# Patient Record
Sex: FEMALE | Race: WHITE | Employment: FULL TIME | URBAN - METROPOLITAN AREA
[De-identification: names, ages, dates, MRNs, and addresses within clinical notes are randomized per-mention and may not be internally consistent; named-entity substitution may affect disease eponyms.]

---

## 2017-11-15 ENCOUNTER — HOSPITAL ENCOUNTER (OUTPATIENT)
Dept: MAMMOGRAPHY | Age: 47
Discharge: HOME | End: 2017-11-15
Attending: OBSTETRICS & GYNECOLOGY
Payer: COMMERCIAL

## 2017-11-15 DIAGNOSIS — Z12.39 BREAST CANCER SCREENING: ICD-10-CM

## 2017-11-15 PROCEDURE — 77067 SCR MAMMO BI INCL CAD: CPT

## 2018-08-24 ENCOUNTER — APPOINTMENT (OUTPATIENT)
Dept: CT IMAGING | Age: 48
DRG: 059 | End: 2018-08-24
Attending: EMERGENCY MEDICINE
Payer: COMMERCIAL

## 2018-08-24 ENCOUNTER — HOSPITAL ENCOUNTER (INPATIENT)
Age: 48
LOS: 3 days | Discharge: ANOTHER HOSPITAL | DRG: 059 | End: 2018-08-27
Attending: EMERGENCY MEDICINE | Admitting: FAMILY MEDICINE
Payer: COMMERCIAL

## 2018-08-24 DIAGNOSIS — G35 MULTIPLE SCLEROSIS EXACERBATION (HCC): Primary | ICD-10-CM

## 2018-08-24 DIAGNOSIS — R56.9 SEIZURE (HCC): ICD-10-CM

## 2018-08-24 LAB
ALBUMIN SERPL-MCNC: 3.8 G/DL (ref 3.5–5)
ALBUMIN/GLOB SERPL: 1 {RATIO} (ref 1.2–3.5)
ALP SERPL-CCNC: 57 U/L (ref 50–136)
ALT SERPL-CCNC: 23 U/L (ref 12–65)
ANION GAP SERPL CALC-SCNC: 10 MMOL/L (ref 7–16)
AST SERPL-CCNC: 29 U/L (ref 15–37)
BASOPHILS # BLD: 0 K/UL (ref 0–0.2)
BASOPHILS NFR BLD: 1 % (ref 0–2)
BILIRUB SERPL-MCNC: 1.4 MG/DL (ref 0.2–1.1)
BUN SERPL-MCNC: 12 MG/DL (ref 6–23)
CALCIUM SERPL-MCNC: 8.6 MG/DL (ref 8.3–10.4)
CHLORIDE SERPL-SCNC: 107 MMOL/L (ref 98–107)
CO2 SERPL-SCNC: 26 MMOL/L (ref 21–32)
CREAT SERPL-MCNC: 0.59 MG/DL (ref 0.6–1)
DIFFERENTIAL METHOD BLD: NORMAL
EOSINOPHIL # BLD: 0.1 K/UL (ref 0–0.8)
EOSINOPHIL NFR BLD: 1 % (ref 0.5–7.8)
ERYTHROCYTE [DISTWIDTH] IN BLOOD BY AUTOMATED COUNT: 12.5 %
GLOBULIN SER CALC-MCNC: 4 G/DL (ref 2.3–3.5)
GLUCOSE SERPL-MCNC: 97 MG/DL (ref 65–100)
HCT VFR BLD AUTO: 41.9 % (ref 35.8–46.3)
HGB BLD-MCNC: 13.8 G/DL (ref 11.7–15.4)
IMM GRANULOCYTES # BLD: 0 K/UL (ref 0–0.5)
IMM GRANULOCYTES NFR BLD AUTO: 0 % (ref 0–5)
LYMPHOCYTES # BLD: 2.1 K/UL (ref 0.5–4.6)
LYMPHOCYTES NFR BLD: 28 % (ref 13–44)
MAGNESIUM SERPL-MCNC: 2 MG/DL (ref 1.8–2.4)
MCH RBC QN AUTO: 29.9 PG (ref 26.1–32.9)
MCHC RBC AUTO-ENTMCNC: 32.9 G/DL (ref 31.4–35)
MCV RBC AUTO: 90.9 FL (ref 79.6–97.8)
MONOCYTES # BLD: 0.5 K/UL (ref 0.1–1.3)
MONOCYTES NFR BLD: 7 % (ref 4–12)
NEUTS SEG # BLD: 4.7 K/UL (ref 1.7–8.2)
NEUTS SEG NFR BLD: 63 % (ref 43–78)
NRBC # BLD: 0 K/UL (ref 0–0.2)
PHOSPHATE SERPL-MCNC: 3.3 MG/DL (ref 2.5–4.5)
PLATELET # BLD AUTO: 343 K/UL (ref 150–450)
PMV BLD AUTO: 9.7 FL (ref 9.4–12.3)
POTASSIUM SERPL-SCNC: 5 MMOL/L (ref 3.5–5.1)
PROT SERPL-MCNC: 7.8 G/DL (ref 6.3–8.2)
RBC # BLD AUTO: 4.61 M/UL (ref 4.05–5.2)
SODIUM SERPL-SCNC: 143 MMOL/L (ref 136–145)
WBC # BLD AUTO: 7.4 K/UL (ref 4.3–11.1)

## 2018-08-24 PROCEDURE — 74011000258 HC RX REV CODE- 258: Performed by: EMERGENCY MEDICINE

## 2018-08-24 PROCEDURE — 74011250637 HC RX REV CODE- 250/637: Performed by: FAMILY MEDICINE

## 2018-08-24 PROCEDURE — 83735 ASSAY OF MAGNESIUM: CPT

## 2018-08-24 PROCEDURE — 80053 COMPREHEN METABOLIC PANEL: CPT

## 2018-08-24 PROCEDURE — 65270000029 HC RM PRIVATE

## 2018-08-24 PROCEDURE — 84100 ASSAY OF PHOSPHORUS: CPT

## 2018-08-24 PROCEDURE — 96365 THER/PROPH/DIAG IV INF INIT: CPT | Performed by: EMERGENCY MEDICINE

## 2018-08-24 PROCEDURE — 74011250636 HC RX REV CODE- 250/636: Performed by: EMERGENCY MEDICINE

## 2018-08-24 PROCEDURE — 70450 CT HEAD/BRAIN W/O DYE: CPT

## 2018-08-24 PROCEDURE — 99284 EMERGENCY DEPT VISIT MOD MDM: CPT | Performed by: EMERGENCY MEDICINE

## 2018-08-24 PROCEDURE — 85025 COMPLETE CBC W/AUTO DIFF WBC: CPT

## 2018-08-24 RX ORDER — ONDANSETRON 2 MG/ML
4 INJECTION INTRAMUSCULAR; INTRAVENOUS
Status: DISCONTINUED | OUTPATIENT
Start: 2018-08-24 | End: 2018-08-27 | Stop reason: HOSPADM

## 2018-08-24 RX ORDER — ACETAMINOPHEN 325 MG/1
650 TABLET ORAL
Status: DISCONTINUED | OUTPATIENT
Start: 2018-08-24 | End: 2018-08-27 | Stop reason: HOSPADM

## 2018-08-24 RX ORDER — HYDROCODONE BITARTRATE AND ACETAMINOPHEN 5; 325 MG/1; MG/1
1 TABLET ORAL
Status: DISCONTINUED | OUTPATIENT
Start: 2018-08-24 | End: 2018-08-27 | Stop reason: HOSPADM

## 2018-08-24 RX ORDER — SODIUM CHLORIDE 0.9 % (FLUSH) 0.9 %
5-10 SYRINGE (ML) INJECTION EVERY 8 HOURS
Status: DISCONTINUED | OUTPATIENT
Start: 2018-08-24 | End: 2018-08-27 | Stop reason: HOSPADM

## 2018-08-24 RX ORDER — SODIUM CHLORIDE 0.9 % (FLUSH) 0.9 %
5-10 SYRINGE (ML) INJECTION AS NEEDED
Status: DISCONTINUED | OUTPATIENT
Start: 2018-08-24 | End: 2018-08-27 | Stop reason: HOSPADM

## 2018-08-24 RX ADMIN — HYDROCODONE BITARTRATE AND ACETAMINOPHEN 1 TABLET: 5; 325 TABLET ORAL at 23:13

## 2018-08-24 RX ADMIN — Medication 5 ML: at 18:17

## 2018-08-24 RX ADMIN — SODIUM CHLORIDE 1000 MG: 9 INJECTION, SOLUTION INTRAVENOUS at 15:49

## 2018-08-24 RX ADMIN — Medication 10 ML: at 21:28

## 2018-08-24 RX ADMIN — ACETAMINOPHEN 650 MG: 325 TABLET, FILM COATED ORAL at 21:28

## 2018-08-24 NOTE — PROGRESS NOTES
TRANSFER - IN REPORT:    Verbal report received from [facility] on [employer]  being received from ED for routine progression of care      Report consisted of patients Situation, Background, Assessment and   Recommendations(SBAR). Information from the following report(s) SBAR, Kardex, ED Summary, [facility] and Recent Results was reviewed with the receiving nurse. Opportunity for questions and clarification was provided. Assessment to be completed upon patients arrival to unit and care assumed at that time.

## 2018-08-24 NOTE — PROGRESS NOTES
Visit with patient in ER prior to transfer to floor. Patient is calm. Assured patient of our support during their time with us.     Vincent Liz,  Staff   C: [phone]  /  [email or web address]

## 2018-08-24 NOTE — H&P
HOSPITALIST H&P/CONSULT  NAME:  Naina Hankins   Age:  41 y.o.  :   1970   MRN:   [id number]  PCP: PROVIDER Simran Park  Consulting MD:  Treatment Team: Attending Provider: Azra Rutherford MD; Primary Nurse: Christine Brooks RN  HPI:     Pt is a 40 yo female with known MS, last flare , no current neurologist due to [employer] retiring. She reports that her right eye vision became blurry about 1.5 weeks ago, she was seen by her eye doc with a dilated eye exam and her opthomalogist felt this was likely due to her optic nerve. A few days later she began having \"pins and needles\" sensation to her right side, starting in her face and now all the way down to her toes. Over the past 48 hours she has developed right sided weakness, now hardly able to lift extremities. She reports some urinary incontinence x 1 month. No bowel incontinence. Pt was given 1 gm Solumedrol in ER. Noted pt is not on any MS meds but has been doing \"diet control\". Vital signs stable. Pt will be admitted for IV steroids, inpatient status. Complete ROS done and is as stated in HPI or otherwise negative  Past Medical History:   Diagnosis Date    Autoimmune disease ([facility] [address] [address]     multiple sclerosis    MS (multiple sclerosis) ([facility] [address] 2012    Other ill-defined conditions(799.89)     ms      Past Surgical History:   Procedure Laterality Date    HX GYN      ruptured ovarian cyst    HX ORTHOPAEDIC      right knee    HX OTHER SURGICAL      laperoscopy    ND COLONOSCOPY FLX DX W/COLLJ SPEC WHEN PFRMD  2010           Prior to Admission Medications   Prescriptions Last Dose Informant Patient Reported? Taking? COPAXONE SC   Yes No   Sig: by SubCUTAneous route. Diphenhydramine-Acetaminophen (PERCOGESIC) 12.5-325 mg tab   Yes No   Sig: Take  by mouth. INTRAUTERINE DEVICE, IUD, IU   Yes No   Sig: by IntraUTERine route.       Facility-Administered Medications: None     Allergies   Allergen Reactions    Meperidine Rash and Nausea and Vomiting    Vioxx [Rofecoxib] Hives      Social History   Substance Use Topics    Smoking status: Never Smoker    Smokeless tobacco: Not on file    Alcohol use No      Family History   Problem Relation Age of Onset    Hypertension Mother     Heart Disease Father     Cancer Paternal Aunt     Cancer Maternal Grandfather     Cancer Paternal Grandmother     Cancer Paternal Grandfather       Objective:     Visit Vitals    /68 (BP 1 Location: Left arm, BP Patient Position: At rest)    Pulse 98    Temp 98.4 °F (36.9 °C)    Resp 20    Ht 5' 4\" (1.626 m)    Wt 62.6 kg (138 lb)    SpO2 96%    BMI 23.69 kg/m2      Temp (24hrs), Av.3 °F (36.8 °C), Min:98.2 °F (36.8 °C), Max:98.4 °F (36.9 °C)    Oxygen Therapy  O2 Sat (%): 96 % (18 1720)  Pulse via Oximetry: 105 beats per minute (18 1720)  O2 Device: Room air (18 1720)    Physical Exam:  General:    Alert, cooperative, no distress, appears stated age. Head:   Normocephalic, without obvious abnormality, atraumatic. Nose:  Nares normal. No drainage or sinus tenderness. Lungs:   Clear to auscultation bilaterally. No Wheezing or Rhonchi. No rales. Heart:   Regular rate and rhythm,  no murmur, rub or gallop. Abdomen:   Soft, non-tender. Not distended. Bowel sounds normal.   Extremities: No cyanosis. No edema. No clubbing  Skin:     Texture, turgor normal. No rashes or lesions.   Not Jaundiced  Neurologic: Alert and oriented x 3, right side dulled sensation and weakness to right extremities, right eye vision blurry     Data Review:   Recent Results (from the past 24 hour(s))   CBC WITH AUTOMATED DIFF    Collection Time: 18  1:44 PM   Result Value Ref Range    WBC 7.4 4.3 - 11.1 K/uL    RBC 4.61 4.05 - 5.2 M/uL    HGB 13.8 11.7 - 15.4 g/dL    HCT 41.9 35.8 - 46.3 %    MCV 90.9 79.6 - 97.8 FL    MCH 29.9 26.1 - 32.9 PG    MCHC 32.9 31.4 - 35.0 g/dL    RDW 12.5 %    PLATELET 576 786 - 848 K/uL    MPV 9.7 9.4 - 12.3 FL    ABSOLUTE NRBC 0.00 0.0 - 0.2 K/uL    DF AUTOMATED      NEUTROPHILS 63 43 - 78 %    LYMPHOCYTES 28 13 - 44 %    MONOCYTES 7 4.0 - 12.0 %    EOSINOPHILS 1 0.5 - 7.8 %    BASOPHILS 1 0.0 - 2.0 %    IMMATURE GRANULOCYTES 0 0.0 - 5.0 %    ABS. NEUTROPHILS 4.7 1.7 - 8.2 K/UL    ABS. LYMPHOCYTES 2.1 0.5 - 4.6 K/UL    ABS. MONOCYTES 0.5 0.1 - 1.3 K/UL    ABS. EOSINOPHILS 0.1 0.0 - 0.8 K/UL    ABS. BASOPHILS 0.0 0.0 - 0.2 K/UL    ABS. IMM. GRANS. 0.0 0.0 - 0.5 K/UL   METABOLIC PANEL, COMPREHENSIVE    Collection Time: 08/24/18  1:46 PM   Result Value Ref Range    Sodium 143 136 - 145 mmol/L    Potassium 5.0 3.5 - 5.1 mmol/L    Chloride 107 98 - 107 mmol/L    CO2 26 21 - 32 mmol/L    Anion gap 10 7 - 16 mmol/L    Glucose 97 65 - 100 mg/dL    BUN 12 6 - 23 MG/DL    Creatinine 0.59 (L) 0.6 - 1.0 MG/DL    GFR est AA >60 >60 ml/min/1.73m2    GFR est non-AA >60 >60 ml/min/1.73m2    Calcium 8.6 8.3 - 10.4 MG/DL    Bilirubin, total 1.4 (H) 0.2 - 1.1 MG/DL    ALT (SGPT) 23 12 - 65 U/L    AST (SGOT) 29 15 - 37 U/L    Alk. phosphatase 57 50 - 136 U/L    Protein, total 7.8 6.3 - 8.2 g/dL    Albumin 3.8 3.5 - 5.0 g/dL    Globulin 4.0 (H) 2.3 - 3.5 g/dL    A-G Ratio 1.0 (L) 1.2 - 3.5     PHOSPHORUS    Collection Time: 08/24/18  1:46 PM   Result Value Ref Range    Phosphorus 3.3 2.5 - 4.5 MG/DL   MAGNESIUM    Collection Time: 08/24/18  1:46 PM   Result Value Ref Range    Magnesium 2.0 1.8 - 2.4 mg/dL     Imaging /Procedures /Studies     CT Head Impression: No acute intracranial abnormality. Assessment and Plan: Active Hospital Problems    Diagnosis Date Noted    Multiple sclerosis exacerbation ([facility] [address] 07/17/2014    MS (multiple sclerosis) ([facility] 12/05/2012       A/P:    MS exacerbation- 1 gram Solumedrol given in ER, cont every 24 hours. Neuro consult. Neuro checks q shift by nursing. Pt needs to be established with outpatient neurologist, her previous has retired. Also not on maintenance meds.   Defer to neuro if MRI necessary to assess for new lesions. DC planning- [facility] in 2-3 days.   No obvious SW needs    DVT Prophylaxis:  SCDs  Code Status: Full  Anticipated discharge: 48-72 hours     Signed By: Duglas Cunningham NP     August 24, 2018

## 2018-08-24 NOTE — ED NOTES
Patient reports itching, L > R side of body. Skin exposed with no hives noted. Patient with clear = LS bilat with no discernable wheeze. MD Dewey advised.

## 2018-08-24 NOTE — ED TRIAGE NOTES
Catie Hearn MD in triage to evaluate. Pt states onset of slurred speech, right leg numbness, difficulty walking, facial numbness. Symptoms started x1-2 weeks ago. Sent by . Difficulty gripping with right hand, blurred vision. Speech problems started this morning. Hx of MS, has not been on medication in 11 years, last flare in 2014 and hospitalized at [address] [place] [address].

## 2018-08-24 NOTE — ED NOTES
Full report from [facility]. Care assumed by this RN at this time. NIH completed by prior nurse. Dysphagia screening performed with no issues. At current, patient speaking clearly and in complete sentences. Vital signs stable. Awaiting hospitalist for admission.

## 2018-08-24 NOTE — ED NOTES
TRANSFER - OUT REPORT:    Verbal report given to Peter (name) on Noah Sink  being transferred to  [phone] for routine progression of care       Report consisted of patients Situation, Background, Assessment and   Recommendations(SBAR). Information from the following report(s) SBAR, Kardex, ED Summary, Procedure Summary, Intake/Output, MAR and Recent Results was reviewed with the receiving nurse. Lines:   Peripheral IV 08/24/18 Left Antecubital (Active)   Site Assessment Clean, dry, & intact 8/24/2018  1:55 PM   Phlebitis Assessment 0 8/24/2018  1:55 PM   Infiltration Assessment 0 8/24/2018  1:55 PM   Dressing Status Clean, dry, & intact 8/24/2018  1:55 PM        Opportunity for questions and clarification was provided.       Patient transported with:   [place]

## 2018-08-24 NOTE — ED PROVIDER NOTES
HPI Comments: 32-year-old lady presented to an [facility] with a right arm and leg weakness and some speech problems. She has a history of multiple sclerosis but has not had any specific treatment for it since 2007. She said her last flareup although it was about 4 years ago. Her neurologist has since retired and so she does not have a neurologist following her anymore. Patient says her symptoms have progressed over the last couple of days. She denies any specific headache but does say that she does feel weak. No nausea or vomiting. Patient says that when she was on the Copaxone it gave her some fatigue and side effects that she could not tolerate. Elements of this note were created using speech recognition software. As such, errors of speech recognition may be present. The history is provided by the patient. Past Medical History:   Diagnosis Date    Autoimmune disease ([facility] [address]     multiple sclerosis    [facility] sclerosis) [facility] 12/5/2012    Other ill-defined conditions     ms       Past Surgical History:   Procedure Laterality Date    COLONOSCOPY,DIAGNOSTIC  6/9/2010         HX GYN      ruptured ovarian cyst    HX ORTHOPAEDIC      right knee    HX OTHER SURGICAL      laperoscopy         Family History:   Problem Relation Age of Onset    Hypertension Mother     Heart Disease Father     Cancer Paternal Aunt     Cancer Maternal Grandfather     Cancer Paternal Grandmother     Cancer Paternal Grandfather        Social History     Social History    Marital status:      Spouse name: N/A    Number of children: N/A    Years of education: N/A     Occupational History    Not on file.      Social History Main Topics    Smoking status: Never Smoker    Smokeless tobacco: Not on file    Alcohol use No    Drug use: No    Sexual activity: Not on file     Other Topics Concern    Not on file     Social History Narrative    No narrative on file         ALLERGIES: Meperidine and Vioxx [rofecoxib]    Review of Systems   Constitutional: Negative for chills, diaphoresis and fever. HENT: Negative for congestion, rhinorrhea and sore throat. Eyes: Negative for redness and visual disturbance. Respiratory: Negative for cough, chest tightness, shortness of breath and wheezing. Cardiovascular: Negative for chest pain and palpitations. Gastrointestinal: Negative for abdominal pain, blood in stool, diarrhea, nausea and vomiting. Endocrine: Negative for polydipsia and polyuria. Genitourinary: Negative for dysuria and hematuria. Musculoskeletal: Negative for arthralgias, myalgias and neck stiffness. Skin: Negative for rash. Allergic/Immunologic: Negative for environmental allergies and food allergies. Neurological: Positive for facial asymmetry, speech difficulty, weakness and numbness. Negative for dizziness and headaches. Hematological: Negative for adenopathy. Does not bruise/bleed easily. Psychiatric/Behavioral: Negative for confusion and sleep disturbance. The patient is not nervous/anxious. There were no vitals filed for this visit. Physical Exam   Constitutional: She is oriented to person, place, and time. She appears well-developed and well-nourished. HENT:   Head: Normocephalic and atraumatic. Eyes: Conjunctivae and EOM are normal. Pupils are equal, round, and reactive to light. Neck: Normal range of motion. Cardiovascular: Normal rate and regular rhythm. Pulmonary/Chest: Effort normal and breath sounds normal. No respiratory distress. She has no wheezes. She has no rales. She exhibits no tenderness. Abdominal: Soft. Bowel sounds are normal. There is no rebound and no guarding. Musculoskeletal: Normal range of motion. She exhibits no edema or tenderness. Lymphadenopathy:     She has no cervical adenopathy. Neurological: She is alert and oriented to person, place, and time. Patient has right arm and right leg weakness.   She also has some right facial droop as well as some speech problems. Skin: Skin is warm and dry. Psychiatric: She has a normal mood and affect. Nursing note and vitals reviewed. MDM  Number of Diagnoses or Management Options  Diagnosis management comments: I discussed the case with Dr. Deysi Cano who advised admission for steroids pending the results of her CT scan.    3:01 PM  I discussed the case with the hospitalist who kindly agreed to see the patient.         ED Course       Procedures

## 2018-08-25 ENCOUNTER — APPOINTMENT (OUTPATIENT)
Dept: MRI IMAGING | Age: 48
DRG: 059 | End: 2018-08-25
Attending: NURSE PRACTITIONER
Payer: COMMERCIAL

## 2018-08-25 LAB
ANION GAP SERPL CALC-SCNC: 10 MMOL/L (ref 7–16)
APPEARANCE UR: CLEAR
BACTERIA URNS QL MICRO: ABNORMAL /HPF
BILIRUB UR QL: NEGATIVE
BUN SERPL-MCNC: 11 MG/DL (ref 6–23)
CALCIUM SERPL-MCNC: 8.7 MG/DL (ref 8.3–10.4)
CASTS URNS QL MICRO: ABNORMAL /LPF
CHLORIDE SERPL-SCNC: 107 MMOL/L (ref 98–107)
CO2 SERPL-SCNC: 23 MMOL/L (ref 21–32)
COLOR UR: YELLOW
CREAT SERPL-MCNC: 0.56 MG/DL (ref 0.6–1)
EPI CELLS #/AREA URNS HPF: ABNORMAL /HPF
ERYTHROCYTE [DISTWIDTH] IN BLOOD BY AUTOMATED COUNT: 12.2 %
ERYTHROCYTE [SEDIMENTATION RATE] IN BLOOD: 8 MM/HR (ref 0–20)
GLUCOSE BLD STRIP.AUTO-MCNC: 148 MG/DL (ref 65–100)
GLUCOSE BLD STRIP.AUTO-MCNC: 272 MG/DL (ref 65–100)
GLUCOSE BLD STRIP.AUTO-MCNC: 95 MG/DL (ref 65–100)
GLUCOSE SERPL-MCNC: 214 MG/DL (ref 65–100)
GLUCOSE UR STRIP.AUTO-MCNC: NEGATIVE MG/DL
HCT VFR BLD AUTO: 39.1 % (ref 35.8–46.3)
HGB BLD-MCNC: 12.9 G/DL (ref 11.7–15.4)
HGB UR QL STRIP: ABNORMAL
KETONES UR QL STRIP.AUTO: NEGATIVE MG/DL
LEUKOCYTE ESTERASE UR QL STRIP.AUTO: ABNORMAL
MCH RBC QN AUTO: 29.8 PG (ref 26.1–32.9)
MCHC RBC AUTO-ENTMCNC: 33 G/DL (ref 31.4–35)
MCV RBC AUTO: 90.3 FL (ref 79.6–97.8)
NITRITE UR QL STRIP.AUTO: NEGATIVE
NRBC # BLD: 0 K/UL (ref 0–0.2)
PH UR STRIP: 6 [PH] (ref 5–9)
PLATELET # BLD AUTO: 326 K/UL (ref 150–450)
PMV BLD AUTO: 9.9 FL (ref 9.4–12.3)
POTASSIUM SERPL-SCNC: 3.8 MMOL/L (ref 3.5–5.1)
PROT UR STRIP-MCNC: ABNORMAL MG/DL
RBC # BLD AUTO: 4.33 M/UL (ref 4.05–5.2)
RBC #/AREA URNS HPF: ABNORMAL /HPF
SODIUM SERPL-SCNC: 140 MMOL/L (ref 136–145)
SP GR UR REFRACTOMETRY: 1.03 (ref 1–1.02)
TSH SERPL DL<=0.005 MIU/L-ACNC: 0.34 UIU/ML (ref 0.36–3.74)
UROBILINOGEN UR QL STRIP.AUTO: 0.2 EU/DL (ref 0.2–1)
WBC # BLD AUTO: 9.7 K/UL (ref 4.3–11.1)
WBC URNS QL MICRO: ABNORMAL /HPF

## 2018-08-25 PROCEDURE — 72156 MRI NECK SPINE W/O & W/DYE: CPT

## 2018-08-25 PROCEDURE — 87086 URINE CULTURE/COLONY COUNT: CPT

## 2018-08-25 PROCEDURE — 86038 ANTINUCLEAR ANTIBODIES: CPT

## 2018-08-25 PROCEDURE — 74011250636 HC RX REV CODE- 250/636: Performed by: FAMILY MEDICINE

## 2018-08-25 PROCEDURE — 72157 MRI CHEST SPINE W/O & W/DYE: CPT

## 2018-08-25 PROCEDURE — 65270000029 HC RM PRIVATE

## 2018-08-25 PROCEDURE — 85027 COMPLETE CBC AUTOMATED: CPT

## 2018-08-25 PROCEDURE — 74011250637 HC RX REV CODE- 250/637: Performed by: FAMILY MEDICINE

## 2018-08-25 PROCEDURE — 74011636637 HC RX REV CODE- 636/637: Performed by: NURSE PRACTITIONER

## 2018-08-25 PROCEDURE — 82962 GLUCOSE BLOOD TEST: CPT

## 2018-08-25 PROCEDURE — 74011250636 HC RX REV CODE- 250/636: Performed by: NURSE PRACTITIONER

## 2018-08-25 PROCEDURE — 81001 URINALYSIS AUTO W/SCOPE: CPT

## 2018-08-25 PROCEDURE — 84443 ASSAY THYROID STIM HORMONE: CPT

## 2018-08-25 PROCEDURE — 97161 PT EVAL LOW COMPLEX 20 MIN: CPT

## 2018-08-25 PROCEDURE — 36415 COLL VENOUS BLD VENIPUNCTURE: CPT

## 2018-08-25 PROCEDURE — 93005 ELECTROCARDIOGRAM TRACING: CPT | Performed by: FAMILY MEDICINE

## 2018-08-25 PROCEDURE — 74011000258 HC RX REV CODE- 258: Performed by: FAMILY MEDICINE

## 2018-08-25 PROCEDURE — A9575 INJ GADOTERATE MEGLUMI 0.1ML: HCPCS | Performed by: FAMILY MEDICINE

## 2018-08-25 PROCEDURE — 97530 THERAPEUTIC ACTIVITIES: CPT

## 2018-08-25 PROCEDURE — 80048 BASIC METABOLIC PNL TOTAL CA: CPT

## 2018-08-25 PROCEDURE — 70553 MRI BRAIN STEM W/O & W/DYE: CPT

## 2018-08-25 PROCEDURE — 77030020263 HC SOL INJ SOD CL0.9% LFCR 1000ML

## 2018-08-25 PROCEDURE — 83520 IMMUNOASSAY QUANT NOS NONAB: CPT

## 2018-08-25 PROCEDURE — 85652 RBC SED RATE AUTOMATED: CPT

## 2018-08-25 RX ORDER — INSULIN LISPRO 100 [IU]/ML
0-10 INJECTION, SOLUTION INTRAVENOUS; SUBCUTANEOUS
Status: DISCONTINUED | OUTPATIENT
Start: 2018-08-25 | End: 2018-08-27 | Stop reason: HOSPADM

## 2018-08-25 RX ORDER — SODIUM CHLORIDE 0.9 % (FLUSH) 0.9 %
10 SYRINGE (ML) INJECTION
Status: COMPLETED | OUTPATIENT
Start: 2018-08-25 | End: 2018-08-25

## 2018-08-25 RX ORDER — LORAZEPAM 2 MG/ML
1 INJECTION INTRAMUSCULAR
Status: DISCONTINUED | OUTPATIENT
Start: 2018-08-25 | End: 2018-08-26

## 2018-08-25 RX ORDER — SODIUM CHLORIDE 9 MG/ML
2000 INJECTION, SOLUTION INTRAVENOUS CONTINUOUS
Status: DISCONTINUED | OUTPATIENT
Start: 2018-08-25 | End: 2018-08-27

## 2018-08-25 RX ORDER — SUMATRIPTAN 50 MG/1
50 TABLET, FILM COATED ORAL
Status: DISCONTINUED | OUTPATIENT
Start: 2018-08-25 | End: 2018-08-27 | Stop reason: HOSPADM

## 2018-08-25 RX ORDER — GADOTERATE MEGLUMINE 376.9 MG/ML
12 INJECTION INTRAVENOUS
Status: COMPLETED | OUTPATIENT
Start: 2018-08-25 | End: 2018-08-25

## 2018-08-25 RX ADMIN — Medication 10 ML: at 15:15

## 2018-08-25 RX ADMIN — Medication 10 ML: at 16:53

## 2018-08-25 RX ADMIN — INSULIN LISPRO 6 UNITS: 100 INJECTION, SOLUTION INTRAVENOUS; SUBCUTANEOUS at 21:45

## 2018-08-25 RX ADMIN — GADOTERATE MEGLUMINE 12 ML: 376.9 INJECTION INTRAVENOUS at 16:53

## 2018-08-25 RX ADMIN — HYDROCODONE BITARTRATE AND ACETAMINOPHEN 1 TABLET: 5; 325 TABLET ORAL at 09:40

## 2018-08-25 RX ADMIN — Medication 10 ML: at 06:26

## 2018-08-25 RX ADMIN — LORAZEPAM 1 MG: 2 INJECTION INTRAMUSCULAR; INTRAVENOUS at 18:04

## 2018-08-25 RX ADMIN — LORAZEPAM 1 MG: 2 INJECTION INTRAMUSCULAR; INTRAVENOUS at 15:14

## 2018-08-25 RX ADMIN — SODIUM CHLORIDE 2000 ML: 900 INJECTION, SOLUTION INTRAVENOUS at 23:15

## 2018-08-25 RX ADMIN — SODIUM CHLORIDE 1000 MG: 9 INJECTION, SOLUTION INTRAVENOUS at 17:48

## 2018-08-25 RX ADMIN — LORAZEPAM 1 MG: 2 INJECTION INTRAMUSCULAR; INTRAVENOUS at 10:57

## 2018-08-25 RX ADMIN — Medication 5 ML: at 21:46

## 2018-08-25 RX ADMIN — SUMATRIPTAN SUCCINATE 50 MG: 50 TABLET ORAL at 06:25

## 2018-08-25 NOTE — REHAB NOTE
Attempted to see patient - family at bedside. Patient had just fallen asleep. Family asked me to return later.     Fidelina Estes, SLP

## 2018-08-25 NOTE — PROGRESS NOTES
Problem: Mobility Impaired (Adult and Pediatric)  Goal: *Acute Goals and Plan of Care (Insert Text)  LTG:  (1.)Ms. Simon Hernandez will move from supine to sit and sit to supine  in bed with MODIFIED INDEPENDENCE within 7 treatment day(s). (2.)Ms. Simon Hernandez will transfer from bed to chair and chair to bed with STAND BY ASSIST using the least restrictive device within 7 treatment day(s). (3.)Ms. Simon Hernandez will ambulate with CONTACT GUARD ASSIST for 50 feet with the least restrictive device within 7 treatment day(s). ________________________________________________________________________________________________       PHYSICAL THERAPY: Initial Assessment, Treatment Day: Day of Assessment, AM 8/25/2018  INPATIENT: Hospital Day: 2  Payor: Emelyn Austin / Plan: [facility] / Product Type: PPO /      NAME/AGE/GENDER: Tanna Garcia is a 41 y.o. female   PRIMARY DIAGNOSIS: MS (multiple sclerosis) ([address] Multiple sclerosis exacerbation ([facility] Multiple sclerosis exacerbation ([facility]        ICD-10: Treatment Diagnosis:    · Generalized Muscle Weakness (M62.81)  · Difficulty in walking, Not elsewhere classified (R26.2)  · Other abnormalities of gait and mobility (R26.89)   Precaution/Allergies:  Meperidine and Vioxx [rofecoxib]      ASSESSMENT:     Ms. Simon Hernandez is a pleasant 41 y.o female admitted to inpatient stay with an acute exacerbation of MS. Pt presented to ER with R sided UE and LE numbness/tingling, blurry vision in the R eye, and R sided UE and LE weakness over the past 48 hours. Pt is supine in bed with  present and agreeable to initial PT evaluation this AM. Pt reports she lives one the first floor of a two story home with her  and children, she is independent with ADLs at baseline, still drives, and works as a . Pt reports 6/10 headache pain this AM. Pt performed supine to sitting at EOB transfer with Lexii for advancement of trunk and L LE.  Gross B UE and LE MMT assessed: L UE and LE measuring 5/5 throughout while the R UE and LE measures grossly 3/5. Pt reports decreased sensation to B R UE and LE to light touch when compared to the L side. Pt performed sit-stand transfer with RW and Lexii. Pt stood for 1.5 minutes and then required a seated rest break secondary to light headedness. Pt reports her symptoms resolved and was able to ambulate x 5 ft with RW and modA before requiring another rest break. Pt demonstrates a step-to gait pattern with increased trunk lean to the L side. Pt is able to weight bear through the R LE, but reports decreased sensation which makes it difficult to walk. Pt rested x 2 minutes and then ambulated x 5 ft back to the EOB with modA. Pt sat at EOB x 8 minutes while speaking with telehealth MD prior to returning to supine position with Lexii for advancement of L LE. Should pt's R sided weakness and decreased sensation not resolve prior to discharge, pt will benefit from HHPT or OP PT in order to progress her independent mobility. Pt will benefit from skilled PT through inpatient stay (medically necessary) to address decreased strength, decreased balance, and decreased functional tolerance affecting her participation in basic ADLs and functional tasks. Will continue to follow pt. This section established at most recent assessment   PROBLEM LIST (Impairments causing functional limitations):  1. Decreased Strength  2. Decreased ADL/Functional Activities  3. Decreased Transfer Abilities  4. Decreased Ambulation Ability/Technique  5. Decreased Balance  6. Decreased Activity Tolerance   INTERVENTIONS PLANNED: (Benefits and precautions of physical therapy have been discussed with the patient.)  1. Balance Exercise  2. Bed Mobility  3. Family Education  4. Gait Training  5. Neuromuscular Re-education/Strengthening  6. Range of Motion (ROM)  7. Therapeutic Activites  8. Therapeutic Exercise/Strengthening  9. Transfer Training  10.  Group Therapy     TREATMENT PLAN: Frequency/Duration: 3 times a week for duration of hospital stay  Rehabilitation Potential For Stated Goals: Good     RECOMMENDED REHABILITATION/EQUIPMENT: (at time of discharge pending progress): Due to the probability of continued deficits (see above) this patient will likely need continued skilled physical therapy after discharge. Equipment:    None at this time              HISTORY:   History of Present Injury/Illness (Reason for Referral):  Per H&P: \"Pt is a 42 yo female with known MS, last flare 2014, no current neurologist due to [employer] retiring. She reports that her right eye vision became blurry about 1.5 weeks ago, she was seen by her eye doc with a dilated eye exam and her opthomalogist felt this was likely due to her optic nerve. A few days later she began having \"pins and needles\" sensation to her right side, starting in her face and now all the way down to her toes. Over the past 48 hours she has developed right sided weakness, now hardly able to lift extremities. She reports some urinary incontinence x 1 month. No bowel incontinence. Pt was given 1 gm Solumedrol in ER. Noted pt is not on any MS meds but has been doing \"diet control\". Vital signs stable. Pt will be admitted for IV steroids, inpatient status. \"  Past Medical History/Comorbidities:   Ms. Dilma Byers  has a past medical history of Autoimmune disease ([address] MS (multiple sclerosis) ([address] (12/5/2012); and Other ill-defined conditions(799.89). She also has no past medical history of Arrhythmia; Arthritis; Asthma; CAD (coronary artery disease); Cancer ([address] Chronic kidney disease; Chronic pain; Coagulation defects; COPD; Diabetes ([address] GERD (gastroesophageal reflux disease); Heart failure ([address] Hypertension; Liver disease; Psychiatric disorder; PUD (peptic ulcer disease); Seizures ([address] Stroke [facility]); Thromboembolus ([address] or Thyroid disease.   Ms. Dilma Byers  has a past surgical history that includes hx orthopaedic; hx other surgical; hx gyn; and pr colonoscopy flx dx w/collj spec when pfrmd (6/9/2010). Social History/Living Environment:   Home Environment: Private residence  # Steps to Enter: 0  One/Two Story Residence: Two story, live on 1st floor  Living Alone: No  Support Systems: Spouse/Significant Other/Partner, Child(roberto)  Patient Expects to be Discharged to[de-identified] Private residence  Current DME Used/Available at Home: None  Prior Level of Function/Work/Activity:  Independent  Dominant Side:         RIGHT   Number of Personal Factors/Comorbidities that affect the Plan of Care: 0: LOW COMPLEXITY   EXAMINATION:   Most Recent Physical Functioning:   Gross Assessment:  AROM: Generally decreased, functional (R UE and LE)  Strength: Grossly decreased, non-functional (R UE and LE)  Coordination: Generally decreased, functional  Tone: Normal  Sensation: Impaired (R UE and LE)               Posture:  Posture (WDL): Within defined limits  Balance:  Sitting: Intact  Standing: Impaired  Standing - Static: Good;Constant support  Standing - Dynamic : Fair Bed Mobility:  Rolling: Minimum assistance  Supine to Sit: Minimum assistance  Sit to Supine: Minimum assistance  Wheelchair Mobility:     Transfers:  Sit to Stand: Minimum assistance  Stand to Sit: Minimum assistance  Gait:     Base of Support: Narrowed; Shift to left  Speed/Kathy: Slow  Step Length: Left shortened;Right shortened  Stance: Left increased;Right decreased  Gait Abnormalities: Trunk sway increased; Step to gait;Scissoring;Decreased step clearance  Distance (ft): 10 Feet (ft)  Assistive Device: Walker, rolling;Gait belt  Ambulation - Level of Assistance: Moderate assistance      Body Structures Involved:  1. Nerves  2. Eyes and Ears  3. Voice/Speech  4. Muscles Body Functions Affected:  1. Sensory/Pain  2. Neuromusculoskeletal  3. Movement Related Activities and Participation Affected:  1. General Tasks and Demands  2. Mobility  3. Domestic Life  4.  Community, Social and [place]   Number of elements that affect the Plan of Care: 4+: HIGH COMPLEXITY   CLINICAL PRESENTATION:   Presentation: Evolving clinical presentation with changing clinical characteristics: MODERATE COMPLEXITY   CLINICAL DECISION MAKIN [facility] Mobility Inpatient Short Form  How much difficulty does the patient currently have. .. Unable A Lot A Little None   1. Turning over in bed (including adjusting bedclothes, sheets and blankets)? [] 1   [] 2   [] 3   [x] 4   2. Sitting down on and standing up from a chair with arms ( e.g., wheelchair, bedside commode, etc.)   [] 1   [] 2   [x] 3   [] 4   3. Moving from lying on back to sitting on the side of the bed? [] 1   [] 2   [x] 3   [] 4   How much help from another person does the patient currently need. .. Total A Lot A Little None   4. Moving to and from a bed to a chair (including a wheelchair)? [] 1   [] 2   [x] 3   [] 4   5. Need to walk in hospital room? [] 1   [x] 2   [] 3   [] 4   6. Climbing 3-5 steps with a railing? [] 1   [x] 2   [] 3   [] 4   © , Trustees of [facility] [employer], under license to [employer]. All rights reserved      Score:  Initial: 17 Most Recent: X (Date: -- )    Interpretation of Tool:  Represents activities that are increasingly more difficult (i.e. Bed mobility, Transfers, Gait). Score 24 23 22-20 19-15 14-10 9-7 6     Modifier CH CI CJ CK CL CM CN      ?  Mobility - Walking and Moving Around:     - CURRENT STATUS: CK - 40%-59% impaired, limited or restricted    - GOAL STATUS: CJ - 20%-39% impaired, limited or restricted    - D/C STATUS:  ---------------To be determined---------------  Payor: BLUE CROSS / Plan: [place] [employer] [address] / Product Type: PPO /      Medical Necessity:     · Patient is expected to demonstrate progress in strength, range of motion, balance, coordination and functional technique to increase independence with functional mobility and participation in ADLs.  Reason for Services/Other Comments:  · Patient continues to require modification of therapeutic interventions to increase complexity of exercises. Use of outcome tool(s) and clinical judgement create a POC that gives a: Questionable prediction of patient's progress: MODERATE COMPLEXITY            TREATMENT:   (In addition to Assessment/Re-Assessment sessions the following treatments were rendered)   Pre-treatment Symptoms/Complaints: \"My whole right side is weak, numb, and tingling. \"  Pain: Initial:   Pain Intensity 1: 6  Pain Location 1: Head  Post Session: 6/10 headache     Therapeutic Activity: (    10 minutes): Therapeutic activities including Bed transfers, Chair transfers and Ambulation on level ground to improve mobility, strength, balance and coordination. Required moderate   to promote static and dynamic balance in standing. Braces/Orthotics/Lines/Etc:   · O2 Device: Room air  Treatment/Session Assessment:    · Response to Treatment: Patient tolerated treatment well, with reports of frustration towards her symptoms and situation. Required one rest break during ambulation, where pt ambulated x 10ft to door and back to bed. · Interdisciplinary Collaboration:   o Physical Therapist  o Registered Nurse  o Physician  · After treatment position/precautions:   o Supine in bed  o Bed/Chair-wheels locked  o Bed in low position  o Call light within reach  o RN notified  o MD at bedside   · Compliance with Program/Exercises: Will assess as treatment progresses. · Recommendations/Intent for next treatment session: \"Next visit will focus on advancements to more challenging activities and reduction in assistance provided\".   Total Treatment Duration:  PT Patient Time In/Time Out  Time In: 0831  Time Out: 8924    Arcadio Hardy DPT

## 2018-08-25 NOTE — PROGRESS NOTES
Problem: Falls - Risk of  Goal: *Absence of Falls  Document Flakito Fall Risk and appropriate interventions in the flowsheet.    Outcome: Progressing Towards Goal  Fall Risk Interventions:  Mobility Interventions: OT consult for ADLs, Patient to call before getting OOB, PT Consult for mobility concerns, PT Consult for assist device competence         Medication Interventions: Patient to call before getting OOB, Teach patient to arise slowly    Elimination Interventions: Call light in reach

## 2018-08-25 NOTE — PROGRESS NOTES
Problem: Falls - Risk of  Goal: *Absence of Falls  Document Flakito Fall Risk and appropriate interventions in the flowsheet.    Outcome: Progressing Towards Goal  Fall Risk Interventions:  Mobility Interventions: Communicate number of staff needed for ambulation/transfer, OT consult for ADLs, Patient to call before getting OOB, PT Consult for mobility concerns, PT Consult for assist device competence, Utilize walker, cane, or other assistive device         Medication Interventions: Evaluate medications/consider consulting pharmacy, Patient to call before getting OOB, Teach patient to arise slowly    Elimination Interventions: Call light in reach, Patient to call for help with toileting needs, Toileting schedule/hourly rounds

## 2018-08-25 NOTE — PROGRESS NOTES
Occupational Therapy Note: orders received, chart reviewed, but pt not found in her room. RN located and pt is off the floor for 3 separate MRIs. Will attempt at a later time.  Thank you for this referral.  Latrice Olmos MS, OTR/L- [id number]

## 2018-08-25 NOTE — PROGRESS NOTES
Hospitalist Progress Note     Admit Date:  2018  1:37 PM   Name:  Joi Raya   Age:  41 y.o.  :  1970   MRN:  [id number]   PCP:  PROVIDER UNKNOWN  Treatment Team: Attending Provider: Lui Pineda MD; Consulting Provider: Lui Pineda MD; Utilization Review: Alejo Sotelo    Subjective:   CC: Blurred vision with pins and needles right side    Pt is a 40 yo female with PMH of MS with last flare . Pt presented with history of her right eye vision becoming blurry over the past week or so. She saw the ophthalmologist who dilated her eyes and felt that it was likely her optic nerve. A few days later she developed pins and needles on her right side from her face to her toes. 48 hrs prior to admit she had right sided weakness develop to the point where she was not able to even lift her arm or leg. Pt has had urinary incontinence over the past month but no loss of bowel control. In the ED pt received 1 gm of solumedrol. Pt has not been on any MS medications since she has been trying to manage the MS with diet. Pt had a tele-neuro consult today with Dr. Jermaine Rosario who has a number of recommendations for testing. Pt notes that she has claustrophobia and is very anxious about MRI and also has not been able to sleep 2/2 to steroid therapy. Pt's blood sugar was elevated at 214 this am, also 2/2 steroids.       Objective:   Patient Vitals for the past 24 hrs:   Temp Pulse Resp BP SpO2   18 0711 98.3 °F (36.8 °C) 83 18 116/78 96 %   18 0423 98 °F (36.7 °C) 93 18 112/72 96 %   18 2313 98.8 °F (37.1 °C) (!) 103 20 126/74 96 %   18 98.7 °F (37.1 °C) (!) 104 20 135/84 94 %   18 1812 97.9 °F (36.6 °C) 93 18 (!) 128/91 93 %   18 1720 98.4 °F (36.9 °C) 98 20 124/68 96 %   18 1549 - 95 - 128/73 100 %   18 1524 - 87 - - 100 %   18 1336 98.2 °F (36.8 °C) (!) 102 18 142/88 98 %     Oxygen Therapy  O2 Sat (%): 96 % (18 0711)  Pulse via Oximetry: 105 beats per minute (08/24/18 1720)  O2 Device: Room air (08/24/18 1720)  No intake or output data in the 24 hours ending 08/25/18 1005      General:    Pale. Awake and alert, appears stated age  Head:  Normocephalic, atraumatic  Eyes:  Extraocular movements intact, normal sclera, right eye vision blurry  CV:   RRR. No  Murmurs, clicks, or gallops  Lungs:   Unlabored, no cyanosis  Abdomen:   Soft, nondistended, nontender. Extremities: Warm and dry. No cyanosis or edema. Right sided weakness, pins and needles sensation   Skin:     No rashes or jaundice. Neuro:  No gross focal deficits  Psych:  Mood and affect appropriate, A&O x 3    Data Review:  I have reviewed all labs, meds, telemetry events, and studies from the last 24 hours. Recent Results (from the past 24 hour(s))   CBC WITH AUTOMATED DIFF    Collection Time: 08/24/18  1:44 PM   Result Value Ref Range    WBC 7.4 4.3 - 11.1 K/uL    RBC 4.61 4.05 - 5.2 M/uL    HGB 13.8 11.7 - 15.4 g/dL    HCT 41.9 35.8 - 46.3 %    MCV 90.9 79.6 - 97.8 FL    MCH 29.9 26.1 - 32.9 PG    MCHC 32.9 31.4 - 35.0 g/dL    RDW 12.5 %    PLATELET 011 761 - 095 K/uL    MPV 9.7 9.4 - 12.3 FL    ABSOLUTE NRBC 0.00 0.0 - 0.2 K/uL    DF AUTOMATED      NEUTROPHILS 63 43 - 78 %    LYMPHOCYTES 28 13 - 44 %    MONOCYTES 7 4.0 - 12.0 %    EOSINOPHILS 1 0.5 - 7.8 %    BASOPHILS 1 0.0 - 2.0 %    IMMATURE GRANULOCYTES 0 0.0 - 5.0 %    ABS. NEUTROPHILS 4.7 1.7 - 8.2 K/UL    ABS. LYMPHOCYTES 2.1 0.5 - 4.6 K/UL    ABS. MONOCYTES 0.5 0.1 - 1.3 K/UL    ABS. EOSINOPHILS 0.1 0.0 - 0.8 K/UL    ABS. BASOPHILS 0.0 0.0 - 0.2 K/UL    ABS. IMM.  GRANS. 0.0 0.0 - 0.5 K/UL   METABOLIC PANEL, COMPREHENSIVE    Collection Time: 08/24/18  1:46 PM   Result Value Ref Range    Sodium 143 136 - 145 mmol/L    Potassium 5.0 3.5 - 5.1 mmol/L    Chloride 107 98 - 107 mmol/L    CO2 26 21 - 32 mmol/L    Anion gap 10 7 - 16 mmol/L    Glucose 97 65 - 100 mg/dL    BUN 12 6 - 23 MG/DL    Creatinine 0.59 (L) 0.6 - 1.0 MG/DL    GFR est AA >60 >60 ml/min/1.73m2    GFR est non-AA >60 >60 ml/min/1.73m2    Calcium 8.6 8.3 - 10.4 MG/DL    Bilirubin, total 1.4 (H) 0.2 - 1.1 MG/DL    ALT (SGPT) 23 12 - 65 U/L    AST (SGOT) 29 15 - 37 U/L    Alk.  phosphatase 57 50 - 136 U/L    Protein, total 7.8 6.3 - 8.2 g/dL    Albumin 3.8 3.5 - 5.0 g/dL    Globulin 4.0 (H) 2.3 - 3.5 g/dL    A-G Ratio 1.0 (L) 1.2 - 3.5     PHOSPHORUS    Collection Time: 08/24/18  1:46 PM   Result Value Ref Range    Phosphorus 3.3 2.5 - 4.5 MG/DL   MAGNESIUM    Collection Time: 08/24/18  1:46 PM   Result Value Ref Range    Magnesium 2.0 1.8 - 2.4 mg/dL   METABOLIC PANEL, BASIC    Collection Time: 08/25/18  5:04 AM   Result Value Ref Range    Sodium 140 136 - 145 mmol/L    Potassium 3.8 3.5 - 5.1 mmol/L    Chloride 107 98 - 107 mmol/L    CO2 23 21 - 32 mmol/L    Anion gap 10 7 - 16 mmol/L    Glucose 214 (H) 65 - 100 mg/dL    BUN 11 6 - 23 MG/DL    Creatinine 0.56 (L) 0.6 - 1.0 MG/DL    GFR est AA >60 >60 ml/min/1.73m2    GFR est non-AA >60 >60 ml/min/1.73m2    Calcium 8.7 8.3 - 10.4 MG/DL   CBC W/O DIFF    Collection Time: 08/25/18  5:04 AM   Result Value Ref Range    WBC 9.7 4.3 - 11.1 K/uL    RBC 4.33 4.05 - 5.2 M/uL    HGB 12.9 11.7 - 15.4 g/dL    HCT 39.1 35.8 - 46.3 %    MCV 90.3 79.6 - 97.8 FL    MCH 29.8 26.1 - 32.9 PG    MCHC 33.0 31.4 - 35.0 g/dL    RDW 12.2 %    PLATELET 894 181 - 548 K/uL    MPV 9.9 9.4 - 12.3 FL    ABSOLUTE NRBC 0.00 0.0 - 0.2 K/uL        All Micro Results     None          Current Meds:  Current Facility-Administered Medications   Medication Dose Route Frequency    SUMAtriptan (IMITREX) tablet 50 mg  50 mg Oral BID PRN    insulin lispro (HUMALOG) injection   SubCUTAneous AC&HS    LORazepam (ATIVAN) injection 1 mg  1 mg IntraVENous Q4H PRN    sodium chloride (NS) flush 5-10 mL  5-10 mL IntraVENous Q8H    sodium chloride (NS) flush 5-10 mL  5-10 mL IntraVENous PRN    acetaminophen (TYLENOL) tablet 650 mg  650 mg Oral Q4H PRN    HYDROcodone-acetaminophen (NORCO) 5-325 mg per tablet 1 Tab  1 Tab Oral Q4H PRN    ondansetron (ZOFRAN) injection 4 mg  4 mg IntraVENous Q4H PRN    methylPREDNISolone (PF) (SOLU-MEDROL) 1,000 mg in 0.9% sodium chloride 100 mL IVPB  1,000 mg IntraVENous Q24H       Diet:  DIET CARDIAC    Other Studies (last 24 hours):  Ct Head Wo Cont    Result Date: 8/24/2018  Noncontrast head CT Clinical Indication: Slurred speech, right leg numbness, facial numbness, difficulty walking for one-2 weeks. Right M new weakness. History of multiple sclerosis. Technique: Noncontrast axial images were obtained through the brain. All CT scans at this location are performed using dose modulation techniques as appropriate including the following: Automated exposure control, adjustment of the MA and/or kV according to patient's size, or use of iterative reconstruction technique. Comparison: Brain MRI 7/17/2014 Findings: There is no acute intracranial hemorrhage, hydrocephalus, intra-axial mass, or mass-effect. There is no CT evidence of acute large artery territorial infarction or abnormal extra-axial fluid collection. The mastoid air cells and paranasal sinuses are clear where imaged. No displaced skull fractures are present. Impression: No acute intracranial abnormality. Assessment and Plan:     Hospital Problems as of 8/25/2018  Never Reviewed          Codes Class Noted - Resolved POA    * (Principal)Multiple sclerosis exacerbation ([facility] ICD-10-CM: G35  ICD-9-CM: 340  7/17/2014 - Present Yes        MS (multiple sclerosis) ([facility] ICD-10-CM: G35  ICD-9-CM: 708  12/5/2012 - Present Unknown              A/P:    1.MS exacerbation  -Neuro consult  -neuro checks q shift  -IV solumedrol - ordered for 3 days, if evidence of progression on MRI extend to 5 days followed by prednisone taper x 2 wks  -MRIs head, c and t spine w/wo contrast  -TSH, sed rate, NMO antibodies, MASHA  -PT/OT  -SLP for speech and swallow    2. Urinary incontinence  -Check UA    3. Hyperglycemia  -Monitor glucose - POC checks  -SSI    DC planning/Dispo:  Home, may need outpt infusion, therapy  DVT ppx:  SCDs    Code status:  Full  Medical decision maker: Spouse Lamberto    Case reviewed with supervising physician - COCO San MD    Signed:  JAKUB SanzC

## 2018-08-26 ENCOUNTER — APPOINTMENT (OUTPATIENT)
Dept: MRI IMAGING | Age: 48
DRG: 059 | End: 2018-08-26
Attending: FAMILY MEDICINE
Payer: COMMERCIAL

## 2018-08-26 LAB
ANION GAP SERPL CALC-SCNC: 12 MMOL/L (ref 7–16)
ARTERIAL PATENCY WRIST A: POSITIVE
ATRIAL RATE: 135 BPM
BASE EXCESS BLDA CALC-SCNC: 0.2 MMOL/L (ref 0–3)
BDY SITE: ABNORMAL
BUN SERPL-MCNC: 10 MG/DL (ref 6–23)
CA-I BLD-SCNC: 1.13 MMOL/L (ref 1–1.3)
CALCIUM SERPL-MCNC: 7.9 MG/DL (ref 8.3–10.4)
CALCULATED P AXIS, ECG09: 59 DEGREES
CALCULATED R AXIS, ECG10: 54 DEGREES
CALCULATED T AXIS, ECG11: 66 DEGREES
CHLORIDE BLDA-SCNC: 107 MMOL/L (ref 98–106)
CHLORIDE SERPL-SCNC: 109 MMOL/L (ref 98–107)
CO2 SERPL-SCNC: 24 MMOL/L (ref 21–32)
COHGB MFR BLD: 0 % (ref 0.5–1.5)
CREAT SERPL-MCNC: 0.64 MG/DL (ref 0.6–1)
DIAGNOSIS, 93000: NORMAL
DO-HGB BLD-MCNC: 2 % (ref 0–5)
GAS FLOW.O2 O2 DELIVERY SYS: 2 L/MIN
GLUCOSE BLD STRIP.AUTO-MCNC: 106 MG/DL (ref 65–100)
GLUCOSE BLD STRIP.AUTO-MCNC: 126 MG/DL (ref 65–100)
GLUCOSE BLD STRIP.AUTO-MCNC: 129 MG/DL (ref 65–100)
GLUCOSE BLD STRIP.AUTO-MCNC: 136 MG/DL (ref 65–100)
GLUCOSE BLD STRIP.AUTO-MCNC: 168 MG/DL (ref 65–100)
GLUCOSE SERPL-MCNC: 99 MG/DL (ref 65–100)
HCO3 BLDA-SCNC: 24 MMOL/L (ref 22–26)
HGB BLDMV-MCNC: 12.7 GM/DL (ref 11.7–15)
MAGNESIUM SERPL-MCNC: 1.8 MG/DL (ref 1.8–2.4)
METHGB MFR BLD: 0.7 % (ref 0–1.5)
OXYHGB MFR BLDA: 97.4 % (ref 94–97)
P-R INTERVAL, ECG05: 122 MS
PCO2 BLDA: 36 MMHG (ref 35–45)
PH BLDA: 7.44 [PH] (ref 7.35–7.45)
PHOSPHATE SERPL-MCNC: 2.6 MG/DL (ref 2.5–4.5)
PO2 BLDA: 108 MMHG (ref 80–105)
POTASSIUM BLDA-SCNC: 3.29 MMOL/L (ref 3.5–5.3)
POTASSIUM SERPL-SCNC: 3.2 MMOL/L (ref 3.5–5.1)
Q-T INTERVAL, ECG07: 298 MS
QRS DURATION, ECG06: 70 MS
QTC CALCULATION (BEZET), ECG08: 447 MS
SAO2 % BLD: 98 % (ref 92–98.5)
SODIUM BLDA-SCNC: 140.8 MMOL/L (ref 135–148)
SODIUM SERPL-SCNC: 145 MMOL/L (ref 136–145)
TROPONIN I SERPL-MCNC: <0.02 NG/ML (ref 0.02–0.05)
TROPONIN I SERPL-MCNC: <0.02 NG/ML (ref 0.02–0.05)
VENTILATION MODE VENT: ABNORMAL
VENTRICULAR RATE, ECG03: 135 BPM

## 2018-08-26 PROCEDURE — 36600 WITHDRAWAL OF ARTERIAL BLOOD: CPT

## 2018-08-26 PROCEDURE — 82803 BLOOD GASES ANY COMBINATION: CPT

## 2018-08-26 PROCEDURE — 83735 ASSAY OF MAGNESIUM: CPT

## 2018-08-26 PROCEDURE — 77010033678 HC OXYGEN DAILY

## 2018-08-26 PROCEDURE — 72148 MRI LUMBAR SPINE W/O DYE: CPT

## 2018-08-26 PROCEDURE — 74011250636 HC RX REV CODE- 250/636: Performed by: HOSPITALIST

## 2018-08-26 PROCEDURE — 65610000001 HC ROOM ICU GENERAL

## 2018-08-26 PROCEDURE — 72146 MRI CHEST SPINE W/O DYE: CPT

## 2018-08-26 PROCEDURE — 92610 EVALUATE SWALLOWING FUNCTION: CPT

## 2018-08-26 PROCEDURE — 74011000258 HC RX REV CODE- 258: Performed by: FAMILY MEDICINE

## 2018-08-26 PROCEDURE — 74011000258 HC RX REV CODE- 258: Performed by: INTERNAL MEDICINE

## 2018-08-26 PROCEDURE — 80048 BASIC METABOLIC PNL TOTAL CA: CPT

## 2018-08-26 PROCEDURE — 74011250636 HC RX REV CODE- 250/636: Performed by: FAMILY MEDICINE

## 2018-08-26 PROCEDURE — 70551 MRI BRAIN STEM W/O DYE: CPT

## 2018-08-26 PROCEDURE — 77030020263 HC SOL INJ SOD CL0.9% LFCR 1000ML

## 2018-08-26 PROCEDURE — 74011250636 HC RX REV CODE- 250/636: Performed by: NURSE PRACTITIONER

## 2018-08-26 PROCEDURE — 74011250637 HC RX REV CODE- 250/637: Performed by: NURSE PRACTITIONER

## 2018-08-26 PROCEDURE — 82962 GLUCOSE BLOOD TEST: CPT

## 2018-08-26 PROCEDURE — 74011000258 HC RX REV CODE- 258: Performed by: NURSE PRACTITIONER

## 2018-08-26 PROCEDURE — 72141 MRI NECK SPINE W/O DYE: CPT

## 2018-08-26 PROCEDURE — 74011250637 HC RX REV CODE- 250/637: Performed by: FAMILY MEDICINE

## 2018-08-26 PROCEDURE — 74011636637 HC RX REV CODE- 636/637: Performed by: NURSE PRACTITIONER

## 2018-08-26 PROCEDURE — 74011250636 HC RX REV CODE- 250/636: Performed by: INTERNAL MEDICINE

## 2018-08-26 PROCEDURE — 84100 ASSAY OF PHOSPHORUS: CPT

## 2018-08-26 PROCEDURE — 97165 OT EVAL LOW COMPLEX 30 MIN: CPT

## 2018-08-26 PROCEDURE — 36415 COLL VENOUS BLD VENIPUNCTURE: CPT

## 2018-08-26 PROCEDURE — 84484 ASSAY OF TROPONIN QUANT: CPT

## 2018-08-26 PROCEDURE — 93005 ELECTROCARDIOGRAM TRACING: CPT | Performed by: NURSE PRACTITIONER

## 2018-08-26 RX ORDER — LORAZEPAM 2 MG/ML
INJECTION INTRAMUSCULAR
Status: ACTIVE
Start: 2018-08-26 | End: 2018-08-27

## 2018-08-26 RX ORDER — LORAZEPAM 2 MG/ML
1 INJECTION INTRAMUSCULAR AS NEEDED
Status: DISCONTINUED | OUTPATIENT
Start: 2018-08-26 | End: 2018-08-27 | Stop reason: HOSPADM

## 2018-08-26 RX ORDER — CEPHALEXIN 500 MG/1
500 CAPSULE ORAL EVERY 6 HOURS
Status: DISCONTINUED | OUTPATIENT
Start: 2018-08-26 | End: 2018-08-26

## 2018-08-26 RX ORDER — LORAZEPAM 2 MG/ML
1 INJECTION INTRAMUSCULAR
Status: DISCONTINUED | OUTPATIENT
Start: 2018-08-26 | End: 2018-08-27 | Stop reason: HOSPADM

## 2018-08-26 RX ORDER — POTASSIUM CHLORIDE 14.9 MG/ML
20 INJECTION INTRAVENOUS
Status: COMPLETED | OUTPATIENT
Start: 2018-08-27 | End: 2018-08-27

## 2018-08-26 RX ADMIN — SUMATRIPTAN SUCCINATE 50 MG: 50 TABLET ORAL at 10:04

## 2018-08-26 RX ADMIN — LORAZEPAM 1 MG: 2 INJECTION INTRAMUSCULAR; INTRAVENOUS at 16:00

## 2018-08-26 RX ADMIN — Medication 5 ML: at 14:41

## 2018-08-26 RX ADMIN — LORAZEPAM 1 MG: 2 INJECTION INTRAMUSCULAR; INTRAVENOUS at 10:05

## 2018-08-26 RX ADMIN — SODIUM CHLORIDE 1000 MG: 9 INJECTION, SOLUTION INTRAVENOUS at 17:28

## 2018-08-26 RX ADMIN — SODIUM CHLORIDE 1000 ML: 900 INJECTION, SOLUTION INTRAVENOUS at 16:59

## 2018-08-26 RX ADMIN — LORAZEPAM 1 MG: 2 INJECTION INTRAMUSCULAR; INTRAVENOUS at 19:58

## 2018-08-26 RX ADMIN — LEVETIRACETAM 1000 MG: 100 INJECTION, SOLUTION INTRAVENOUS at 17:01

## 2018-08-26 RX ADMIN — Medication 10 ML: at 21:45

## 2018-08-26 RX ADMIN — INSULIN LISPRO 2 UNITS: 100 INJECTION, SOLUTION INTRAVENOUS; SUBCUTANEOUS at 11:49

## 2018-08-26 RX ADMIN — POTASSIUM CHLORIDE 20 MEQ: 200 INJECTION, SOLUTION INTRAVENOUS at 23:53

## 2018-08-26 RX ADMIN — CEFTRIAXONE SODIUM 1 G: 1 INJECTION, POWDER, FOR SOLUTION INTRAMUSCULAR; INTRAVENOUS at 19:30

## 2018-08-26 RX ADMIN — LORAZEPAM 1 MG: 2 INJECTION INTRAMUSCULAR; INTRAVENOUS at 16:23

## 2018-08-26 RX ADMIN — LORAZEPAM 1 MG: 2 INJECTION INTRAMUSCULAR; INTRAVENOUS at 00:11

## 2018-08-26 RX ADMIN — Medication 5 ML: at 05:57

## 2018-08-26 RX ADMIN — LORAZEPAM 1 MG: 2 INJECTION INTRAMUSCULAR; INTRAVENOUS at 14:37

## 2018-08-26 RX ADMIN — CEPHALEXIN 500 MG: 500 CAPSULE ORAL at 14:06

## 2018-08-26 NOTE — PROGRESS NOTES
Problem: Falls - Risk of  Goal: *Absence of Falls  Document Flakito Fall Risk and appropriate interventions in the flowsheet.    Outcome: Progressing Towards Goal  Fall Risk Interventions:  Mobility Interventions: Bed/chair exit alarm, Patient to call before getting OOB, PT Consult for mobility concerns, OT consult for ADLs, Communicate number of staff needed for ambulation/transfer         Medication Interventions: Evaluate medications/consider consulting pharmacy, Bed/chair exit alarm, Patient to call before getting OOB    Elimination Interventions: Call light in reach, Bed/chair exit alarm, Patient to call for help with toileting needs

## 2018-08-26 NOTE — PROGRESS NOTES
Progress Note    Called by nursing for recurrent seizure like activity. Pt was oriented after the events. Asked nursing to have family leave to decrease stimulation. No further episodes of seizures noted after the family left. Vitals stable. Will change all meds to iv at this time to decrease stimulation and although some concern for pseudoseizures at this time want to decrease the risk of aspiration. Would consider psychiatric evaluation if further neurological evaluation negative. We will continue to follow.

## 2018-08-26 NOTE — PROGRESS NOTES
Dr. Wilfredo Curtis notified of pt's condition. Order obtained to have family leave room to decrease stimulus. Family asked to wait in waiting room. Pt verbalizing and crying asking for family to stay. Pt and family explained that physician wants stimulus decreased. Family and patient agreeable. RT to bedside to obtain ABG.

## 2018-08-26 NOTE — PROGRESS NOTES
Notified that patient had another seizure, charge nurse, hospitalist, and other nurses in room upon my arrival. All seizure activity had stopped and patient post-ictal.1 mg of IV  Ativan was order and given at 1600.

## 2018-08-26 NOTE — PROGRESS NOTES
[facility] 81 CRITICAL CARE OUTREACH NURSE PROGRESS REPORT      SUBJECTIVE: Called to assess patient secondary to nurse concern. MEWS Score: 1 (08/26/18 0892)  Vitals:    08/25/18 1101 08/25/18 1437 08/25/18 2025 08/26/18 0212   BP: 115/77 109/63 131/77 109/69   Pulse: 93 (!) 104 66 100   Resp: 16 18 20 18   Temp: 98.5 °F (36.9 °C) 98.3 °F (36.8 °C) 98.3 °F (36.8 °C) 98.4 °F (36.9 °C)   SpO2: 97% 96% 97% 94%   Weight:       Height:            OBJECTIVE: On arrival to room, I found patient to be resting in bed, more calm. ASSESSMENT:  After tele-neuro consult, MRI staff called in for stat MRI, patient given sedating meds for claustrophobia and MRI completed @ 0150 AM. MRI results not yet present in chart, primary RN recently spoke to MRI staff who stated the radiologist was reading MRI. Patient is now more calm, resting in bed. VSS. NAD noted. PLAN:  Will continue to follow per outreach protocol.

## 2018-08-26 NOTE — REHAB NOTE
STG: Pt will tolerate regular consistency diet with honey thick liquids  STG: Pt will participate with modified barium swallow study x1 if indicated  STG: Pt will participate speech/langugae evaluation  LTG: Pt will tolerate the least restrictive diet at discharge without respiratory compromise    Speech language pathology: bedside swallow note: Initial Assessment    NAME/AGE/GENDER: Michael Camacho is a 41 y.o. female  DATE: 8/26/2018  PRIMARY DIAGNOSIS: MS (multiple sclerosis) ([facility] [address]       ICD-10: Treatment Diagnosis: Dysphagia, oropharyngeal phase R13.12  INTERDISCIPLINARY COLLABORATION: Registered Nurse and Physician  PRECAUTIONS/ALLERGIES: Meperidine and Vioxx [rofecoxib] ASSESSMENT:Based on the objective data described below, Ms. Amrita Jalloh presents with signs of penetration with thin liquids at bedside. Patient seemed fatigued and reported a headache and visual problems since her \"episode/seizure\" last night. Patient with mild right labial and lingual droop noted. Patient with slight slur but the patient was very astute and aware of what was going on. When we discussing her thickened liquids she was concerned with how many carbs were in the thickener because she needed to watch her carbs due to her steroids were affecting her blood sugars. .  Patient will benefit from skilled intervention to address the below impairments. ?????? ? ? This section established at most recent assessment??????????  PROBLEM LIST (Impairments causing functional limitations):  1. Slurred speech, mild  2. Mild labial and lingual droop  3. Delayed swallow response  4. Weak laryngeal elevation  REHABILITATION POTENTIAL FOR STATED GOALS: Good  PLAN OF CARE:   Patient will benefit from skilled intervention to address the following impairments.   RECOMMENDATIONS AND PLANNED INTERVENTIONS (Benefits and precautions of therapy have been discussed with the patient.):  · PO:  Regular  · Liquids:  honey  MEDICATIONS:  · With Thickened Liquid  COMPENSATORY STRATEGIES/MODIFICATIONS INCLUDING:  · Small sips and bites  OTHER RECOMMENDATIONS (including follow up treatment recommendations):   · Oral motor exercises  · Laryngeal exercises  RECOMMENDED DIET MODIFICATIONS DISCUSSED WITH:  · Medical Sub-Specialist  · Nursing  · Patient  FREQUENCY/DURATION: Continue to follow patient 2 times a week for duration of hospital stay to address above goals. RECOMMENDED REHABILITATION/EQUIPMENT: (at time of discharge pending progress): Due to the probability of continued deficits (see above) this patient will likely need continued skilled speech therapy after discharge. SUBJECTIVE:   \"I have been coughing since this morning\"  History of Present Injury/Illness: Ms. Jeana Dunn  has a past medical history of Autoimmune disease ([facility] [address] MS (multiple sclerosis) ([facility] [address] (12/5/2012); and Other ill-defined conditions(799.89). She also has no past medical history of Arrhythmia; Arthritis; Asthma; CAD (coronary artery disease); Cancer ([facility] [address] Chronic kidney disease; Chronic pain; Coagulation defects; COPD; Diabetes ([address] GERD (gastroesophageal reflux disease); Heart failure ([address] Hypertension; Liver disease; Psychiatric disorder; PUD (peptic ulcer disease); Seizures ([facility] [address] Stroke [facility]); Thromboembolus ([facility] [address] or Thyroid disease. .  She also  has a past surgical history that includes hx orthopaedic; hx other surgical; hx gyn; and pr colonoscopy flx dx w/collj spec when pfrmd (6/9/2010). Present Symptoms: Signs of penetration with thin liquids, mild slur in speech, mild lingual deviation and labial droop  Pain Intensity 1: 7  Pain Location 1: Head  Pain Orientation 1: Anterior  Pain Intervention(s) 1: Nurse notified  Current Medications:   No current facility-administered medications on file prior to encounter. Current Outpatient Prescriptions on File Prior to Encounter   Medication Sig Dispense Refill    INTRAUTERINE DEVICE, IUD, IU by IntraUTERine route.       Diphenhydramine-Acetaminophen (PERCOGESIC) 12.5-325 mg tab Take  by mouth.  COPAXONE SC by SubCUTAneous route. Current Dietary Status:     Regular with honey thick liquids    History of reflux:      Reflux medication:  Social History/Home Situation:    Home Environment: Private residence  # Steps to Enter: 0  One/Two Story Residence: Two story, live on 1st floor  Living Alone: No  Support Systems: Spouse/Significant Other/Partner, Child(roberto)  Patient Expects to be Discharged to[de-identified] Private residence  Current DME Used/Available at Home: None  OBJECTIVE:   Respiratory Status:        CXR Results:  MRI/CT Results:  Oral Motor Structure/Speech:  Oral-Motor Structure/Motor Speech  Labial: Right droop  Dentition: Intact  Lingual: Right deviation  Velum: No impairment  Mandible: No impairment    Cognitive and Communication Status:  Neurologic State: Alert; Other (Comment)  Orientation Level: Appropriate for age  Cognition: Appropriate decision making  Perception: Appears intact  Perseveration: No perseveration noted  Safety/Judgement: Awareness of environment; Insight into deficits    BEDSIDE SWALLOW EVALUATION  Oral Assessment:  Oral Assessment  Labial: Right droop  Dentition: Intact  Lingual: Right deviation  Velum: No impairment  Mandible: No impairment  P.O. Trials:  Patient Position: Patient sitting upright at 90 degrees    The patient was given 1-2 tsp amounts of the following:   Consistency Presented: Thin liquid;Puree  How Presented: Self-fed/presented;SLP-fed/presented;Cup/sip;Straw;Spoon; Successive swallows    ORAL PHASE:  Bolus Acceptance: No impairment  Bolus Formation/Control: No impairment  Propulsion: Delayed (# of seconds)     Oral Residue: None    PHARYNGEAL PHASE:  Initiation of Swallow: Delayed (# of seconds)  Laryngeal Elevation: Weak  Aspiration Signs/Symptoms: Delayed cough/throat clear  Vocal Quality: No impairment  Cues for Modifications: None  Effective Modifications: None  Comments: Patient with delayed cough and throat clear after every water presentation. Stated this has begun since her episode last night. Patient with delayed swallow once bolus was in mouth. Patient reported headache and fogginess since episode last night       OTHER OBSERVATIONS:  Rate/bite size: WNL   Endurance: WNL   Comments: Tool Used: Dysphagia Outcome and Severity Scale (NATE)    Score Comments   Normal Diet  [] 7 With no strategies or extra time needed   Functional Swallow  [] 6 May have mild oral or pharyngeal delay       Mild Dysphagia    [] 5 Which may require one diet consistency restricted (those who demonstrate penetration which is entirely cleared on MBS would be included)   Mild-Moderate Dysphagia  [] 4 With 1-2 diet consistencies restricted       Moderate Dysphagia  [] 3 With 2 or more diet consistencies restricted       Moderately Severe Dysphagia  [] 2 With partial PO strategies (trials with ST only)       Severe Dysphagia  [] 1 With inability to tolerate any PO safely          Score:  Initial: 5 Most Recent: X (Date: -- )   Interpretation of Tool: The Dysphagia Outcome and Severity Scale (NATE) is a simple, easy-to-use, 7-point scale developed to systematically rate the functional severity of dysphagia based on objective assessment and make recommendations for diet level, independence level, and type of nutrition. Score 7 6 5 4 3 2 1   Modifier CH CI CJ CK CL CM CN   ?  Swallowing:     - CURRENT STATUS: CJ - 20%-39% impaired, limited or restricted    - GOAL STATUS:  CH - 0% impaired, limited or restricted    - D/C STATUS:  ---------------To be determined---------------  Payor: BLUE CROSS / Plan: [facility] / Product Type: PPO /     TREATMENT:    (In addition to Assessment/Re-Assessment sessions the following treatments were rendered)  Assessment/Reassessment only, no treatment provided today  MODALITIES: ORAL MOTOR  EXERCISES:                                                                                                                                                                      LARYNGEAL / PHARYNGEAL EXERCISES:                                                                                                                                     __________________________________________________________________________________________________  Safety:   After treatment position/precautions:  · Visitors at bedside  · Upright in Bed  Treatment Assessment:  none.     Recommendations/Intent for next treatment session: \"Treatment next visit will focus on Assess patient at bedside to see if can advance diet or need to complete MBS - Oral motor assessment and exercise   Total Treatment Duration:         Aruna Huerta, SLP

## 2018-08-26 NOTE — PROGRESS NOTES
TRANSFER - OUT REPORT:    Verbal report given to TAMAR Neri(name) on [facility]  being transferred to ICU RM 3103(unit) for routine progression of care       Report consisted of patients Situation, Background, Assessment and   Recommendations(SBAR). Information from the following report(s) Kardex was reviewed with the receiving nurse. Lines:   Peripheral IV 08/26/18 Right; Inner Arm (Active)   Site Assessment Clean, dry, & intact 8/26/2018 12:05 PM   Phlebitis Assessment 0 8/26/2018 12:05 PM   Infiltration Assessment 0 8/26/2018 12:05 PM   Dressing Status Clean, dry, & intact 8/26/2018 12:05 PM   Dressing Type Tape;Transparent 8/26/2018 12:05 PM   Hub Color/Line Status Blue; Infusing 8/26/2018 12:05 PM        Opportunity for questions and clarification was provided.       Patient transported with:   ICU Nurse, pt belongings, family members

## 2018-08-26 NOTE — PROGRESS NOTES
LEAPFROG PROTOCOL NOTE    Narinder Roe  8/26/2018    The patient is currently in the critical care setting managed by Dr. Farrukh Alexis with seizures. The patient's chart is reviewed and the patient is discussed with the staff. Patient is currently hemodynamically stable. Patient has no needs identified for Intensivist management in the critical care setting at this time. Sz activity not clearly tonic clonic. No EEG available until tomorrow. Consider ABG if prolonged event to better determine if these may be pseudoseizures. Please notify us if can be of assistance. No charge billed to the patient. Thank you.     Samia Jaramillo MD

## 2018-08-26 NOTE — PROGRESS NOTES
Pt awake at this time. Pt using finger to trace letters on hand to communicate. Pt asking her location, her daughter's location. Pt reoriented to surroundings and situation.  at bedside. Verbal reassurance given.

## 2018-08-26 NOTE — PROGRESS NOTES
Pt transferred to room 3103 from room 720. Pt alert on transfer but not talking. Dual skin assessment performed by myself and Vy Atkinson. Yellowish bruising noted to right lateral upper leg.

## 2018-08-26 NOTE — PROGRESS NOTES
Phone call received from 7th floor staff secondary to changes in neuro exam.  Pt extremely anxious. Reports lack of sensation now progressed to L side. Upon exam, pt with withdraw to deep painful stimulus to LLE. LUE with weak grasp. Pt reports ability for feel dull sensations in all extremities. Attempted to continue exam, but pt becomes upset with further questioning. Phone call placed to Dr. Marlon Ray to update with findings of neuro exam.  EKG with sinus tach. Order received for STAT tele-neurology evaluation.

## 2018-08-26 NOTE — PROGRESS NOTES
Pt reported a sharp chest pain which was communicated to [facility] [place] [place] staff, then patient experieced what sounds like a full body seizure last approx 10 sec. Pt had gotten a dose of ativan at 1430 prior to this seizure. Pt was given a second dose of ativan after the seizure. Pt then experienced a second longer full body seizure approximately 10 minutes after the prior one. Orders for stat EKG, trop x 3. Pt on oxygen, on left side, physician at bedside.     JAKUB FergusonC

## 2018-08-26 NOTE — PROGRESS NOTES
Hospitalist Progress Note     Admit Date:  2018  1:37 PM   Name:  Lary Way   Age:  41 y.o.  :  1970   MRN:  [id number]   PCP:  PROVIDER UNKNOWN  Treatment Team: Attending Provider: Cristal Paz MD; Consulting Provider: Cristal Paz MD; Utilization Review: Murtaza Rascon; Charge Nurse: Nikkie Shelley RN; Consulting Provider: John Paul Garner; Charge Nurse: Nasreen Erickson    Subjective:   CC: Blurred vision with pins and needles right side    Pt is a 40 yo female with PMH of MS with last flare . Pt presented with history of her right eye vision becoming blurry over the past week or so. She saw the ophthalmologist who dilated her eyes and felt that it was likely her optic nerve. A few days later she developed pins and needles on her right side from her face to her toes. 48 hrs prior to admit she had right sided weakness develop to the point where she was not able to even lift her arm or leg. Pt has had urinary incontinence over the past month but no loss of bowel control. In the ED pt received 1 gm of solumedrol. Pt has not been on any MS medications since she has been trying to manage the MS with diet. Pt had a tele-neuro consult  with Dr. Tatiana Ramos. Pt has had difficulty sleeping 2/2 to steroid therapy. Pt's blood sugar was elevated at 214 . MRIs did not show any new lesions however pt does have bilateral pleural effusions with bibasilar atelectasis. Pt had an episode last night when her right arm and hand started shaking, then her body was shaking. Repeat MRIs neg. This am pt very emotional and tearful regarding her \"seizures\" - repeatedly saying \"I don't talk like this\". Offered reassurance.   Will get in house neuro consult in am.      Objective:     Patient Vitals for the past 24 hrs:   Temp Pulse Resp BP SpO2   18 1145 97.9 °F (36.6 °C) (!) 110 18 129/87 99 %   18 0713 98.2 °F (36.8 °C) (!) 104 18 100/72 96 %   18 0212 98.4 °F (36.9 °C) 100 18 109/69 94 %   08/25/18 2025 98.3 °F (36.8 °C) 66 20 131/77 97 %   08/25/18 1437 98.3 °F (36.8 °C) (!) 104 18 109/63 96 %     Oxygen Therapy  O2 Sat (%): 99 % (08/26/18 1145)  Pulse via Oximetry: 105 beats per minute (08/24/18 1720)  O2 Device: Room air (08/25/18 1437)    Intake/Output Summary (Last 24 hours) at 08/26/18 1223  Last data filed at 08/26/18 0556   Gross per 24 hour   Intake              719 ml   Output              400 ml   Net              319 ml         General:    Pale. Awake and alert, appears stated age  Head:  Normocephalic, atraumatic  Eyes:  Extraocular movements intact, normal sclera, right eye vision blurry  CV:   RRR. No  Murmurs, clicks, or gallops  Lungs:   Unlabored, no cyanosis  Abdomen:   Soft, nondistended, nontender. Extremities: Warm and dry. No cyanosis or edema. Right sided weakness, pins and needles sensation   Skin:     No rashes or jaundice. Neuro:  No gross focal deficits  Psych:  Mood very tearful and anxious affect, A&O x 3    Data Review:  I have reviewed all labs, meds, telemetry events, and studies from the last 24 hours.     Recent Results (from the past 24 hour(s))   GLUCOSE, POC    Collection Time: 08/25/18  5:42 PM   Result Value Ref Range    Glucose (POC) 95 65 - 100 mg/dL   URINALYSIS W/ RFLX MICROSCOPIC    Collection Time: 08/25/18  7:39 PM   Result Value Ref Range    Color YELLOW      Appearance CLEAR      Specific gravity 1.026 (H) 1.001 - 1.023      pH (UA) 6.0 5.0 - 9.0      Protein TRACE (A) NEG mg/dL    Glucose NEGATIVE  mg/dL    Ketone NEGATIVE  NEG mg/dL    Bilirubin NEGATIVE  NEG      Blood TRACE (A) NEG      Urobilinogen 0.2 0.2 - 1.0 EU/dL    Nitrites NEGATIVE  NEG      Leukocyte Esterase SMALL (A) NEG      WBC 10-20 0 /hpf    RBC 5-10 0 /hpf    Epithelial cells 0-3 0 /hpf    Bacteria 1+ (H) 0 /hpf    Casts 0-3 0 /lpf   EKG, 12 LEAD, INITIAL    Collection Time: 08/25/18  8:45 PM   Result Value Ref Range    Ventricular Rate 135 BPM Atrial Rate 135 BPM    P-R Interval 122 ms    QRS Duration 70 ms    Q-T Interval 298 ms    QTC Calculation (Bezet) 447 ms    Calculated P Axis 59 degrees    Calculated R Axis 54 degrees    Calculated T Axis 66 degrees    Diagnosis       Sinus tachycardia  Possible Left atrial enlargement  Borderline ECG     GLUCOSE, POC    Collection Time: 08/25/18  8:47 PM   Result Value Ref Range    Glucose (POC) 272 (H) 65 - 100 mg/dL   GLUCOSE, POC    Collection Time: 08/26/18  7:11 AM   Result Value Ref Range    Glucose (POC) 129 (H) 65 - 100 mg/dL   GLUCOSE, POC    Collection Time: 08/26/18 11:32 AM   Result Value Ref Range    Glucose (POC) 168 (H) 65 - 100 mg/dL        All Micro Results     None          Current Meds:  Current Facility-Administered Medications   Medication Dose Route Frequency    SUMAtriptan (IMITREX) tablet 50 mg  50 mg Oral BID PRN    insulin lispro (HUMALOG) injection 0-10 Units  0-10 Units SubCUTAneous AC&HS    LORazepam (ATIVAN) injection 1 mg  1 mg IntraVENous Q4H PRN    0.9% sodium chloride infusion 2,000 mL  2,000 mL IntraVENous CONTINUOUS    sodium chloride (NS) flush 5-10 mL  5-10 mL IntraVENous Q8H    sodium chloride (NS) flush 5-10 mL  5-10 mL IntraVENous PRN    acetaminophen (TYLENOL) tablet 650 mg  650 mg Oral Q4H PRN    HYDROcodone-acetaminophen (NORCO) 5-325 mg per tablet 1 Tab  1 Tab Oral Q4H PRN    ondansetron (ZOFRAN) injection 4 mg  4 mg IntraVENous Q4H PRN    methylPREDNISolone (PF) (SOLU-MEDROL) 1,000 mg in 0.9% sodium chloride 100 mL IVPB  1,000 mg IntraVENous Q24H       Diet:  DIET CARDIAC    Other Studies (last 24 hours):  Mri Brain Wo Cont    Result Date: 8/26/2018  History: Bilateral weakness and functional change. History of multiple sclerosis EXAM: MRI brain without contrast TECHNIQUE: Multiplanar multisequence imaging is performed.  COMPARISON: 8/25/2018 FINDINGS: Foci of T2 signal prolongation again noted within the corona radiata and centrum semiovale, unchanged when compared with the prior exam. No focal area of restricted diffusion demonstrated to suggest an acute or early subacute infarct. Grade and echo sequence imaging has not changed when compared with the prior exam. Normal flow voids present within the major intracranial vessels. IMPRESSION: Stable exam.    Mri Brain W Wo Cont    Result Date: 8/25/2018  History: MS exacerbation with acute onset right-sided weakness EXAM: MRI brain with and without contrast TECHNIQUE: Multiplanar multisequence imaging is performed both before and after the uneventful administration of 12 cc Dotarem COMPARISON: 7/17/2014 FINDINGS: There are foci of T2 signal prolongation seen in the corona radiata and centrum semiovale. These are slightly more prominent than seen previously, particularly on the left within the corona radiata. No abnormal parenchymal or meningeal enhancement demonstrated. The ventricles are normal in size, shape, and configuration. There is no extra-axial fluid collection, mass, or mass effect. There is no midline shift. The basilar cisterns are patent. Diffusion-weighted imaging demonstrates no restricted diffusion. Gradient echo sequence imaging demonstrates no blooming artifact to suggest retained intracranial blood products. IMPRESSION: Nonspecific areas of T2 signal prolongation again seen within the corona radiata and centrum semiovale bilaterally. These are slightly more prominent on the left when compared with the prior exam. Plaques from multiple sclerosis are certainly a diagnostic possibility, particularly given the patient's clinical history, amongst other possible etiologies. No additional interval change. Mri Cerv Spine Wo Cont    Result Date: 8/26/2018  History: MS; acute neuro change w  leg weakness Exam: MRI Cervical Spine without contrast Technique: Sagittal T1, T2, STIR, axial T2 and gradient echo sequences are available for review.  Comparison: 8/25/2018 Findings: No new lesion within the cervical cord. There is multilevel cervical spondylosis, unchanged when compared with the prior study. Impression: Stable exam. Multilevel cervical spondylosis. Please see yesterday's exam for level by level description. Mri Cerv Spine W Wo Cont    Result Date: 8/25/2018  History: New onset right-sided weakness. Known multiple sclerosis. EXAM: MRI cervical spine with and without contrast TECHNIQUE: Multiplanar multisequence imaging is performed both before and after the uneventful ministration of 12 cc Dotarem COMPARISON: 7/18/2014 FINDINGS: Degenerative disc signal noted C2-3 through C5-6 with disc osteophyte complex formation at multiple levels. There is normal signal within the cervical spinal cord throughout. Limited evaluation of the posterior fossa, clivus, and craniocervical junction is normal. C2-3: Normal C3-4: There is a shallow disc osteophyte complex at this level without spinal stenosis or neural foraminal narrowing. C4-5: There is a broad-based disc osteophyte complex at this level with mild spinal stenosis and moderate right neural foraminal narrowing. C5-6: There is a shallow disc osteophyte complex with facet arthropathy. There is mild spinal stenosis and mild bilateral neural foraminal narrowing. C6-7: No spinal canal or neural foraminal narrowing. C7-T1: No spinal canal or neural foraminal narrowing. IMPRESSION: 1. No evidence of multiple sclerosis involving the cervical spinal cord. 2. Multilevel cervical spondylosis. There is new moderate right neural foraminal narrowing at C4-5. There is also mild spinal stenosis at C4-5 and C5-6. At C5-6, there is mild bilateral neural foraminal narrowing.     Mri Thorac Spine Wo Cont    Result Date: 8/26/2018  History: Multiple sclerosis with acute bilateral lower extremity weakness EXAM: MRI thoracic spine without contrast TECHNIQUE: Multiplanar multisequence imaging is performed COMPARISON: 8/25/2018 FINDINGS: There is no new lesion within the thoracic spinal cord. Degenerative disc signal present at nearly all levels. No new area of spinal stenosis or neural foraminal narrowing. There are bilateral pleural effusions with bibasilar atelectasis. IMPRESSION: Stable appearance of the thoracic spine and spinal cord when compared with the prior study. There are bilateral pleural effusions with bibasilar atelectasis. This finding is new when compared with the prior study. Mri Thorac Spine W Wo Cont    Result Date: 8/25/2018  History: Multiple sclerosis exacerbation. New onset right-sided weakness. EXAM: MRI thoracic spine with and without contrast TECHNIQUE: Multiplanar multisequence imaging is performed both before and after the uneventful administration of 12 cc Dotarem No comparison FINDINGS: There is degenerative disc signal present at nearly all levels of the thoracic spine with varying degrees of disc height loss. Mild subarticular reactive endplate change present within the mid thoracic spine. However, there is normal signal within the thoracic spinal cord throughout. Axial imaging demonstrates no additional abnormality. There is no spinal stenosis or neural foraminal narrowing. Postcontrast imaging demonstrates no abnormal enhancement within the thoracic spine. IMPRESSION: 1. No evidence of multiple sclerosis within the thoracic spine. 2. Degenerative disc disease of the thoracic spine without spinal stenosis or neural foraminal narrowing. Mri Lumb Spine Wo Cont    Result Date: 8/26/2018  History: MS; acute neuro changes Exam: MRI lumbar spine without contrast Technique: Axial and sagittal T1 and T2-weighted sequences are available for review. A sagittal STIR sequence is also available for review. No comparison. Findings: There is evidence of marrow reconversion. Degenerative disc signal noted L3-4 through L5-S1 with varying degrees of disc height loss. The conus is normal in configuration and signal intensity throughout.  Axial imaging demonstrates no abnormal retroperitoneal lesions. L2-3: There is mild facet arthropathy without central or neural foraminal narrowing. L3-L4: There is a small disc bulge without central or neural foraminal stenosis. L4-L5: There is a small disc bulge with fluid in the disc. Subarticular reactive endplate change noted at this level. No central or neural foraminal narrowing. L5-S1: There is a disc bulge with mild facet arthropathy. There is mild left neural foraminal narrowing. No right neural foraminal stenosis or central stenosis. Impression: 1. Evidence of marrow reconversion. 2. At L4-5, there is a small disc bulge with fluid in the disc. There is also subarticular reactive endplate change at this level. This most likely reflects the sequela of degenerative disc disease and discogenic edema. However, given the fact that there is fluid within the disc, early discitis could have an identical MR appearance. 3. Multilevel lumbar spondylosis with mild left neural foraminal narrowing at L5-S1. Assessment and Plan:     Hospital Problems as of 8/26/2018  Never Reviewed          Codes Class Noted - Resolved POA    * (Principal)Multiple sclerosis exacerbation ([facility] [address] ICD-10-CM: G35  ICD-9-CM: 340  7/17/2014 - Present Yes        MS (multiple sclerosis) ([facility] [address] ICD-10-CM: Franklin Jon  ICD-9-CM: 480  12/5/2012 - Present Yes              A/P:    1.MS exacerbation  -Neuro consult  -neuro checks q shift  -IV solumedrol x 3 days  -MRIs head, c and t spine w/wo contrast  -TSH, sed rate, NMO antibodies, MASHA  -PT/OT  -SLP for speech and swallow    2. Urinary incontinence  -UA with leuks and 1+bacteria  -Start cephalexin QID x 5 d    3. Hyperglycemia  -Monitor glucose - POC checks  -SSI    DC planning/Dispo:  Home  DVT ppx:  SCDs    Code status:  Full  Medical decision maker: Spouse Lamberto    Case reviewed with supervising physician - COCO Jamison MD    Signed:  Tawana Castleman, NP-C

## 2018-08-26 NOTE — PROGRESS NOTES
Pt with approx 10 sec episode of shaking. Pt noted to be hyperventilating after event. Dr. Abdirahman Ramos in unit and to bedside. Order obtained for ABG if seizures continue. No incontinence noted. 1719: Pt with another 5-10 second episode of stiffness and shaking. No incontinence noted. Spouse at bedside. Marly Coombs, hospitalist NP notified. NO new orders obtained. Will continue to monitor closely.

## 2018-08-26 NOTE — PROGRESS NOTES
Family in waiting room, pt asked if she was experiencing any stress that she wanted to discuss. Pt denies.

## 2018-08-26 NOTE — PROGRESS NOTES
Pt having multiple episodes of shaking and becoming rigid lasting approx 10 seconds each episode. Pt hyperventilating after each episode. No incontinence noted. Hospitalist paged.

## 2018-08-26 NOTE — PROGRESS NOTES
Problem: Self Care Deficits Care Plan (Adult)  Goal: *Acute Goals and Plan of Care (Insert Text)  1. Patient will feed self entire meal with setup and adaptive utensils as needed. 2. Patient will complete upper body dressing and bathing with setup and adaptive equipment as needed. 3. Patient will participate in BUE therapeutic exercises to increase strength in BUE to at least 4/5 for participation in ADLs and functional transfers. 4. Patient will participate in [address] [place] [address] therapeutic activities to increase coordination in BUE to [facility] for bimanual fine motor ADLs. 5. Patient will attempt standing in preparation for functional transfers. Timeframe: 7 visits       OCCUPATIONAL THERAPY: Initial Assessment 8/26/2018  INPATIENT: Hospital Day: 3  Payor: BLUE CROSS / Plan: [place] [facility] / Product Type: PPO /      NAME/AGE/GENDER: Bismark Tay is a 41 y.o. female   PRIMARY DIAGNOSIS:  MS (multiple sclerosis) ([facility] [address] Multiple sclerosis exacerbation ([facility] [address] Multiple sclerosis exacerbation ([facility] [address]        ICD-10: Treatment Diagnosis:    · Generalized Muscle Weakness (M62.81)  · Other lack of cordination (R27.8)   Precautions/Allergies:     Meperidine and Vioxx [rofecoxib]      ASSESSMENT:     Ms. Kia Carmona is a 41year old female admitted with slurred speech, RLE numbness, and difficulty walking. Found to be in MS exacerbation. Patient lives with  and children. 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 She is R hand dominant. Patient supine in bed upon arrival with spouse and child at bedside. Initially on her computer helping her student over the Internet. Alert and oriented. Agreeable to OT evaluation. Reports headache 7/10. Tearful at times. States her speech is not right. BUE assessment reveals BUE weakness: RUE 1/5 to 2-/5, LUE 3-/5 to 3+/5. Patient reports decreased sensation in BUE, worse in RUE compared to LUE. Required moderate assistance for rolling in supine.  Patient declined out of bed activity due to fatigue from earlier ADL with CNA. Patient is currently functioning far below her baseline and would benefit from continued occupational therapy to increase independence and safety. Will follow. This section established at most recent assessment   PROBLEM LIST (Impairments causing functional limitations):  1. Decreased Strength  2. Decreased ADL/Functional Activities  3. Decreased Transfer Abilities  4. Decreased Ambulation Ability/Technique  5. Decreased Balance  6. Increased Pain  7. Decreased Activity Tolerance   INTERVENTIONS PLANNED: (Benefits and precautions of occupational therapy have been discussed with the patient.)  1. Activities of daily living training  2. Adaptive equipment training  3. Neuromuscular re-eduation  4. Therapeutic activity  5. Therapeutic exercise     TREATMENT PLAN: Frequency/Duration: Follow patient 3x/ week to address above goals. Rehabilitation Potential For Stated Goals: Good     RECOMMENDED REHABILITATION/EQUIPMENT: (at time of discharge pending progress): Due to the probability of continued deficits (see above) this patient will likely need continued skilled occupational therapy after discharge. Equipment:    TBD              OCCUPATIONAL PROFILE AND HISTORY:   History of Present Injury/Illness (Reason for Referral):  See H&P  Past Medical History/Comorbidities:   Ms. Barbie Smith  has a past medical history of Autoimmune disease ([address] MS (multiple sclerosis) ([address] (12/5/2012); and Other ill-defined conditions(799.89). She also has no past medical history of Arrhythmia; Arthritis; Asthma; CAD (coronary artery disease); Cancer ([address] Chronic kidney disease; Chronic pain; Coagulation defects; COPD; Diabetes ([address] GERD (gastroesophageal reflux disease); Heart failure ([address] Hypertension; Liver disease; Psychiatric disorder; PUD (peptic ulcer disease); Seizures ([address] Stroke [facility]); Thromboembolus ([address] or Thyroid disease.   Ms. Barbie Smith  has a past surgical history that includes hx orthopaedic; hx other surgical; hx gyn; and pr colonoscopy flx dx w/collj spec when pfrmd (6/9/2010). Social History/Living Environment:   Home Environment: Private residence  # Steps to Enter: 0  One/Two Story Residence: Two story, live on 1st floor  Living Alone: No  Support Systems: Spouse/Significant Other/Partner, Child(roberto)  Patient Expects to be Discharged to[de-identified] Private residence  Current DME Used/Available at Home: None  Prior Level of Function/Work/Activity:  Lives with spouse and family. 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 R handed. Independent with all ADLs, driving. Dominant Side:         RIGHT  Personal Factors:          Sex:  female        Age:  41 y.o. Past/Current Experience:  MS   Number of Personal Factors/Comorbidities that affect the Plan of Care: Brief history (0):  LOW COMPLEXITY   ASSESSMENT OF OCCUPATIONAL PERFORMANCE[de-identified]   Activities of Daily Living:   Basic ADLs (From Assessment) Complex ADLs (From Assessment)   Feeding: Maximum assistance  Oral Facial Hygiene/Grooming: Maximum assistance  Bathing: Maximum assistance  Upper Body Dressing: Maximum assistance  Lower Body Dressing: Maximum assistance  Toileting: Maximum assistance     Grooming/Bathing/Dressing Activities of Daily Living     Cognitive Retraining  Safety/Judgement: Awareness of environment; Fall prevention                       Bed/Mat Mobility  Rolling:  Moderate assistance  Scooting: Supervision       Most Recent Physical Functioning:   Gross Assessment:  AROM: Grossly decreased, non-functional  Strength: Grossly decreased, non-functional  Coordination: Grossly decreased, non-functional  Sensation: Impaired            RUE Strength  R Shoulder Flexion: 1  R Elbow Flexion: 2-  R Elbow Extension: 2-  R Forearm Pronation: 3  R Forearm Supination: 3  R Wrist Flexion: 2-  R Wrist Extension: 2-  R : 2-  LUE Strength  L Shoulder Flexion: 3+  L Elbow Flexion: 3+  L Elbow Extension: 3+  L  Forearm Pronation: 3+  L  Forearm Supination: 3+  L Wrist Flexion: 3  L Wrist Extension: 3  L : 3+  Posture:  Posture (WDL): Within defined limits  Balance:    Bed Mobility:  Rolling: Moderate assistance  Scooting: Supervision  Wheelchair Mobility:     Transfers:               Patient Vitals for the past 6 hrs:   BP BP Patient Position SpO2 Pulse   18 1145 129/87 At rest 99 % (!) 110       Mental Status  Neurologic State: Alert  Orientation Level: Oriented X4  Cognition: Follows commands  Perception: Appears intact  Perseveration: No perseveration noted  Safety/Judgement: Awareness of environment, Fall prevention    LUE Strength  L Shoulder Flexion: 3+  L Elbow Flexion: 3+  L Elbow Extension: 3+  L  Forearm Pronation: 3+  L  Forearm Supination: 3+  L Wrist Flexion: 3  L Wrist Extension: 3  L : 3+  RUE Strength  R Shoulder Flexion: 1  R Elbow Flexion: 2-  R Elbow Extension: 2-  R Forearm Pronation: 3  R Forearm Supination: 3  R Wrist Flexion: 2-  R Wrist Extension: 2-  R : 2-                  Physical Skills Involved:  1. Range of Motion  2. Balance  3. Strength  4. Activity Tolerance  5. Sensation  6. Fine Motor Control  7. Pain (acute) Cognitive Skills Affected (resulting in the inability to perform in a timely and safe manner):  1. None Psychosocial Skills Affected:  1. Habits/Routines  2. Environmental Adaptation  3. Emotional Regulation  4. Self-Awareness  5. Social Roles   Number of elements that affect the Plan of Care: 5+:  HIGH COMPLEXITY   CLINICAL DECISION MAKIN [facility] [address] [id number] AM-PAC 6 Clicks   Daily Activity Inpatient Short Form  How much help from another person does the patient currently need. .. Total A Lot A Little None   1. Putting on and taking off regular lower body clothing? [] 1   [x] 2   [] 3   [] 4   2. Bathing (including washing, rinsing, drying)? [] 1   [x] 2   [] 3   [] 4   3.   Toileting, which includes using toilet, bedpan or urinal?   [] 1 [x] 2   [] 3   [] 4   4. Putting on and taking off regular upper body clothing? [] 1   [x] 2   [] 3   [] 4   5. Taking care of personal grooming such as brushing teeth? [] 1   [x] 2   [] 3   [] 4   6. Eating meals? [] 1   [x] 2   [] 3   [] 4   © 2007, Trustees of [address] [place] [address] [address] [id number], under license to [employer]. All rights reserved      Score:  Initial: 12 Most Recent: X (Date: -- )    Interpretation of Tool:  Represents activities that are increasingly more difficult (i.e. Bed mobility, Transfers, Gait). Score 24 23 22-20 19-15 14-10 9-7 6     Modifier CH CI CJ CK CL CM CN      ? Self Care:     - CURRENT STATUS: CL - 60%-79% impaired, limited or restricted    - GOAL STATUS: CK - 40%-59% impaired, limited or restricted    - D/C STATUS:  ---------------To be determined---------------  Payor[place] BLUE CR[place] [facility] / Product Type: PPO /      Medical Necessity:     · Patient demonstrates good rehab potential due to higher previous functional level. Reason for Services/Other Comments:  · Patient continues to require present interventions due to patient's inability to care for self at baseline level of function.    Use of outcome tool(s) and clinical judgement create a POC that gives a: HIGH COMPLEXITY         TREATMENT:   (In addition to Assessment/Re-Assessment sessions the following treatments were rendered)     Pre-treatment Symptoms/Complaints:    Pain: Initial:   Pain Intensity 1: 7  Pain Location 1: Head  Pain Intervention(s) 1: Nurse notified  Post Session:  Same      Assessment/Reassessment only, no treatment provided today    Braces/Orthotics/Lines/Etc:   · IV  · O2 Device: Room air  Treatment/Session Assessment:    · Response to Treatment:  Tolerated well   · Interdisciplinary Collaboration:   o Occupational Therapist  o Registered Nurse  · After treatment position/precautions:   o Supine in bed  o Bed/Chair-wheels locked  o Bed in low position  o Call light within reach  o RN notified  o Family at bedside   · Compliance with Program/Exercises: Will assess as treatment progresses. · Recommendations/Intent for next treatment session: \"Next visit will focus on advancements to more challenging activities and reduction in assistance provided\".   Total Treatment Duration:  OT Patient Time In/Time Out  Time In: 1132  Time Out: [address] [facility] WESTLEY PatelR/MARY ANNE

## 2018-08-26 NOTE — PROGRESS NOTES
Critical Care Outreach Nurse Progress Report:    Subjective: In to assess pt secondary to nurse concern. MEWS Score: 1 (08/26/18 0010)    Vitals:    08/25/18 2025 08/26/18 0212 08/26/18 0713 08/26/18 1145   BP: 131/77 109/69 100/72 129/87   Pulse: 66 100 (!) 104 (!) 110   Resp: 20 18 18 18   Temp: 98.3 °F (36.8 °C) 98.4 °F (36.9 °C) 98.2 °F (36.8 °C) 97.9 °F (36.6 °C)   SpO2: 97% 94% 96% 99%   Weight:       Height:            Objective: Adult female pt lying in bed. Pain Intensity 1: 7 (08/26/18 1132)  Pain Location 1: Head  Pain Intervention(s) 1: Nurse notified  Patient Stated Pain Goal: 0    Assessment: Pt is lying in bed. Pt is awake and alert. Speech is mildly slurred, but easy to understand. Per pt, this has worsened since \"convulsion\" last night. Pt reports weakness and numbness to right face, arm, and leg. This has been persistent since admission. Pt is tearful and mildly anxious. Plan:   Discussed patient with NP. No new orders obtained. Will continue to follow per outreach protocol.

## 2018-08-26 NOTE — PROGRESS NOTES
Phone call received from 7th floor staff secondary to changes in neuro exam.  Pt extremely anxious. Reports lack of sensation now progressed to L side. Upon exam, pt with withdraw to deep painful stimulus to LLE. LUE with weak grasp. Pt reports ability for feel dull sensations in all extremities. Attempted to continue exam, but pt becomes upset with further questioning. Phone call placed to Dr. Yen Dunn to update with findings of neuro exam.  EKG with sinus tach. Order received for STAT tele-neurology evaluation. Outreach RN at bedside as well. Will continue to follow.

## 2018-08-26 NOTE — PROGRESS NOTES
Pt started complaining of new onset tremor in both hands, new onset numbness and decreased sensation to LUE and LLE. Also complaining of chest pain 7/10. Pt's  states the patient has had \"severl convulsion episodes where her whole body shakes. \" Hospitalist notified. Orders for STAT tele-neuro and EKG. Will continue to monitor closely.

## 2018-08-27 VITALS
TEMPERATURE: 98.5 F | HEART RATE: 94 BPM | RESPIRATION RATE: 16 BRPM | BODY MASS INDEX: 27.36 KG/M2 | SYSTOLIC BLOOD PRESSURE: 124 MMHG | HEIGHT: 64 IN | WEIGHT: 160.27 LBS | OXYGEN SATURATION: 96 % | DIASTOLIC BLOOD PRESSURE: 78 MMHG

## 2018-08-27 LAB
ANA SER QL: NEGATIVE
ANION GAP SERPL CALC-SCNC: 11 MMOL/L (ref 7–16)
AQP4 H2O CHANNEL AB SERPL IA-ACNC: <1.5 U/ML (ref 0–3)
ATRIAL RATE: 115 BPM
BASOPHILS # BLD: 0 K/UL (ref 0–0.2)
BASOPHILS NFR BLD: 0 % (ref 0–2)
BUN SERPL-MCNC: 9 MG/DL (ref 6–23)
CALCIUM SERPL-MCNC: 7.5 MG/DL (ref 8.3–10.4)
CALCULATED P AXIS, ECG09: 46 DEGREES
CALCULATED R AXIS, ECG10: 43 DEGREES
CALCULATED T AXIS, ECG11: 61 DEGREES
CHLORIDE SERPL-SCNC: 106 MMOL/L (ref 98–107)
CO2 SERPL-SCNC: 26 MMOL/L (ref 21–32)
CREAT SERPL-MCNC: 0.64 MG/DL (ref 0.6–1)
DIAGNOSIS, 93000: NORMAL
DIFFERENTIAL METHOD BLD: ABNORMAL
EOSINOPHIL # BLD: 0 K/UL (ref 0–0.8)
EOSINOPHIL NFR BLD: 0 % (ref 0.5–7.8)
ERYTHROCYTE [DISTWIDTH] IN BLOOD BY AUTOMATED COUNT: 12.6 %
GLUCOSE BLD STRIP.AUTO-MCNC: 109 MG/DL (ref 65–100)
GLUCOSE BLD STRIP.AUTO-MCNC: 128 MG/DL (ref 65–100)
GLUCOSE BLD STRIP.AUTO-MCNC: 93 MG/DL (ref 65–100)
GLUCOSE SERPL-MCNC: 140 MG/DL (ref 65–100)
HCT VFR BLD AUTO: 36.8 % (ref 35.8–46.3)
HGB BLD-MCNC: 12 G/DL (ref 11.7–15.4)
IMM GRANULOCYTES # BLD: 0.1 K/UL (ref 0–0.5)
IMM GRANULOCYTES NFR BLD AUTO: 1 % (ref 0–5)
LYMPHOCYTES # BLD: 0.7 K/UL (ref 0.5–4.6)
LYMPHOCYTES NFR BLD: 7 % (ref 13–44)
MCH RBC QN AUTO: 29.9 PG (ref 26.1–32.9)
MCHC RBC AUTO-ENTMCNC: 32.6 G/DL (ref 31.4–35)
MCV RBC AUTO: 91.5 FL (ref 79.6–97.8)
MONOCYTES # BLD: 0.1 K/UL (ref 0.1–1.3)
MONOCYTES NFR BLD: 1 % (ref 4–12)
NEUTS SEG # BLD: 9.1 K/UL (ref 1.7–8.2)
NEUTS SEG NFR BLD: 92 % (ref 43–78)
NRBC # BLD: 0 K/UL (ref 0–0.2)
P-R INTERVAL, ECG05: 132 MS
PLATELET # BLD AUTO: 286 K/UL (ref 150–450)
PMV BLD AUTO: 10.1 FL (ref 9.4–12.3)
POTASSIUM SERPL-SCNC: 3.8 MMOL/L (ref 3.5–5.1)
Q-T INTERVAL, ECG07: 318 MS
QRS DURATION, ECG06: 82 MS
QTC CALCULATION (BEZET), ECG08: 439 MS
RBC # BLD AUTO: 4.02 M/UL (ref 4.05–5.2)
SEE BELOW:, 164879: NORMAL
SODIUM SERPL-SCNC: 143 MMOL/L (ref 136–145)
TROPONIN I SERPL-MCNC: <0.02 NG/ML (ref 0.02–0.05)
VENTRICULAR RATE, ECG03: 115 BPM
WBC # BLD AUTO: 9.9 K/UL (ref 4.3–11.1)

## 2018-08-27 PROCEDURE — 36415 COLL VENOUS BLD VENIPUNCTURE: CPT

## 2018-08-27 PROCEDURE — 77030032490 HC SLV COMPR SCD KNE COVD -B

## 2018-08-27 PROCEDURE — 80048 BASIC METABOLIC PNL TOTAL CA: CPT

## 2018-08-27 PROCEDURE — 82962 GLUCOSE BLOOD TEST: CPT

## 2018-08-27 PROCEDURE — 95816 EEG AWAKE AND DROWSY: CPT | Performed by: PSYCHIATRY & NEUROLOGY

## 2018-08-27 PROCEDURE — 74011000258 HC RX REV CODE- 258: Performed by: INTERNAL MEDICINE

## 2018-08-27 PROCEDURE — 99223 1ST HOSP IP/OBS HIGH 75: CPT | Performed by: PSYCHIATRY & NEUROLOGY

## 2018-08-27 PROCEDURE — 77010033678 HC OXYGEN DAILY

## 2018-08-27 PROCEDURE — 84484 ASSAY OF TROPONIN QUANT: CPT

## 2018-08-27 PROCEDURE — 74011250637 HC RX REV CODE- 250/637: Performed by: FAMILY MEDICINE

## 2018-08-27 PROCEDURE — 74011250636 HC RX REV CODE- 250/636: Performed by: HOSPITALIST

## 2018-08-27 PROCEDURE — 93306 TTE W/DOPPLER COMPLETE: CPT

## 2018-08-27 PROCEDURE — 77030020263 HC SOL INJ SOD CL0.9% LFCR 1000ML

## 2018-08-27 PROCEDURE — 85025 COMPLETE CBC W/AUTO DIFF WBC: CPT

## 2018-08-27 PROCEDURE — 74011250636 HC RX REV CODE- 250/636: Performed by: FAMILY MEDICINE

## 2018-08-27 PROCEDURE — 74011250636 HC RX REV CODE- 250/636: Performed by: INTERNAL MEDICINE

## 2018-08-27 RX ORDER — MELATONIN
2000 DAILY
Status: DISCONTINUED | OUTPATIENT
Start: 2018-08-28 | End: 2018-08-27 | Stop reason: HOSPADM

## 2018-08-27 RX ORDER — SODIUM CHLORIDE 9 MG/ML
100 INJECTION, SOLUTION INTRAVENOUS CONTINUOUS
Status: DISCONTINUED | OUTPATIENT
Start: 2018-08-27 | End: 2018-08-27 | Stop reason: HOSPADM

## 2018-08-27 RX ORDER — HEPARIN SODIUM 5000 [USP'U]/ML
5000 INJECTION, SOLUTION INTRAVENOUS; SUBCUTANEOUS EVERY 8 HOURS
Status: DISCONTINUED | OUTPATIENT
Start: 2018-08-27 | End: 2018-08-27 | Stop reason: HOSPADM

## 2018-08-27 RX ORDER — MORPHINE SULFATE 2 MG/ML
1 INJECTION, SOLUTION INTRAMUSCULAR; INTRAVENOUS
Status: DISCONTINUED | OUTPATIENT
Start: 2018-08-27 | End: 2018-08-27 | Stop reason: HOSPADM

## 2018-08-27 RX ADMIN — MORPHINE SULFATE 1 MG: 2 INJECTION, SOLUTION INTRAMUSCULAR; INTRAVENOUS at 13:47

## 2018-08-27 RX ADMIN — LORAZEPAM 1 MG: 2 INJECTION INTRAMUSCULAR; INTRAVENOUS at 02:35

## 2018-08-27 RX ADMIN — HYDROCODONE BITARTRATE AND ACETAMINOPHEN 1 TABLET: 5; 325 TABLET ORAL at 07:44

## 2018-08-27 RX ADMIN — SUMATRIPTAN SUCCINATE 50 MG: 50 TABLET ORAL at 01:48

## 2018-08-27 RX ADMIN — SUMATRIPTAN SUCCINATE 50 MG: 50 TABLET ORAL at 10:10

## 2018-08-27 RX ADMIN — SODIUM CHLORIDE 100 ML/HR: 900 INJECTION, SOLUTION INTRAVENOUS at 11:26

## 2018-08-27 RX ADMIN — SODIUM CHLORIDE 100 ML/HR: 900 INJECTION, SOLUTION INTRAVENOUS at 01:19

## 2018-08-27 RX ADMIN — POTASSIUM CHLORIDE 20 MEQ: 200 INJECTION, SOLUTION INTRAVENOUS at 03:55

## 2018-08-27 RX ADMIN — Medication 10 ML: at 13:48

## 2018-08-27 RX ADMIN — Medication 10 ML: at 05:14

## 2018-08-27 RX ADMIN — LEVETIRACETAM 500 MG: 100 INJECTION, SOLUTION INTRAVENOUS at 05:14

## 2018-08-27 RX ADMIN — CEFTRIAXONE SODIUM 1 G: 1 INJECTION, POWDER, FOR SOLUTION INTRAMUSCULAR; INTRAVENOUS at 15:32

## 2018-08-27 NOTE — PROGRESS NOTES
Pt seizing. HR 150s. Pt gasping as she is coming out of seizure activity. Pt face reddish/purple. O2 sats 90s. PRN ativan given. Pt talking shortly after event. Pt confused on events. Unaware this time that she had a seizure. Seizure lasted about 10-15 seconds.

## 2018-08-27 NOTE — PROGRESS NOTES
MD asking for bed placement at [address] Number given. Packet for transport started and primary RN, Annie Mares knows to do [facility]. Pt on will call with Vipin Gutierrez [phone]. Films from radiology already pushed over by Rebeka Bowser in radiology. Call to EEG to see if can place EEG on disc for transfer.

## 2018-08-27 NOTE — PROGRESS NOTES
Occupational Therapy Note:    Therapist is discharging patient from OT at this time due to decline in medical status and transfer to ICU. Please reconsult OT when MD deems patient appropriate for continued services.      Thank you,    Matthew Meyer, OTR/L

## 2018-08-27 NOTE — PROGRESS NOTES
Patient being transferred to [facility] for 24 hour EEG monitoring. Accepting Physician is Dr. Candis Lacy, Neurologist. Patient going to room 21 . TRANSFER - OUT REPORT:    Verbal report given to [employer], RN (name) on Guy Corrales  being transferred to [facility], Room 3711 (unit) for ordered procedure -24 Hour EEG. Report consisted of patients Situation, Background, Assessment and   Recommendations(SBAR). Information from the following report(s) SBAR, Kardex, ED Summary, Procedure Summary, Intake/Output, MAR, Recent Results, Med Rec Status and Cardiac Rhythm Sinus Tach was reviewed with the receiving nurse. Lines:   Peripheral IV 08/26/18 Left Arm (Active)   Site Assessment Clean, dry, & intact 8/27/2018  3:30 PM   Phlebitis Assessment 0 8/27/2018  3:30 PM   Infiltration Assessment 0 8/27/2018  3:30 PM   Dressing Status Clean, dry, & intact 8/27/2018  3:30 PM   Dressing Type Tape;Transparent 8/27/2018  3:30 PM   Hub Color/Line Status Infusing 8/27/2018  3:30 PM   Alcohol Cap Used No 8/27/2018  3:30 PM       Peripheral IV 08/27/18 Left Wrist (Active)   Site Assessment Clean, dry, & intact 8/27/2018  3:30 PM   Phlebitis Assessment 0 8/27/2018  3:30 PM   Infiltration Assessment 0 8/27/2018  3:30 PM   Dressing Status Clean, dry, & intact 8/27/2018  3:30 PM   Dressing Type Tape;Transparent 8/27/2018  3:30 PM   Hub Color/Line Status Capped;Flushed 8/27/2018  3:30 PM   Alcohol Cap Used No 8/27/2018  3:30 PM        Opportunity for questions and clarification was provided.       Patient transported with:  SILAS Sullivan

## 2018-08-27 NOTE — PROGRESS NOTES
Patient with 15 second seizure like activity, HR up to 130s, BP stable. Full body rigidity, back and neck arching back, per  episode was \"less intense this time. \" No post ictal phase or bowel/bladder incontinence.  believes the Morphine 1 mg IVP given at [phone] helped with the intensity.

## 2018-08-27 NOTE — PROGRESS NOTES
Pt c/o of HA. Pt suddenly began seizing. HR 150s. O2 sat 98% on 2LNC. Seizure activity lasted about 15 seconds. No ativan given at this time. Pt post-ictal. Speech difficult, pt dazed. Pt able to state name, in a hospital, and year is 2017. After post-ictal period, pt given medication for HA. Pt having difficulty coordinating eating/giving medications. Pt now resting quietly in bed.  at bedside.

## 2018-08-27 NOTE — PROGRESS NOTES
Patient with approximately 20 second seizure-like activity, aura present, per  the jerking starts in hands, eyes roll back into her head, then full body twitching/jerking starts. Patient back arch and rigid, mouth open, gasping for air. No drop in SpO2, HR increased 140-180's, BP stable. Patient currently post ictal. No medications given, seizure ceased spontaneously. Hospitalist at bedside right after seizure completion. EEG for today as well as neuro to see patient.

## 2018-08-27 NOTE — PROGRESS NOTES
Patient with 15 second seizure like activity, HR up to 156, BP stable. Full body rigidity, back and neck arching back, turns very red. No post ictal phase or bowel/bladder incontinence. Patient with c/o chest pain, no changes on EKG, Dr. Pau Melo paged. No changes on EKG, all troponin's trended have been negative. Per Dr. Jack Teran this AM do not give ativan with these events. Awaiting EEG to be read. Dr Pau Melo returned call, notified of above, cardiac work-up has been negative and pain is believed to be musculoskeletal, new order received for Morphine 1 mg IVP every 4 hours PRN severe pain/chest pain.

## 2018-08-27 NOTE — PROGRESS NOTES
SPEECH PATHOLOGY NOTE:  Therapist is discharging patient from speech therapy at this time due to decline in medical status and transfer to ICU. Please reconsult SLP when MD deems patient appropriate for continued services and prior to upgrading PO diet.   Leonel Owens MA, CCC-SLP

## 2018-08-27 NOTE — PROGRESS NOTES
Bedside shift change report given to Violeta Banerjee (oncoming nurse) by Andrea Matos RN (offgoing nurse). Report included the following information SBAR, Kardex, ED Summary, Intake/Output, MAR, Recent Results, Med Rec Status, Cardiac Rhythm Sinus Tach and Alarm Parameters . Dual neuro assessment completed at shift change. Pt is drowsy, oriented x 4, periodic confusion per Andrea Matos RN, speech is slurred at times and delayed. Right facial droop noted. PERRLA, 3 mm. Weakness to both sides, right greater than left, drift to right arm, right leg with no effort against gravity, numbness and tingling all over, worse \"pins and needles\" to right side. No change per Andrea Matos RN.

## 2018-08-27 NOTE — DISCHARGE SUMMARY
Hospitalist Discharge Summary     Admit Date:  2018  1:37 PM   Name:  Adelaida Jennings   Age:  41 y.o.  :  1970   MRN:  [id number]   PCP:  PROVIDER UNKNOWN  Treatment Team: Attending Provider: Tonie Mcardle, MD; Consulting Provider: Tonie Mcardle, MD; Consulting Provider: Yee De Los Santos; Consulting Provider: Eusebia Yanez DO; Speech Language Pathologist: Danny Villarreal SLP    Problem List for this Hospitalization:  Hospital Problems as of 2018  Never Reviewed          Codes Class Noted - Resolved POA    * (Principal)Multiple sclerosis exacerbation ([facility] ICD-10-CM: G35  ICD-9-CM: 340  2014 - Present Yes        MS (multiple sclerosis) ([facility] ICD-10-CM: G35  ICD-9-CM: 104  2012 - Present Yes    Asthma, seizure like activity            Admission HPI from 2018:    Pt is a 40 yo female with known MS, last flare , no current neurologist due to [employer] retiring. She reports that her right eye vision became blurry about 1.5 weeks ago, she was seen by her eye doc with a dilated eye exam and her opthomalogist felt this was likely due to her optic nerve. A few days later she began having \"pins and needles\" sensation to her right side, starting in her face and now all the way down to her toes. Over the past 48 hours she has developed right sided weakness, now hardly able to lift extremities. She reports some urinary incontinence x 1 month. No bowel incontinence. Pt was given 1 gm Solumedrol in ER. Noted pt is not on any MS meds but has been doing \"diet control\". Vital signs stable. Pt will be admitted for IV steroids, inpatient status.      Hospital Course:  Pt was continued on IV steroids--Pt had a tele-neuro consult  with Dr. Jeana Payton.  Pt has had difficulty sleeping 2/2 to steroid therapy.  Pt's blood sugar was elevated at 214 .  MRIs did not show any new lesions however pt does have bilateral pleural effusions with bibasilar atelectasis.  Pt had an episode last night when her right arm and hand started shaking, then her body was shaking.      During stay developed chest pain- prob musculoskeletal- ekg normal.Troponin normal. Echo ordered. prob uti- rocephin. Hypokalemia resolved. Pt started having seizure like activity since 8/26/18- had got prn ativan. Pt was moved to ICU evening of 8/26/17- according to the documentation/staf  Pt had few more episodes of seizure activity.  in house neurology was consulted today- EEG was ordered- eeg didn't show any seizure like activity. Pt was still having these episodes of seizure like activity- neurologist recommended transfer to [facility]. I spoke to Rajinder Biggs- Neurologist at [facility]- who graciously accepted pt for transfer. The same was conveyed to pt- pt didn't have any questions unanswered. Pt says she was diagnosed with Multiple sclerosis in 2002- had been on medications for MS upto 2006. Since her Neurologist retired in 2007 , had not been on any medications for MS. Follow up instructions below. Plan was discussed with patient. All questions answered. Patient was stable at time of discharge and was instructed to call or return if there are any concerns or recurrence of symptoms. Diagnostic Imaging/Tests:   Mri Brain W Wo Cont    Result Date: 8/25/2018  History: MS exacerbation with acute onset right-sided weakness EXAM: MRI brain with and without contrast TECHNIQUE: Multiplanar multisequence imaging is performed both before and after the uneventful administration of 12 cc Dotarem COMPARISON: 7/17/2014 FINDINGS: There are foci of T2 signal prolongation seen in the corona radiata and centrum semiovale. These are slightly more prominent than seen previously, particularly on the left within the corona radiata. No abnormal parenchymal or meningeal enhancement demonstrated. The ventricles are normal in size, shape, and configuration. There is no extra-axial fluid collection, mass, or mass effect.  There is no midline shift. The basilar cisterns are patent. Diffusion-weighted imaging demonstrates no restricted diffusion. Gradient echo sequence imaging demonstrates no blooming artifact to suggest retained intracranial blood products. IMPRESSION: Nonspecific areas of T2 signal prolongation again seen within the corona radiata and centrum semiovale bilaterally. These are slightly more prominent on the left when compared with the prior exam. Plaques from multiple sclerosis are certainly a diagnostic possibility, particularly given the patient's clinical history, amongst other possible etiologies. No additional interval change. Mri Cerv Spine W Wo Cont    Result Date: 8/25/2018  History: New onset right-sided weakness. Known multiple sclerosis. EXAM: MRI cervical spine with and without contrast TECHNIQUE: Multiplanar multisequence imaging is performed both before and after the uneventful ministration of 12 cc Dotarem COMPARISON: 7/18/2014 FINDINGS: Degenerative disc signal noted C2-3 through C5-6 with disc osteophyte complex formation at multiple levels. There is normal signal within the cervical spinal cord throughout. Limited evaluation of the posterior fossa, clivus, and craniocervical junction is normal. C2-3: Normal C3-4: There is a shallow disc osteophyte complex at this level without spinal stenosis or neural foraminal narrowing. C4-5: There is a broad-based disc osteophyte complex at this level with mild spinal stenosis and moderate right neural foraminal narrowing. C5-6: There is a shallow disc osteophyte complex with facet arthropathy. There is mild spinal stenosis and mild bilateral neural foraminal narrowing. C6-7: No spinal canal or neural foraminal narrowing. C7-T1: No spinal canal or neural foraminal narrowing. IMPRESSION: 1. No evidence of multiple sclerosis involving the cervical spinal cord. 2. Multilevel cervical spondylosis. There is new moderate right neural foraminal narrowing at C4-5.  There is also mild spinal stenosis at C4-5 and C5-6. At C5-6, there is mild bilateral neural foraminal narrowing. Mri Thorac Spine W Wo Cont    Result Date: 8/25/2018  History: Multiple sclerosis exacerbation. New onset right-sided weakness. EXAM: MRI thoracic spine with and without contrast TECHNIQUE: Multiplanar multisequence imaging is performed both before and after the uneventful administration of 12 cc Dotarem No comparison FINDINGS: There is degenerative disc signal present at nearly all levels of the thoracic spine with varying degrees of disc height loss. Mild subarticular reactive endplate change present within the mid thoracic spine. However, there is normal signal within the thoracic spinal cord throughout. Axial imaging demonstrates no additional abnormality. There is no spinal stenosis or neural foraminal narrowing. Postcontrast imaging demonstrates no abnormal enhancement within the thoracic spine. IMPRESSION: 1. No evidence of multiple sclerosis within the thoracic spine. 2. Degenerative disc disease of the thoracic spine without spinal stenosis or neural foraminal narrowing. Ct Head Wo Cont    Result Date: 8/24/2018  Noncontrast head CT Clinical Indication: Slurred speech, right leg numbness, facial numbness, difficulty walking for one-2 weeks. Right M new weakness. History of multiple sclerosis. Technique: Noncontrast axial images were obtained through the brain. All CT scans at this location are performed using dose modulation techniques as appropriate including the following: Automated exposure control, adjustment of the MA and/or kV according to patient's size, or use of iterative reconstruction technique. Comparison: Brain MRI 7/17/2014 Findings: There is no acute intracranial hemorrhage, hydrocephalus, intra-axial mass, or mass-effect. There is no CT evidence of acute large artery territorial infarction or abnormal extra-axial fluid collection.  The mastoid air cells and paranasal sinuses are clear where imaged. No displaced skull fractures are present. Impression: No acute intracranial abnormality. Echocardiogram results:  No results found for this visit on 08/24/18. All Micro Results     Procedure Component Value Units Date/Time    CULTURE, URINE [[id number]] Collected:  08/25/18 1939    Order Status:  Completed Specimen:  Urine from Clean catch Updated:  08/27/18 0729     Special Requests: NO SPECIAL REQUESTS        Culture result:         NO GROWTH AFTER SHORT PERIOD OF INCUBATION. FURTHER RESULTS TO FOLLOW AFTER OVERNIGHT INCUBATION. Labs: Results:       BMP, Mg, Phos Recent Labs      08/27/18   0328  08/26/18   1744  08/25/18   0504   NA  143  145  140   K  3.8  3.2*  3.8   CL  106  109*  107   CO2  26  24  23   AGAP  11  12  10   BUN  9  10  11   CREA  0.64  0.64  0.56*   CA  7.5*  7.9*  8.7   GLU  140*  99  214*   MG   --   1.8   --    PHOS   --   2.6   --       CBC Recent Labs      08/27/18 0328 08/25/18   0504   WBC  9.9  9.7   RBC  4.02*  4.33   HGB  12.0  12.9   HCT  36.8  39.1   PLT  286  326   GRANS  92*   --    LYMPH  7*   --    EOS  0*   --    MONOS  1*   --    BASOS  0   --    IG  1   --    ANEU  9.1*   --    ABL  0.7   --    JOHANA  0.0   --    ABM  0.1   --    ABB  0.0   --    AIG  0.1   --       LFT No results for input(s): SGOT, ALT, TBIL, AP, TP, ALB, GLOB, AGRAT, GPT in the last 72 hours.    Cardiac Testing Lab Results   Component Value Date/Time    Troponin-I, Qt. <0.02 (L) 08/27/2018 03:28 AM    Troponin-I, Qt. <0.02 (L) 08/26/2018 10:53 PM    Troponin-I, Qt. <0.02 (L) 08/26/2018 05:22 PM      Coagulation Tests No results found for: PTP, INR, APTT   A1c No results found for: HBA1C, HGBE8, LKP4IRQH   Lipid Panel No results found for: CHOL, CHOLPOCT, CHOLX, CHLST, CHOLV, 689922, HDL, LDL, LDLC, DLDLP, 189014, VLDLC, VLDL, TGLX, TRIGL, TRIGP, TGLPOCT, CHHD, CHHDX   Thyroid Panel Lab Results   Component Value Date/Time    TSH 0.339 (L) 08/25/2018 10:29 AM Most Recent UA Lab Results   Component Value Date/Time    Color YELLOW 08/25/2018 07:39 PM    Appearance CLEAR 08/25/2018 07:39 PM    Specific gravity 1.026 (H) 08/25/2018 07:39 PM    pH (UA) 6.0 08/25/2018 07:39 PM    Protein TRACE (A) 08/25/2018 07:39 PM    Glucose NEGATIVE  08/25/2018 07:39 PM    Ketone NEGATIVE  08/25/2018 07:39 PM    Bilirubin NEGATIVE  08/25/2018 07:39 PM    Blood TRACE (A) 08/25/2018 07:39 PM    Urobilinogen 0.2 08/25/2018 07:39 PM    Nitrites NEGATIVE  08/25/2018 07:39 PM    Leukocyte Esterase SMALL (A) 08/25/2018 07:39 PM        Allergies   Allergen Reactions    Meperidine Rash and Nausea and Vomiting    Vioxx [Rofecoxib] Hives     Immunization History   Administered Date(s) Administered    Influenza Vaccine Whole 02/14/2006    Pneumococcal Polysaccharide (PPSV-23) 07/20/2014       All Labs from Last 24 Hrs:  Recent Results (from the past 24 hour(s))   EKG, 12 LEAD, SUBSEQUENT    Collection Time: 08/26/18  4:33 PM   Result Value Ref Range    Ventricular Rate 115 BPM    Atrial Rate 115 BPM    P-R Interval 132 ms    QRS Duration 82 ms    Q-T Interval 318 ms    QTC Calculation (Bezet) 439 ms    Calculated P Axis 46 degrees    Calculated R Axis 43 degrees    Calculated T Axis 61 degrees    Diagnosis       Sinus tachycardia  Otherwise normal ECG  When compared with ECG of 25-AUG-2018 20:45,  No significant change was found  Confirmed by LEVY SHELDON (), Demetri Spence ([id number]) on 8/27/2018 7:30:27 AM     GLUCOSE, POC    Collection Time: 08/26/18  5:20 PM   Result Value Ref Range    Glucose (POC) 106 (H) 65 - 100 mg/dL   TROPONIN I    Collection Time: 08/26/18  5:22 PM   Result Value Ref Range    Troponin-I, Qt. <0.02 (L) 0.02 - 9.41 NG/ML   METABOLIC PANEL, BASIC    Collection Time: 08/26/18  5:44 PM   Result Value Ref Range    Sodium 145 136 - 145 mmol/L    Potassium 3.2 (L) 3.5 - 5.1 mmol/L    Chloride 109 (H) 98 - 107 mmol/L    CO2 24 21 - 32 mmol/L    Anion gap 12 7 - 16 mmol/L    Glucose 99 65 - 100 mg/dL    BUN 10 6 - 23 MG/DL    Creatinine 0.64 0.6 - 1.0 MG/DL    GFR est AA >60 >60 ml/min/1.73m2    GFR est non-AA >60 >60 ml/min/1.73m2    Calcium 7.9 (L) 8.3 - 10.4 MG/DL   MAGNESIUM    Collection Time: 08/26/18  5:44 PM   Result Value Ref Range    Magnesium 1.8 1.8 - 2.4 mg/dL   PHOSPHORUS    Collection Time: 08/26/18  5:44 PM   Result Value Ref Range    Phosphorus 2.6 2.5 - 4.5 MG/DL   BLOOD GAS & LYTES, ARTERIAL    Collection Time: 08/26/18  5:59 PM   Result Value Ref Range    pH 7.44 7.35 - 7.45      PCO2 36 35 - 45 mmHg    PO2 108 (H) 80 - 105 mmHg    BICARBONATE 24 22 - 26 mmol/L    BASE EXCESS 0.2 0 - 3 mmol/L    TOTAL HEMOGLOBIN 12.7 11.7 - 15.0 GM/DL    O2 SAT 98 92 - 98.5 %    Arterial O2 Hgb 97.4 (H) 94 - 97 %    CARBOXYHEMOGLOBIN 0.0 (L) 0.5 - 1.5 %    METHEMOGLOBIN 0.7 0.0 - 1.5 %    DEOXYHEMOGLOBIN 2 0.0 - 5.0 %    Sodium 140.8 135 - 148 MMOL/L    POTASSIUM 3.29 (L) 3.5 - 5.3 MMOL/L    CHLORIDE 107 (H) 98 - 106      Calcium, ionized 1.13 1.0 - 1.3 mmol/L    SITE RR     ALLENS TEST POSITIVE      MODE NC     O2 FLOW 2.00 L/min   GLUCOSE, POC    Collection Time: 08/26/18  9:22 PM   Result Value Ref Range    Glucose (POC) 126 (H) 65 - 100 mg/dL   TROPONIN I    Collection Time: 08/26/18 10:53 PM   Result Value Ref Range    Troponin-I, Qt. <0.02 (L) 0.02 - 0.05 NG/ML   CBC WITH AUTOMATED DIFF    Collection Time: 08/27/18  3:28 AM   Result Value Ref Range    WBC 9.9 4.3 - 11.1 K/uL    RBC 4.02 (L) 4.05 - 5.2 M/uL    HGB 12.0 11.7 - 15.4 g/dL    HCT 36.8 35.8 - 46.3 %    MCV 91.5 79.6 - 97.8 FL    MCH 29.9 26.1 - 32.9 PG    MCHC 32.6 31.4 - 35.0 g/dL    RDW 12.6 %    PLATELET 254 725 - 421 K/uL    MPV 10.1 9.4 - 12.3 FL    ABSOLUTE NRBC 0.00 0.0 - 0.2 K/uL    DF AUTOMATED      NEUTROPHILS 92 (H) 43 - 78 %    LYMPHOCYTES 7 (L) 13 - 44 %    MONOCYTES 1 (L) 4.0 - 12.0 %    EOSINOPHILS 0 (L) 0.5 - 7.8 %    BASOPHILS 0 0.0 - 2.0 %    IMMATURE GRANULOCYTES 1 0.0 - 5.0 %    ABS.  NEUTROPHILS 9.1 (H) 1.7 - 8.2 K/UL    ABS. LYMPHOCYTES 0.7 0.5 - 4.6 K/UL    ABS. MONOCYTES 0.1 0.1 - 1.3 K/UL    ABS. EOSINOPHILS 0.0 0.0 - 0.8 K/UL    ABS. BASOPHILS 0.0 0.0 - 0.2 K/UL    ABS. IMM.  GRANS. 0.1 0.0 - 0.5 K/UL   METABOLIC PANEL, BASIC    Collection Time: 08/27/18  3:28 AM   Result Value Ref Range    Sodium 143 136 - 145 mmol/L    Potassium 3.8 3.5 - 5.1 mmol/L    Chloride 106 98 - 107 mmol/L    CO2 26 21 - 32 mmol/L    Anion gap 11 7 - 16 mmol/L    Glucose 140 (H) 65 - 100 mg/dL    BUN 9 6 - 23 MG/DL    Creatinine 0.64 0.6 - 1.0 MG/DL    GFR est AA >60 >60 ml/min/1.73m2    GFR est non-AA >60 >60 ml/min/1.73m2    Calcium 7.5 (L) 8.3 - 10.4 MG/DL   TROPONIN I    Collection Time: 08/27/18  3:28 AM   Result Value Ref Range    Troponin-I, Qt. <0.02 (L) 0.02 - 0.05 NG/ML   GLUCOSE, POC    Collection Time: 08/27/18  7:35 AM   Result Value Ref Range    Glucose (POC) 109 (H) 65 - 100 mg/dL   GLUCOSE, POC    Collection Time: 08/27/18 11:08 AM   Result Value Ref Range    Glucose (POC) 128 (H) 65 - 100 mg/dL       Discharge Exam:  Patient Vitals for the past 24 hrs:   Temp Pulse Resp BP SpO2   08/27/18 1559 - (!) 102 18 123/79 96 %   08/27/18 1530 98.5 °F (36.9 °C) (!) 108 27 114/71 95 %   08/27/18 1500 - (!) 112 19 122/73 94 %   08/27/18 1429 - (!) 116 15 115/68 94 %   08/27/18 1359 - (!) 103 21 105/60 95 %   08/27/18 1330 - (!) 103 18 123/65 95 %   08/27/18 1300 - (!) 101 17 110/63 95 %   08/27/18 1229 - (!) 102 17 120/70 95 %   08/27/18 1214 - (!) 112 (!) 44 - 96 %   08/27/18 1213 - (!) 149 (!) 55 - 95 %   08/27/18 1212 - (!) 108 20 - 95 %   08/27/18 1159 - (!) 112 19 122/70 95 %   08/27/18 1130 98.7 °F (37.1 °C) (!) 102 17 118/73 95 %   08/27/18 1100 - 98 18 125/83 96 %   08/27/18 1030 - (!) 106 12 135/82 96 %   08/27/18 0959 - (!) 112 14 119/76 97 %   08/27/18 0930 - (!) 118 10 129/80 94 %   08/27/18 0859 - (!) 119 14 118/71 94 %   08/27/18 0830 - (!) 119 23 148/79 96 %   08/27/18 0829 - (!) 125 (!) 33 - 94 %   08/27/18 0828 - (!) 165 (!) 76 - 94 %   08/27/18 0827 - (!) 122 19 - 95 %   08/27/18 0826 - (!) 124 22 - 95 %   08/27/18 0806 - (!) 105 23 135/75 95 %   08/27/18 0804 - (!) 140 22 - 93 %   08/27/18 0803 - (!) 180 (!) 54 - 95 %   08/27/18 0802 - (!) 119 20 - 95 %   08/27/18 0800 - (!) 112 16 124/62 95 %   08/27/18 0730 99.1 °F (37.3 °C) 94 16 118/72 97 %   08/27/18 0659 - (!) 117 17 117/77 98 %   08/27/18 0629 - (!) 107 18 110/75 98 %   08/27/18 0600 - 91 16 116/74 99 %   08/27/18 0530 - 95 16 121/70 98 %   08/27/18 0514 - 97 17 117/68 97 %   08/27/18 0459 - 95 16 128/78 98 %   08/27/18 0444 - 97 15 124/80 98 %   08/27/18 0429 - 100 17 113/72 97 %   08/27/18 0415 - 99 18 113/74 97 %   08/27/18 0400 - 96 18 113/76 98 %   08/27/18 0345 - 90 17 124/81 98 %   08/27/18 0330 - (!) 110 22 122/79 95 %   08/27/18 0314 98.1 °F (36.7 °C) (!) 106 18 (!) 137/95 99 %   08/27/18 0259 - 92 16 133/86 99 %   08/27/18 0244 - 95 18 125/81 98 %   08/27/18 0230 - (!) 113 (!) 31 (!) 146/99 98 %   08/27/18 0215 - 92 16 124/83 98 %   08/27/18 0200 - (!) 101 15 126/82 97 %   08/27/18 0145 - (!) 110 23 122/81 95 %   08/27/18 0129 - (!) 114 (!) 31 131/82 98 %   08/27/18 0127 - (!) 148 (!) 32 - 96 %   08/27/18 0114 - 93 16 119/80 99 %   08/27/18 0059 - 95 17 113/75 99 %   08/27/18 0044 - 93 17 118/73 99 %   08/27/18 0030 - 95 17 114/71 99 %   08/27/18 0015 - 94 17 119/73 99 %   08/26/18 2345 - 93 16 121/73 98 %   08/26/18 2329 98.7 °F (37.1 °C) 95 17 116/72 98 %   08/26/18 2314 - 93 17 116/73 98 %   08/26/18 2259 - 92 16 127/76 99 %   08/26/18 2245 - 96 17 115/76 98 %   08/26/18 2230 - 94 16 119/77 98 %   08/26/18 2215 - 92 17 114/77 98 %   08/26/18 2200 - 92 17 116/78 98 %   08/26/18 2144 - 100 18 128/81 98 %   08/26/18 2129 - (!) 127 (!) 93 (!) 162/118 95 %   08/26/18 2114 - 94 18 127/75 99 %   08/26/18 2100 - 100 17 131/72 98 %   08/26/18 2045 - 98 17 121/78 98 %   08/26/18 2030 - 98 17 122/78 98 %   08/26/18 2015 - 94 18 120/78 99 %   08/26/18 1959 - (!) 106 19 132/85 99 %   08/26/18 1944 - (!) 104 17 120/78 94 %   08/26/18 1929 99.1 °F (37.3 °C) 89 25 (!) 146/91 98 %   08/26/18 1914 - 89 17 (!) 147/91 99 %   08/26/18 1900 - 88 26 139/85 98 %   08/26/18 1815 - (!) 101 19 132/80 98 %   08/26/18 1812 - (!) 102 18 - 96 %   08/26/18 1759 - (!) 127 17 136/86 96 %   08/26/18 1744 - (!) 131 (!) 36 135/85 97 %   08/26/18 1729 - (!) 114 (!) 51 129/80 97 %   08/26/18 1645 - (!) 112 23 134/83 94 %   08/26/18 1625 - (!) 113 - 131/74 -   08/26/18 1615 98.2 °F (36.8 °C) (!) 108 - 148/80 99 %     Oxygen Therapy  O2 Sat (%): 96 % (08/27/18 1559)  Pulse via Oximetry: 104 beats per minute (08/27/18 1559)  O2 Device: Room air (08/27/18 1530)  O2 Flow Rate (L/min): 0 l/min (08/27/18 1530)    Intake/Output Summary (Last 24 hours) at 08/27/18 1607  Last data filed at 08/27/18 1138   Gross per 24 hour   Intake          2141.67 ml   Output             2900 ml   Net          -758.33 ml       General:    Well nourished. Alert. No distress. no speech problems  Eyes:   Normal sclera. Extraocular movements intact. ENT:  Normocephalic, atraumatic. Moist mucous membranes  CV:   Regular rate and rhythm. Mild tachy  No murmur, rub, or gallop. Lungs:  Mild basilar crepitations  Abdomen: Soft, nontender, nondistended. Bowel sounds normal.   Extremities: Warm and dry. No cyanosis or edema. Neurologic: Decreased sensation rt face and rt side of the body. Reflexes. Decreased mobility all extremities- prob give away weakness. Plantars down going. Skin:     No rashes or jaundice. Psych:  Normal mood and affect. Discharge Info:   Current Discharge Medication List      STOP taking these medications       INTRAUTERINE DEVICE, IUD, IU Comments:   Reason for Stopping:         Diphenhydramine-Acetaminophen (PERCOGESIC) 12.5-325 mg tab Comments:   Reason for Stopping:         COPAXONE SC Comments:   Reason for Stopping:                 Disposition: [facility] facility.    Activity: bedrest  Diet: Regular    Follow-up Appointments   Procedures    FOLLOW UP VISIT Appointment in: Other (Specify) Pt is being transferred to [facility] Under the care of Corine Obregon. Pt was informed about the transfer- no questions unanswered. Pt is being transferred to [facility] Under the care of Corine Obregon. Pt was informed about the transfer- no questions unanswered. Standing Status:   Standing     Number of Occurrences:   1     Order Specific Question:   Appointment in     Answer: Other (Specify)         Follow-up Information     Follow up With Details Comments Contact Info    Provider Unknown   Patient not available to ask            Time spent in patient discharge planning and coordination 35 minutes.     Signed:  Salima Chow MD

## 2018-08-27 NOTE — PROGRESS NOTES
Shift assessment complete, chart reviewed. Pt is drowsy, oriented x 4, periodic confusion of hospital events over the past couple of days, speech is slurred at times and delayed. Right facial droop noted. PERRLA, 3 mm. Weakness to both sides, right greater than left, drift to right arm, right leg with no effort against gravity, numbness and tingling all over, worse \"pins and needles\" to right side. PERRLA, 4 mm. Sinus tach on monitor, HR 's at rest, BP stable. Breath sounds are clear throughout, NC at 2 L, SpO2 98%, NC removed and placed on room air, SpO2 now 96%. When patient asked to take a deep breath immediate coughing noted, pt encouraged to cough and deep breath more. Abdomen is soft, intact, bowel sounds active. Voids to bedpan, urine is yellow and clear. Pulses palpable and regular. C/o pain 7/10 to anterior head, starts on left and moves to right, PRN [place] to be given. VSS. NAD at this time.  at bedside.

## 2018-08-27 NOTE — PROGRESS NOTES
Bedside report from TAMAR Neri. Pt sleeping. VSS. Pt alert and oriented. Speech slurred. Weakness in all extremities. Pt stated she could not  hands. Pt rolled over in bed and removed NC from nose. Pt with purposeful movement in all extremities. Pupils reactive and dilated. Pt with delayed responses. Tongue midline. Pt c/o of HA. Pt denies nausea, SOB. Pt c/o of tingling in entire body. Family at bedside. Breath sounds clear. S1S2. Active bowel sounds. Abd soft, intact. Skin warm, dry, intact. No allevyn. Pulses palpable in all extremities.  2L NC.

## 2018-08-27 NOTE — PROGRESS NOTES
Patient with subsequent seizure-like activity last approximately 25 seconds, no post ictal phase post activity.  said aura present again prior to seizure like activity. Head turned towards left side, full body rigidity and jerking, back and neck arch back, HR up to 140's, no drop in SpO2 and no incontinence of bowel/bladder. Dr. Greta Butler notified of stat neuro consultant, as well as 2 tele-neuro consults over the weekend and seizure-like activity throughout yesterday, last night, and 2 episodes today. Per Dr. Greta Butler, at hospital and will come assess patient. EEG changed from routine to stat per Dr. Greta Butler.  EEG tech notified and on her way to proceed with test.

## 2018-08-27 NOTE — PROGRESS NOTES
Patient transported via [facility] for transfer to [place] [id number]. All belongings sent with patient's . No neuro changes from previous neuro exams. NIH Stroke scale on discharge 15. VSS. NAD at time of discharge.

## 2018-08-27 NOTE — INTERDISCIPLINARY ROUNDS
Interdisciplinary team rounds were held 8/27/2018 with the following team members:Care Management, Nursing, Nurse Practitioner, Nutrition, Palliative Care, Pastoral Care, Pharmacy, Physical Therapy, Physician, Respiratory Therapy and Clinical Coordinator. Plan of care discussed. See clinical pathway and/or care plan for interventions and desired outcomes.

## 2018-08-27 NOTE — PROGRESS NOTES
Patient and  unhappy with care provided by this nurse. Statements made about needing more compassion and not giving commands to patient. Conversation had about how care and communication could be improved.  and patient upset that I continue to ask patient to hold own cup to drink, saying \"OK\" to much when speaking to the patient and am coming off condescending. Apologized that they are feeling this away and asked what could be done differently to make them more happy. Educated patient and  on the more the patient can do on her own the better outcomes she will have, even 24 hours of laying not doing anything can place rehabilitation in regression. Also that patient feels out of control of her body, anything she can do for herself will help with those feelings. Decision made that the  and patient does not want to be instructed on what to do, they just need a day for her not to have to do anything she doesn't want to do. Decision made that the  will feed and hold drink for patient. This nurse will comply with request and care for patient in which ever way makes them comfortable.

## 2018-08-27 NOTE — PROGRESS NOTES
Hospitalist Progress Note     Admit Date:  2018  1:37 PM   Name:  Frida Cesar   Age:  41 y.o.  :  1970   MRN:  [id number]   PCP:  PROVIDER UNKNOWN  Treatment Team: Attending Provider: Jason Barbosa MD; Consulting Provider: Jason Barbosa MD; Consulting Provider: Lacho Hendricks; Consulting Provider: [employer], DO; Speech Language Pathologist: JESUS Watts    Subjective:   As previously Documented  CC:  Blurred vision with pins and needles right side     Pt is a 42 yo female with PMH of MS with last flare . Pt presented with history of her right eye vision becoming blurry over the past week or so. She saw the ophthalmologist who dilated her eyes and felt that it was likely her optic nerve. A few days later she developed pins and needles on her right side from her face to her toes. 48 hrs prior to admit she had right sided weakness develop to the point where she was not able to even lift her arm or leg. Pt has had urinary incontinence over the past month but no loss of bowel control. In the ED pt received 1 gm of solumedrol. Pt has not been on any MS medications since she has been trying to manage the MS with diet.       Pt had a tele-neuro consult  with Dr. Artie Goodell. Pt has had difficulty sleeping 2/2 to steroid therapy. Pt's blood sugar was elevated at 214 . MRIs did not show any new lesions however pt does have bilateral pleural effusions with bibasilar atelectasis. Pt had an episode last night when her right arm and hand started shaking, then her body was shaking. Repeat MRIs neg. This am pt very emotional and tearful regarding her \"seizures\" - repeatedly saying \"I don't talk like this\". Offered reassurance.   Will get in house neuro consult in am.    Pt was transferred to ICU sec to recurrent/seizure like activity on 18  Pt had few more similar episodes since moving to ICU.    18  In morning rounds- tele tachy- went to pts room-  at bedside- says pt had seizure like acitvity-  I didn't notice any seizure activity- pt awake,alert- said feels funny- not post ictal.        Objective:   Patient Vitals for the past 24 hrs:   Temp Pulse Resp BP SpO2   08/27/18 1359 - (!) 103 21 105/60 95 %   08/27/18 1330 - (!) 103 18 123/65 95 %   08/27/18 1300 - (!) 101 17 110/63 95 %   08/27/18 1229 - (!) 102 17 120/70 95 %   08/27/18 1214 - (!) 112 (!) 44 - 96 %   08/27/18 1213 - (!) 149 (!) 55 - 95 %   08/27/18 1212 - (!) 108 20 - 95 %   08/27/18 1159 - (!) 112 19 122/70 95 %   08/27/18 1130 98.7 °F (37.1 °C) (!) 102 17 118/73 95 %   08/27/18 1100 - 98 18 125/83 96 %   08/27/18 1030 - (!) 106 12 135/82 96 %   08/27/18 0959 - (!) 112 14 119/76 97 %   08/27/18 0930 - (!) 118 10 129/80 94 %   08/27/18 0859 - (!) 119 14 118/71 94 %   08/27/18 0830 - (!) 119 23 148/79 96 %   08/27/18 0829 - (!) 125 (!) 33 - 94 %   08/27/18 0828 - (!) 165 (!) 76 - 94 %   08/27/18 0827 - (!) 122 19 - 95 %   08/27/18 0826 - (!) 124 22 - 95 %   08/27/18 0806 - (!) 105 23 135/75 95 %   08/27/18 0804 - (!) 140 22 - 93 %   08/27/18 0803 - (!) 180 (!) 54 - 95 %   08/27/18 0802 - (!) 119 20 - 95 %   08/27/18 0800 - (!) 112 16 124/62 95 %   08/27/18 0730 99.1 °F (37.3 °C) 94 16 118/72 97 %   08/27/18 0659 - (!) 117 17 117/77 98 %   08/27/18 0629 - (!) 107 18 110/75 98 %   08/27/18 0600 - 91 16 116/74 99 %   08/27/18 0530 - 95 16 121/70 98 %   08/27/18 0514 - 97 17 117/68 97 %   08/27/18 0459 - 95 16 128/78 98 %   08/27/18 0444 - 97 15 124/80 98 %   08/27/18 0429 - 100 17 113/72 97 %   08/27/18 0415 - 99 18 113/74 97 %   08/27/18 0400 - 96 18 113/76 98 %   08/27/18 0345 - 90 17 124/81 98 %   08/27/18 0330 - (!) 110 22 122/79 95 %   08/27/18 0314 98.1 °F (36.7 °C) (!) 106 18 (!) 137/95 99 %   08/27/18 0259 - 92 16 133/86 99 %   08/27/18 0244 - 95 18 125/81 98 %   08/27/18 0230 - (!) 113 (!) 31 (!) 146/99 98 %   08/27/18 0215 - 92 16 124/83 98 %   08/27/18 0200 - (!) 101 15 126/82 97 %   08/27/18 0145 - (!) 110 23 122/81 95 %   08/27/18 0129 - (!) 114 (!) 31 131/82 98 %   08/27/18 0127 - (!) 148 (!) 32 - 96 %   08/27/18 0114 - 93 16 119/80 99 %   08/27/18 0059 - 95 17 113/75 99 %   08/27/18 0044 - 93 17 118/73 99 %   08/27/18 0030 - 95 17 114/71 99 %   08/27/18 0015 - 94 17 119/73 99 %   08/26/18 2345 - 93 16 121/73 98 %   08/26/18 2329 98.7 °F (37.1 °C) 95 17 116/72 98 %   08/26/18 2314 - 93 17 116/73 98 %   08/26/18 2259 - 92 16 127/76 99 %   08/26/18 2245 - 96 17 115/76 98 %   08/26/18 2230 - 94 16 119/77 98 %   08/26/18 2215 - 92 17 114/77 98 %   08/26/18 2200 - 92 17 116/78 98 %   08/26/18 2144 - 100 18 128/81 98 %   08/26/18 2129 - (!) 127 (!) 93 (!) 162/118 95 %   08/26/18 2114 - 94 18 127/75 99 %   08/26/18 2100 - 100 17 131/72 98 %   08/26/18 2045 - 98 17 121/78 98 %   08/26/18 2030 - 98 17 122/78 98 %   08/26/18 2015 - 94 18 120/78 99 %   08/26/18 1959 - (!) 106 19 132/85 99 %   08/26/18 1944 - (!) 104 17 120/78 94 %   08/26/18 1929 99.1 °F (37.3 °C) 89 25 (!) 146/91 98 %   08/26/18 1914 - 89 17 (!) 147/91 99 %   08/26/18 1900 - 88 26 139/85 98 %   08/26/18 1815 - (!) 101 19 132/80 98 %   08/26/18 1812 - (!) 102 18 - 96 %   08/26/18 1759 - (!) 127 17 136/86 96 %   08/26/18 1744 - (!) 131 (!) 36 135/85 97 %   08/26/18 1729 - (!) 114 (!) 51 129/80 97 %   08/26/18 1645 - (!) 112 23 134/83 94 %   08/26/18 1625 - (!) 113 - 131/74 -   08/26/18 1615 98.2 °F (36.8 °C) (!) 108 - 148/80 99 %     Oxygen Therapy  O2 Sat (%): 95 % (08/27/18 1359)  Pulse via Oximetry: 106 beats per minute (08/27/18 1359)  O2 Device: Room air (08/27/18 1130)  O2 Flow Rate (L/min): 0 l/min (08/27/18 1130)    Intake/Output Summary (Last 24 hours) at 08/27/18 1504  Last data filed at 08/27/18 1138   Gross per 24 hour   Intake          2141.67 ml   Output             2900 ml   Net          -758.33 ml         General:    Well nourished. Alert. Not post ictal  HEENT- normal  CV:   tachycardia No murmur, rub, or gallop.   Lungs:   mild basilar crepitations  Abdomen:   Soft, nontender, nondistended. CNS- - moves very slowly upper and lower ext- not full range-?give away weakness. decreased sensation rt side of the body. Extremities: Warm and dry. No cyanosis or edema. Skin:     No rashes or jaundice. Data Review:  I have reviewed all labs.     Recent Results (from the past 24 hour(s))   GLUCOSE, POC    Collection Time: 08/26/18  3:57 PM   Result Value Ref Range    Glucose (POC) 136 (H) 65 - 100 mg/dL   EKG, 12 LEAD, SUBSEQUENT    Collection Time: 08/26/18  4:33 PM   Result Value Ref Range    Ventricular Rate 115 BPM    Atrial Rate 115 BPM    P-R Interval 132 ms    QRS Duration 82 ms    Q-T Interval 318 ms    QTC Calculation (Bezet) 439 ms    Calculated P Axis 46 degrees    Calculated R Axis 43 degrees    Calculated T Axis 61 degrees    Diagnosis       Sinus tachycardia  Otherwise normal ECG  When compared with ECG of 25-AUG-2018 20:45,  No significant change was found  Confirmed by LEVY SHELDON (), Demetri Spence ([id number]) on 8/27/2018 7:30:27 AM     GLUCOSE, POC    Collection Time: 08/26/18  5:20 PM   Result Value Ref Range    Glucose (POC) 106 (H) 65 - 100 mg/dL   TROPONIN I    Collection Time: 08/26/18  5:22 PM   Result Value Ref Range    Troponin-I, Qt. <0.02 (L) 0.02 - 3.31 NG/ML   METABOLIC PANEL, BASIC    Collection Time: 08/26/18  5:44 PM   Result Value Ref Range    Sodium 145 136 - 145 mmol/L    Potassium 3.2 (L) 3.5 - 5.1 mmol/L    Chloride 109 (H) 98 - 107 mmol/L    CO2 24 21 - 32 mmol/L    Anion gap 12 7 - 16 mmol/L    Glucose 99 65 - 100 mg/dL    BUN 10 6 - 23 MG/DL    Creatinine 0.64 0.6 - 1.0 MG/DL    GFR est AA >60 >60 ml/min/1.73m2    GFR est non-AA >60 >60 ml/min/1.73m2    Calcium 7.9 (L) 8.3 - 10.4 MG/DL   MAGNESIUM    Collection Time: 08/26/18  5:44 PM   Result Value Ref Range    Magnesium 1.8 1.8 - 2.4 mg/dL   PHOSPHORUS    Collection Time: 08/26/18  5:44 PM   Result Value Ref Range    Phosphorus 2.6 2.5 - 4.5 MG/DL   BLOOD GAS & LYTES, ARTERIAL    Collection Time: 08/26/18  5:59 PM   Result Value Ref Range    pH 7.44 7.35 - 7.45      PCO2 36 35 - 45 mmHg    PO2 108 (H) 80 - 105 mmHg    BICARBONATE 24 22 - 26 mmol/L    BASE EXCESS 0.2 0 - 3 mmol/L    TOTAL HEMOGLOBIN 12.7 11.7 - 15.0 GM/DL    O2 SAT 98 92 - 98.5 %    Arterial O2 Hgb 97.4 (H) 94 - 97 %    CARBOXYHEMOGLOBIN 0.0 (L) 0.5 - 1.5 %    METHEMOGLOBIN 0.7 0.0 - 1.5 %    DEOXYHEMOGLOBIN 2 0.0 - 5.0 %    Sodium 140.8 135 - 148 MMOL/L    POTASSIUM 3.29 (L) 3.5 - 5.3 MMOL/L    CHLORIDE 107 (H) 98 - 106      Calcium, ionized 1.13 1.0 - 1.3 mmol/L    SITE RR     ALLENS TEST POSITIVE      MODE NC     O2 FLOW 2.00 L/min   GLUCOSE, POC    Collection Time: 08/26/18  9:22 PM   Result Value Ref Range    Glucose (POC) 126 (H) 65 - 100 mg/dL   TROPONIN I    Collection Time: 08/26/18 10:53 PM   Result Value Ref Range    Troponin-I, Qt. <0.02 (L) 0.02 - 0.05 NG/ML   CBC WITH AUTOMATED DIFF    Collection Time: 08/27/18  3:28 AM   Result Value Ref Range    WBC 9.9 4.3 - 11.1 K/uL    RBC 4.02 (L) 4.05 - 5.2 M/uL    HGB 12.0 11.7 - 15.4 g/dL    HCT 36.8 35.8 - 46.3 %    MCV 91.5 79.6 - 97.8 FL    MCH 29.9 26.1 - 32.9 PG    MCHC 32.6 31.4 - 35.0 g/dL    RDW 12.6 %    PLATELET 845 437 - 963 K/uL    MPV 10.1 9.4 - 12.3 FL    ABSOLUTE NRBC 0.00 0.0 - 0.2 K/uL    DF AUTOMATED      NEUTROPHILS 92 (H) 43 - 78 %    LYMPHOCYTES 7 (L) 13 - 44 %    MONOCYTES 1 (L) 4.0 - 12.0 %    EOSINOPHILS 0 (L) 0.5 - 7.8 %    BASOPHILS 0 0.0 - 2.0 %    IMMATURE GRANULOCYTES 1 0.0 - 5.0 %    ABS. NEUTROPHILS 9.1 (H) 1.7 - 8.2 K/UL    ABS. LYMPHOCYTES 0.7 0.5 - 4.6 K/UL    ABS. MONOCYTES 0.1 0.1 - 1.3 K/UL    ABS. EOSINOPHILS 0.0 0.0 - 0.8 K/UL    ABS. BASOPHILS 0.0 0.0 - 0.2 K/UL    ABS. IMM.  GRANS. 0.1 0.0 - 0.5 K/UL   METABOLIC PANEL, BASIC    Collection Time: 08/27/18  3:28 AM   Result Value Ref Range    Sodium 143 136 - 145 mmol/L    Potassium 3.8 3.5 - 5.1 mmol/L    Chloride 106 98 - 107 mmol/L    CO2 26 21 - 32 mmol/L Anion gap 11 7 - 16 mmol/L    Glucose 140 (H) 65 - 100 mg/dL    BUN 9 6 - 23 MG/DL    Creatinine 0.64 0.6 - 1.0 MG/DL    GFR est AA >60 >60 ml/min/1.73m2    GFR est non-AA >60 >60 ml/min/1.73m2    Calcium 7.5 (L) 8.3 - 10.4 MG/DL   TROPONIN I    Collection Time: 08/27/18  3:28 AM   Result Value Ref Range    Troponin-I, Qt. <0.02 (L) 0.02 - 0.05 NG/ML   GLUCOSE, POC    Collection Time: 08/27/18  7:35 AM   Result Value Ref Range    Glucose (POC) 109 (H) 65 - 100 mg/dL   GLUCOSE, POC    Collection Time: 08/27/18 11:08 AM   Result Value Ref Range    Glucose (POC) 128 (H) 65 - 100 mg/dL        All Micro Results     Procedure Component Value Units Date/Time    CULTURE, URINE [[id number]] Collected:  08/25/18 1939    Order Status:  Completed Specimen:  Urine from Clean catch Updated:  08/27/18 0729     Special Requests: NO SPECIAL REQUESTS        Culture result:         NO GROWTH AFTER SHORT PERIOD OF INCUBATION. FURTHER RESULTS TO FOLLOW AFTER OVERNIGHT INCUBATION.           Current Meds:  Current Facility-Administered Medications   Medication Dose Route Frequency    0.9% sodium chloride infusion  100 mL/hr IntraVENous CONTINUOUS    [START ON 8/28/2018] cholecalciferol (VITAMIN D3) tablet 2,000 Units  2,000 Units Oral DAILY    morphine injection 1 mg  1 mg IntraVENous Q4H PRN    LORazepam (ATIVAN) injection 1 mg  1 mg IntraVENous Q4H PRN    cefTRIAXone (ROCEPHIN) 1 g in 0.9% sodium chloride (MBP/ADV) 50 mL  1 g IntraVENous Q24H    LORazepam (ATIVAN) injection 1 mg  1 mg IntraVENous PRN    NUTRITIONAL SUPPORT ELECTROLYTE PRN ORDERS   Does Not Apply PRN    SUMAtriptan (IMITREX) tablet 50 mg  50 mg Oral BID PRN    insulin lispro (HUMALOG) injection 0-10 Units  0-10 Units SubCUTAneous AC&HS    sodium chloride (NS) flush 5-10 mL  5-10 mL IntraVENous Q8H    sodium chloride (NS) flush 5-10 mL  5-10 mL IntraVENous PRN    acetaminophen (TYLENOL) tablet 650 mg  650 mg Oral Q4H PRN    HYDROcodone-acetaminophen (NORCO) 5-325 mg per tablet 1 Tab  1 Tab Oral Q4H PRN    ondansetron (ZOFRAN) injection 4 mg  4 mg IntraVENous Q4H PRN       Other Studies (last 24 hours):  No results found. Assessment and Plan:     Hospital Problems as of 8/27/2018  Never Reviewed          Codes Class Noted - Resolved POA    * (Principal)Multiple sclerosis exacerbation ([facility] ICD-10-CM: G35  ICD-9-CM: 340  7/17/2014 - Present Yes        MS (multiple sclerosis) ([facility] ICD-10-CM: G35  ICD-9-CM: 914  12/5/2012 - Present Yes              PLAN:    Multiple seizure like activity- In house Neurology consulted. prn ativan.  prob uti- rocephin  Multiple sclerosis    DC planning/Dispo:    DVT ppx:  heparin    Signed:  Myles Norman MD

## 2018-08-27 NOTE — CONSULTS
Consult    Patient: Tanna Garcia MRN: [id number]     YOB: 1970  Age: 41 y.o. Sex: female      Subjective:      Tanna Garcia is a 41 y.o. female who is being seen for multiple sclerosis and seizure-like events. The patient was diagnosed with multiple sclerosis back in early 2000. She is currently not following with a neurologist but has been on Rebif and Copaxone in the past. She had some type of reaction to these medications so she is currently not on any disease modifying treatment. The patient presented to the hospital with 1-1/2 weeks of blurry vision in the right eye. She then developed paresthesias on the right side of her face,, down the right side of her body. Over the last few days, she has developed right-sided weakness which is now bilateral and effects all 4 extremities. She was started on high-dose methylprednisolone. Since starting this she has been having seizure-like events. During these events, her eyes are closed and her head is slightly extended with some back arching. She is nonverbal during these events. She does not bite her tongue, does not defecate or urinate. They last approximately 20-25 seconds in duration and she has had many of these events. It is unclear if any of them have occurred while the patient is sleeping. She reports that they are random and she cannot predict them cannot think of any triggers. She was started on Keppra and has been getting periodic Ativan for seizure-like episodes. Otherwise, the patient's is very little during examination. She mumbles making it very difficult to hear what she is saying.     Past Medical History:   Diagnosis Date    Autoimmune disease ([address]     multiple sclerosis    MS (multiple sclerosis) ([facility] [address] 12/5/2012    Other ill-defined conditions(799.89)     ms     Past Surgical History:   Procedure Laterality Date    HX GYN      ruptured ovarian cyst    HX ORTHOPAEDIC      right knee    HX OTHER SURGICAL      laperoscopy    LA COLONOSCOPY FLX DX W/COLLJ SPEC WHEN PFRMD  6/9/2010           Family History   Problem Relation Age of Onset    Hypertension Mother     Heart Disease Father     Cancer Paternal Aunt     Cancer Maternal Grandfather     Cancer Paternal Grandmother     Cancer Paternal Grandfather      Social History   Substance Use Topics    Smoking status: Never Smoker    Smokeless tobacco: Not on file    Alcohol use No      Current Facility-Administered Medications   Medication Dose Route Frequency Provider Last Rate Last Dose    0.9% sodium chloride infusion  100 mL/hr IntraVENous CONTINUOUS Albert Carreno  mL/hr at 08/27/18 1126 100 mL/hr at 08/27/18 1126    LORazepam (ATIVAN) injection 1 mg  1 mg IntraVENous Q4H PRN Maggy Miller.  Dinora Calhoun MD   1 mg at 08/26/18 1958    cefTRIAXone (ROCEPHIN) 1 g in 0.9% sodium chloride (MBP/ADV) 50 mL  1 g IntraVENous Q24H Casey Maria  mL/hr at 08/26/18 1930 1 g at 08/26/18 1930    levETIRAcetam (KEPPRA) 500 mg in 0.9% sodium chloride 100 mL IVPB  500 mg IntraVENous Q12H Casey Maria MD   500 mg at 08/27/18 1016    LORazepam (ATIVAN) injection 1 mg  1 mg IntraVENous PRN Albert Carreno MD   1 mg at 08/27/18 0235    NUTRITIONAL SUPPORT ELECTROLYTE PRN ORDERS   Does Not Apply PRN Albert Carreno MD        SUMAtriptan [facility]) tablet 50 mg  50 mg Oral BID PRN Cinthya Faye MD   50 mg at 08/27/18 1010    insulin lispro (HUMALOG) injection 0-10 Units  0-10 Units SubCUTAneous AC&HS Silver Coates NP   Stopped at 08/26/18 1630    sodium chloride (NS) flush 5-10 mL  5-10 mL IntraVENous Q8H Aristeo Rich MD   10 mL at 08/27/18 0514    sodium chloride (NS) flush 5-10 mL  5-10 mL IntraVENous PRN Aristeo Rich MD   5 mL at 08/26/18 1441    acetaminophen (TYLENOL) tablet 650 mg  650 mg Oral Q4H PRN Aristeo Rich MD   650 mg at 08/24/18 2128    HYDROcodone-acetaminophen (NORCO) 5-325 mg per tablet 1 Tab  1 Tab Oral Q4H PRN Mikayla Jeter MD   1 Tab at 08/27/18 0744    ondansetron (ZOFRAN) injection 4 mg  4 mg IntraVENous Q4H PRN Mikayla Jeter MD        methylPREDNISolone (PF) (SOLU-MEDROL) 1,000 mg in 0.9% sodium chloride 100 mL IVPB  1,000 mg IntraVENous Q24H Mikayla Jeter  mL/hr at 08/26/18 1728 1,000 mg at 08/26/18 1728        Allergies   Allergen Reactions    Meperidine Rash and Nausea and Vomiting    Vioxx [Rofecoxib] Hives       Review of Systems:  I attempted a 12 point review of systems; however, the patient could not participate        Objective:     Vitals:    08/27/18 0959 08/27/18 1030 08/27/18 1100 08/27/18 1130   BP: 119/76 135/82 125/83 118/73   Pulse: (!) 112 (!) 106 98 (!) 102   Resp: 14 12 18 17   Temp:    98.7 °F (37.1 °C)   SpO2: 97% 96% 96% 95%   Weight:       Height:            Physical Exam:  General - Well developed, well nourished, in no apparent distress. HEENT - Normocephalic, atraumatic. Conjunctiva, tympanic membranes, and oropharynx are clear. Neck - Supple without masses, no bruits   Cardiovascular - Regular rate and rhythm. Normal S1, S2 without murmurs, rubs, or gallops. Lungs - Clear to auscultation. Abdomen - Soft, nontender with normal bowel sounds. Extremities - Peripheral pulses intact. No edema and no rashes. Neurological examination - oriented to person, place, situation. Comprehension, attention, and memory are intact. Language and speech are normal. On cranial nerve examination pupils are equal round and reactive to light. Fundoscopic examination was limited due to patient cooperation. . Visual acuity is adequate. Visual fields are full to finger confrontation. Extraocular motility is normal. Face is symmetric. She reports numbness on the right side of her face. Edy Broach Hearing is intact to finger rustle bilaterally. Motor examination - There is normal muscle tone and bulk. Power significantly decreased in all 4 limbs with minimal effort and prominent giveaway weakness.  Muscle stretch reflexes are normoactive and there are no pathological reflexes present. Sensation his impaired to all modalities on the right side of the body. Cerebellar examination was limited but normal.  Gait and stance cannot be assessed as patient states that she cannot ambulate. CT Results (most recent): Personally Reviewed     Results from Hospital Encounter encounter on 08/24/18   CT HEAD WO CONT   Narrative Noncontrast head CT     Clinical Indication: Slurred speech, right leg numbness, facial numbness,  difficulty walking for one-2 weeks. Right M new weakness. History of multiple  sclerosis. Technique: Noncontrast axial images were obtained through the brain. All CT  scans at this location are performed using dose modulation techniques as  appropriate including the following: Automated exposure control, adjustment of  the MA and/or kV according to patient's size, or use of iterative reconstruction  technique. Comparison: Brain MRI 7/17/2014    Findings: There is no acute intracranial hemorrhage, hydrocephalus, intra-axial  mass, or mass-effect. There is no CT evidence of acute large artery territorial  infarction or abnormal extra-axial fluid collection. The mastoid air cells and paranasal sinuses are clear where imaged. No displaced skull fractures are present. Impression Impression: No acute intracranial abnormality.             Results for orders placed or performed during the hospital encounter of 08/24/18   EKG, 12 LEAD, INITIAL   Result Value Ref Range    Ventricular Rate 135 BPM    Atrial Rate 135 BPM    P-R Interval 122 ms    QRS Duration 70 ms    Q-T Interval 298 ms    QTC Calculation (Bezet) 447 ms    Calculated P Axis 59 degrees    Calculated R Axis 54 degrees    Calculated T Axis 66 degrees    Diagnosis       Sinus tachycardia  Possible Left atrial enlargement  Borderline ECG    Confirmed by LEVY SHELDON (), [facility] ([id number]) on 8/26/2018 12:54:54 PM          MRI Results (most recent): Personally Reviewed    Results from Hospital Encounter encounter on 08/24/18   MRI LUMB SPINE WO CONT   Narrative History: MS; acute neuro changes    Exam: MRI lumbar spine without contrast    Technique: Axial and sagittal T1 and T2-weighted sequences are available for  review. A sagittal STIR sequence is also available for review. No comparison. Findings: There is evidence of marrow reconversion. Degenerative disc signal noted L3-4  through L5-S1 with varying degrees of disc height loss. The conus is normal in  configuration and signal intensity throughout. Axial imaging demonstrates no  abnormal retroperitoneal lesions. L2-3: There is mild facet arthropathy without central or neural foraminal  narrowing. L3-L4: There is a small disc bulge without central or neural foraminal stenosis. L4-L5: There is a small disc bulge with fluid in the disc. Subarticular reactive  endplate change noted at this level. No central or neural foraminal narrowing. L5-S1: There is a disc bulge with mild facet arthropathy. There is mild left  neural foraminal narrowing. No right neural foraminal stenosis or central  stenosis. Impression Impression:   1. Evidence of marrow reconversion. 2. At L4-5, there is a small disc bulge with fluid in the disc. There is also  subarticular reactive endplate change at this level. This most likely reflects  the sequela of degenerative disc disease and discogenic edema. However, given  the fact that there is fluid within the disc, early discitis could have an  identical MR appearance. 3. Multilevel lumbar spondylosis with mild left neural foraminal narrowing at  L5-S1. Most recent CTA Personally Reviewed    Results from [place] encounter on 08/24/18   CT HEAD WO CONT   Narrative Noncontrast head CT     Clinical Indication: Slurred speech, right leg numbness, facial numbness,  difficulty walking for one-2 weeks. Right M new weakness. History of multiple  sclerosis. Technique: Noncontrast axial images were obtained through the brain. All CT  scans at this location are performed using dose modulation techniques as  appropriate including the following: Automated exposure control, adjustment of  the MA and/or kV according to patient's size, or use of iterative reconstruction  technique. Comparison: Brain MRI 7/17/2014    Findings: There is no acute intracranial hemorrhage, hydrocephalus, intra-axial  mass, or mass-effect. There is no CT evidence of acute large artery territorial  infarction or abnormal extra-axial fluid collection. The mastoid air cells and paranasal sinuses are clear where imaged. No displaced skull fractures are present. Impression Impression: No acute intracranial abnormality. MRI brain, cervical spine, thoracic spine. I do not see evidence of multiple sclerosis in the patient's cervical or thoracic spine imaging. She does have some disc edema in the lumbar spine. Her brain MRI does not show any new lesions. She has a minimal burden of disease. Assessment:     44-year-old woman with a diagnosis of multiple sclerosis. Neurology has been asked to consult on this patient in the setting of a presumed multiple sclerosis exacerbation. After the initiation of high-dose steroids the patient started having seizure-like events. Neurological examination shows prominent giveaway weakness and lack of upper motor neuron signs. The semiology of her seizure-like events are most consistent with psychogenic nonepileptic spells. EEG background is normal.  An event was captured which shows high amplitude artifact obscuring the background activity. This event was most consistent with a nonepileptic event such as psychogenic nonepileptic spells. Rarely, frontal lobe seizures can look similar to these events but this is very unlikely. The patient was started on Keppra. This can be discontinued. In regards to multiple sclerosis, it is unclear to me how extensive her workup was for this. I'm curious to know if she had oligoclonal bands on CSF testing. She does not have any new demyelinating lesions that warrant treatment with high-dose steroids. Therefore, methylprednisolone can be discontinued. Lumbar disc disease. She has degenerative disc disease. MRI notes fluid in the disc and the possibility of early discitis. This seems unlikely to me but is outside of my expertise. This finding would certainly not explain her full clinical picture. Plan:     Discontinue methylprednisolone    Discontinue Keppra    Do not give Ativan unless the patient has a generalized tonic-clonic seizure greater than 3 minutes in duration. Recommend psychiatry consultation for likely conversion disorder/psychogenic nonepileptic spells. I will discuss this with the patient further after reviewing her EEG and full. I need to look into her diagnosis of multiple sclerosis further. Her MRI is not classic for this condition. She should be on vitamin D 2000 international units daily. I do not recommend starting disease modifying treatment at this time. Signed By: Martín Crawford DO     August 27, 2018        I spent 75 minutes in the care of this patient, over half that time was talking to her and her  at bedside, counseling them on multiple sclerosis and seizure-like events. Addendum: I spent an additional 60 minutes with the patient and her . I discussed with them the possibility of this being a stress reaction/conversion disorder. They're resistant to this diagnosis. I recommend transfer for continuous EEG monitoring which is the gold standard for psychogenic nonepileptic spells. I contacted the patient's nurse will contact the patient's primary physician.

## 2018-08-27 NOTE — PROGRESS NOTES
Pt shaking with head arched backward witnessed by Mela Castillo and Tari 4, RN while this RN out of room. Episode lasted about 30 seconds. Pt HR 160s and O2 85. Unsure if vitals were accurate due to pt shaking. Afterward, pt able to follow commands, and motion to nurse. Speech still slurred. PRN ativan given. HR 94. /85. O2 sat 98%. Pt screaming when IV flushed. Pt without incontinence episode. IV flushed and draws back. No swelling or redness. Pt with no complaint of pain in IV when IVF running through IV. MD notified. Orders for ativan PRN.

## 2018-08-27 NOTE — PROCEDURES
Routine EEG Report    Reason for EE-year-old with multiple sclerosis being treated for an acute exacerbation. Having seizure-like events. On Keppra and has received Ativan. Technical Summary: This EEG was performed with a 32-channel digital EEG machine with electrodes placed according to the international 10-20 system of placement. This is a 30 minute routine EEG. Background:   During the maximal awake state a posterior dominant rhythm of 10 Hz was seen. It was well regulated and well sustained, symmetric, and reactive to eye opening. Anterior background consisted of medium amplitude activity in the alpha range with occasional intermixed beta frequencies. Hyperventilation: Hyperventilation was performed for 3 minutes with good effort and no abnormalities were seen. Photic Stimulation: Photic stimulation was performed at various flash frequencies and no abnormalites were seen. Sleep: Only drowsiness was obtained    Events: The patient had 1 typical event in which her eyes are closed and her head is extended. The duration of this event was approximately 20 seconds. EEG background was obscured by high amplitude artifact. Preceding background was normal.  No post ictal slowing on the EEG. Impression: This EEG is normal in the awake and sleep states. No interictal epileptiform discharges or lateralizing features were seen. The event described above was consistent with a nonepileptic event such as psychogenic nonepileptic spells.

## 2018-08-29 LAB
BACTERIA SPEC CULT: NORMAL
SERVICE CMNT-IMP: NORMAL

## 2019-08-12 ENCOUNTER — HOSPITAL ENCOUNTER (OUTPATIENT)
Dept: MAMMOGRAPHY | Age: 49
Discharge: HOME | End: 2019-08-12
Payer: COMMERCIAL

## 2019-08-12 DIAGNOSIS — Z12.31 VISIT FOR SCREENING MAMMOGRAM: ICD-10-CM

## 2019-08-12 PROCEDURE — 77067 SCR MAMMO BI INCL CAD: CPT

## 2022-01-01 NOTE — PROGRESS NOTES
PT Note:  Therapist is discontinuing physical therapy at this time due to transfer to unit. Ms. Aura Dillon physical therapy goals were not met. Please reorder PT when our services are again appropriate. Thank you.   Lani Russo, PT, DPT  8/27/2018 [Normal Growth] : growth [Normal Development] : development  [No Elimination Concerns] : elimination [Continue Regimen] : feeding [No Skin Concerns] : skin [Normal Sleep Pattern] : sleep [None] : no medical problems [Anticipatory Guidance Given] : Anticipatory guidance addressed as per the history of present illness section [Parental Well-Being] : parental well-being [Family Adjustment] : family adjustment [Feeding Routines] : feeding routines [Infant Adjustment] : infant adjustment [Safety] : safety [No Medications] : ~He/She~ is not on any medications [Parent/Guardian] : Parent/Guardian [] : The components of the vaccine(s) to be administered today are listed in the plan of care. The disease(s) for which the vaccine(s) are intended to prevent and the risks have been discussed with the caretaker.  The risks are also included in the appropriate vaccination information statements which have been provided to the patient's caregiver.  The caregiver has given consent to vaccinate. [FreeTextEntry1] :  When in car, patient should be in rear-facing car seat in back seat. Put baby to sleep on back, in own crib with no loose or soft bedding. Help baby to develop sleep and feeding routines. May offer pacifier if needed. Start tummy time when awake. Limit baby's exposure to others, especially those with fever or unknown vaccine status. Parents counseled to call if rectal temperature >100.4 degrees F.\par \par Vaccine(s) given today: HEPATITIS B\par \par The potential side effects of today's vaccine(s) and the risks of disease(s) which they are intended to prevent have been discussed with the caretaker.  The caretaker has given consent to vaccinate.\par \par \par

## 2023-03-01 ENCOUNTER — HOSPITAL ENCOUNTER (OUTPATIENT)
Age: 53
Setting detail: OBSERVATION
Discharge: HOME | End: 2023-03-02
Attending: EMERGENCY MEDICINE | Admitting: HOSPITALIST
Payer: MEDICARE

## 2023-03-01 ENCOUNTER — APPOINTMENT (OUTPATIENT)
Dept: CT IMAGING | Age: 53
End: 2023-03-01
Payer: MEDICARE

## 2023-03-01 DIAGNOSIS — R07.9 CHEST PAIN, UNSPECIFIED TYPE: ICD-10-CM

## 2023-03-01 DIAGNOSIS — R20.0 NUMBNESS AND TINGLING: ICD-10-CM

## 2023-03-01 DIAGNOSIS — R20.2 NUMBNESS AND TINGLING: ICD-10-CM

## 2023-03-01 DIAGNOSIS — R07.1 CHEST PAIN ON BREATHING: Primary | ICD-10-CM

## 2023-03-01 PROBLEM — R29.90 STROKE-LIKE SYMPTOMS: Status: ACTIVE | Noted: 2023-03-01

## 2023-03-01 LAB
ANION GAP SERPL CALC-SCNC: 5 MMOL/L (ref 2–11)
BASOPHILS # BLD: 0 K/UL (ref 0–0.2)
BASOPHILS NFR BLD: 1 % (ref 0–2)
BUN SERPL-MCNC: 20 MG/DL (ref 6–23)
CALCIUM SERPL-MCNC: 9.8 MG/DL (ref 8.3–10.4)
CHLORIDE SERPL-SCNC: 106 MMOL/L (ref 101–110)
CO2 SERPL-SCNC: 28 MMOL/L (ref 21–32)
CREAT SERPL-MCNC: 0.7 MG/DL (ref 0.6–1)
DIFFERENTIAL METHOD BLD: NORMAL
EOSINOPHIL # BLD: 0.1 K/UL (ref 0–0.8)
EOSINOPHIL NFR BLD: 1 % (ref 0.5–7.8)
ERYTHROCYTE [DISTWIDTH] IN BLOOD BY AUTOMATED COUNT: 12.9 % (ref 11.9–14.6)
GLUCOSE BLD STRIP.AUTO-MCNC: 80 MG/DL (ref 65–100)
GLUCOSE SERPL-MCNC: 89 MG/DL (ref 65–100)
HCT VFR BLD AUTO: 42.4 % (ref 35.8–46.3)
HGB BLD-MCNC: 14.1 G/DL (ref 11.7–15.4)
IMM GRANULOCYTES # BLD AUTO: 0 K/UL (ref 0–0.5)
IMM GRANULOCYTES NFR BLD AUTO: 0 % (ref 0–5)
INR PPP: 0.9
LYMPHOCYTES # BLD: 0.8 K/UL (ref 0.5–4.6)
LYMPHOCYTES NFR BLD: 14 % (ref 13–44)
MCH RBC QN AUTO: 30.7 PG (ref 26.1–32.9)
MCHC RBC AUTO-ENTMCNC: 33.3 G/DL (ref 31.4–35)
MCV RBC AUTO: 92.4 FL (ref 82–102)
MONOCYTES # BLD: 0.6 K/UL (ref 0.1–1.3)
MONOCYTES NFR BLD: 10 % (ref 4–12)
NEUTS SEG # BLD: 4.4 K/UL (ref 1.7–8.2)
NEUTS SEG NFR BLD: 74 % (ref 43–78)
NRBC # BLD: 0 K/UL (ref 0–0.2)
PLATELET # BLD AUTO: 361 K/UL (ref 150–450)
PMV BLD AUTO: 10 FL (ref 9.4–12.3)
POTASSIUM SERPL-SCNC: 3.2 MMOL/L (ref 3.5–5.1)
PROTHROMBIN TIME: 12.2 SEC (ref 12.6–14.3)
RBC # BLD AUTO: 4.59 M/UL (ref 4.05–5.2)
SERVICE CMNT-IMP: NORMAL
SODIUM SERPL-SCNC: 139 MMOL/L (ref 133–143)
TROPONIN I SERPL HS-MCNC: 5.1 PG/ML (ref 0–14)
WBC # BLD AUTO: 6 K/UL (ref 4.3–11.1)

## 2023-03-01 PROCEDURE — G0378 HOSPITAL OBSERVATION PER HR: HCPCS

## 2023-03-01 PROCEDURE — 70450 CT HEAD/BRAIN W/O DYE: CPT

## 2023-03-01 PROCEDURE — 84484 ASSAY OF TROPONIN QUANT: CPT

## 2023-03-01 PROCEDURE — 82962 GLUCOSE BLOOD TEST: CPT

## 2023-03-01 PROCEDURE — 99285 EMERGENCY DEPT VISIT HI MDM: CPT

## 2023-03-01 PROCEDURE — 85025 COMPLETE CBC W/AUTO DIFF WBC: CPT

## 2023-03-01 PROCEDURE — 74174 CTA ABD&PLVS W/CONTRAST: CPT

## 2023-03-01 PROCEDURE — 93005 ELECTROCARDIOGRAM TRACING: CPT | Performed by: EMERGENCY MEDICINE

## 2023-03-01 PROCEDURE — 80048 BASIC METABOLIC PNL TOTAL CA: CPT

## 2023-03-01 PROCEDURE — 2580000003 HC RX 258: Performed by: EMERGENCY MEDICINE

## 2023-03-01 PROCEDURE — 6360000004 HC RX CONTRAST MEDICATION: Performed by: EMERGENCY MEDICINE

## 2023-03-01 PROCEDURE — 6370000000 HC RX 637 (ALT 250 FOR IP): Performed by: HOSPITALIST

## 2023-03-01 PROCEDURE — 85610 PROTHROMBIN TIME: CPT

## 2023-03-01 PROCEDURE — 6370000000 HC RX 637 (ALT 250 FOR IP): Performed by: EMERGENCY MEDICINE

## 2023-03-01 PROCEDURE — 94762 N-INVAS EAR/PLS OXIMTRY CONT: CPT

## 2023-03-01 RX ORDER — NARATRIPTAN 2.5 MG/1
2.5 TABLET ORAL
COMMUNITY

## 2023-03-01 RX ORDER — TRAZODONE HYDROCHLORIDE 50 MG/1
50-100 TABLET ORAL NIGHTLY
COMMUNITY

## 2023-03-01 RX ORDER — BACLOFEN 10 MG/1
5-10 TABLET ORAL NIGHTLY PRN
COMMUNITY

## 2023-03-01 RX ORDER — AMANTADINE HYDROCHLORIDE 100 MG/1
100 CAPSULE, GELATIN COATED ORAL 2 TIMES DAILY
COMMUNITY

## 2023-03-01 RX ORDER — SUMATRIPTAN 10 MG/1
1 SPRAY NASAL AS NEEDED
COMMUNITY

## 2023-03-01 RX ORDER — FINGOLIMOD HYDROCHLORIDE 0.5 MG/1
0.5 CAPSULE ORAL DAILY
COMMUNITY

## 2023-03-01 RX ORDER — TOPIRAMATE 100 MG/1
100 TABLET, FILM COATED ORAL DAILY
COMMUNITY

## 2023-03-01 RX ORDER — ENOXAPARIN SODIUM 100 MG/ML
40 INJECTION SUBCUTANEOUS DAILY
Status: CANCELLED | OUTPATIENT
Start: 2023-03-02

## 2023-03-01 RX ORDER — SODIUM CHLORIDE 0.9 % (FLUSH) 0.9 %
10 SYRINGE (ML) INJECTION
Status: COMPLETED | OUTPATIENT
Start: 2023-03-01 | End: 2023-03-01

## 2023-03-01 RX ORDER — ASPIRIN 300 MG/1
300 SUPPOSITORY RECTAL DAILY
Status: CANCELLED | OUTPATIENT
Start: 2023-03-02

## 2023-03-01 RX ORDER — POLYETHYLENE GLYCOL 3350 17 G/17G
17 POWDER, FOR SOLUTION ORAL DAILY PRN
Status: CANCELLED | OUTPATIENT
Start: 2023-03-01

## 2023-03-01 RX ORDER — NITROGLYCERIN 0.4 MG/1
0.4 TABLET SUBLINGUAL
Status: COMPLETED | OUTPATIENT
Start: 2023-03-01 | End: 2023-03-01

## 2023-03-01 RX ORDER — ONDANSETRON 2 MG/ML
4 INJECTION INTRAMUSCULAR; INTRAVENOUS EVERY 6 HOURS PRN
Status: CANCELLED | OUTPATIENT
Start: 2023-03-01

## 2023-03-01 RX ORDER — METOCLOPRAMIDE 10 MG/1
10 TABLET ORAL 3 TIMES DAILY PRN
COMMUNITY

## 2023-03-01 RX ORDER — ASPIRIN 81 MG/1
81 TABLET ORAL DAILY
Status: CANCELLED | OUTPATIENT
Start: 2023-03-02

## 2023-03-01 RX ORDER — ROSUVASTATIN CALCIUM 20 MG/1
40 TABLET, COATED ORAL NIGHTLY
Status: CANCELLED | OUTPATIENT
Start: 2023-03-01

## 2023-03-01 RX ORDER — FLUOXETINE HYDROCHLORIDE 20 MG/1
80 CAPSULE ORAL DAILY
COMMUNITY

## 2023-03-01 RX ORDER — ASPIRIN 325 MG
325 TABLET ORAL
Status: COMPLETED | OUTPATIENT
Start: 2023-03-01 | End: 2023-03-01

## 2023-03-01 RX ORDER — VALSARTAN 320 MG/1
320 TABLET ORAL DAILY
COMMUNITY

## 2023-03-01 RX ORDER — IBUPROFEN 800 MG/1
800 TABLET ORAL EVERY 8 HOURS PRN
Status: ON HOLD | COMMUNITY
End: 2023-03-03 | Stop reason: HOSPADM

## 2023-03-01 RX ORDER — HYDROCODONE BITARTRATE AND ACETAMINOPHEN 5; 325 MG/1; MG/1
1 TABLET ORAL ONCE
Status: COMPLETED | OUTPATIENT
Start: 2023-03-01 | End: 2023-03-01

## 2023-03-01 RX ORDER — NORTRIPTYLINE HYDROCHLORIDE 10 MG/1
10 CAPSULE ORAL DAILY PRN
COMMUNITY

## 2023-03-01 RX ORDER — OXYBUTYNIN CHLORIDE 5 MG/1
5 TABLET, EXTENDED RELEASE ORAL DAILY
COMMUNITY

## 2023-03-01 RX ORDER — 0.9 % SODIUM CHLORIDE 0.9 %
100 INTRAVENOUS SOLUTION INTRAVENOUS
Status: COMPLETED | OUTPATIENT
Start: 2023-03-01 | End: 2023-03-01

## 2023-03-01 RX ORDER — CLONAZEPAM 0.5 MG/1
0.5 TABLET ORAL NIGHTLY PRN
COMMUNITY

## 2023-03-01 RX ORDER — HYDROXYZINE HYDROCHLORIDE 10 MG/1
25 TABLET, FILM COATED ORAL EVERY 8 HOURS PRN
COMMUNITY

## 2023-03-01 RX ORDER — 0.9 % SODIUM CHLORIDE 0.9 %
1000 INTRAVENOUS SOLUTION INTRAVENOUS ONCE
Status: COMPLETED | OUTPATIENT
Start: 2023-03-01 | End: 2023-03-01

## 2023-03-01 RX ORDER — ONDANSETRON 4 MG/1
4 TABLET, ORALLY DISINTEGRATING ORAL EVERY 8 HOURS PRN
Status: CANCELLED | OUTPATIENT
Start: 2023-03-01

## 2023-03-01 RX ADMIN — SODIUM CHLORIDE 1000 ML: 9 INJECTION, SOLUTION INTRAVENOUS at 18:12

## 2023-03-01 RX ADMIN — SODIUM CHLORIDE 100 ML: 9 INJECTION, SOLUTION INTRAVENOUS at 19:27

## 2023-03-01 RX ADMIN — IOPAMIDOL 100 ML: 755 INJECTION, SOLUTION INTRAVENOUS at 19:27

## 2023-03-01 RX ADMIN — ASPIRIN 325 MG ORAL TABLET 325 MG: 325 PILL ORAL at 18:12

## 2023-03-01 RX ADMIN — NITROGLYCERIN 0.4 MG: 0.4 TABLET, ORALLY DISINTEGRATING SUBLINGUAL at 18:12

## 2023-03-01 RX ADMIN — HYDROCODONE BITARTRATE AND ACETAMINOPHEN 1 TABLET: 5; 325 TABLET ORAL at 22:34

## 2023-03-01 RX ADMIN — SODIUM CHLORIDE, PRESERVATIVE FREE 10 ML: 5 INJECTION INTRAVENOUS at 19:27

## 2023-03-01 ASSESSMENT — PAIN DESCRIPTION - DESCRIPTORS: DESCRIPTORS: SHOOTING

## 2023-03-01 ASSESSMENT — ENCOUNTER SYMPTOMS
RESPIRATORY NEGATIVE: 1
GASTROINTESTINAL NEGATIVE: 1
EYES NEGATIVE: 1

## 2023-03-01 ASSESSMENT — PAIN DESCRIPTION - ORIENTATION: ORIENTATION: LEFT

## 2023-03-01 ASSESSMENT — PAIN DESCRIPTION - LOCATION
LOCATION: HEAD;CHEST
LOCATION: HEAD

## 2023-03-01 ASSESSMENT — PAIN SCALES - GENERAL
PAINLEVEL_OUTOF10: 5
PAINLEVEL_OUTOF10: 2

## 2023-03-01 NOTE — ED TRIAGE NOTES
Pt was at PT today when left sided CP developed that radiated into the left arm. Pt also reports HA, left hand and LLE extremity numbness/tingling.  Recently started on BP medication  Hx: MS

## 2023-03-01 NOTE — ED NOTES
Pt to room 40 at this time, this RN assumed care. Pt ambulated with standby assistance with steady gait. Pt c/o chest pain radiating into L jaw and arm. Pt reports SOB. Pt A&Ox4 at this time. Mild distress noted.  Pt clutching chest.     Sincere Obrien RN  03/01/23 [id number]

## 2023-03-01 NOTE — ED PROVIDER NOTES
Emergency Department Provider Note                   PCP:                Unknown Unknown               Age: 50 y.o. Sex: female     DISPOSITION Decision To Transfer 03/02/2023 12:44:25 AM       ICD-10-CM    1. Chest pain on breathing  R07.1 Transthoracic echocardiogram (TTE) complete with contrast, bubble, strain, and 3D PRN      2. Chest pain, unspecified type  R07.9       3. Numbness and tingling  R20.0     R20.2           MEDICAL DECISION MAKING  Complexity of Problems Addressed:  1 chronic illnesses with severe exacerbation or progression. 1 acute illness that poses a threat to life or bodily function. Data Reviewed and Analyzed:  Category 1:     I ordered each unique test.  I reviewed the results of each unique test.        Category 2:   ED EKG was independently interpreted in the absence of a cardiologist.  Rate: 112  EKG Interpretation: EKG Interpretation: sinus rhythm  ST Segments: Normal ST segments - NO STEMI      Category 3: Discussion of management or test interpretation. He does have a history of multiple sclerosis however her typical symptoms were right-sided and patient has had a headache and left-sided numbness tingling for the past several days. Concern for stroke. I did obtain a CT that showed nonspecific abnormalities with the white matter which questionable to be stroke versus MS versus other. Patient also endorsed some chest pain. Based off the statement of chest pain along with focal neurodeficit I did do a CTA to rule out dissection. This ultimately did not show dissection. It did show a small aneurysm unlikely to have caused any of her symptoms. Due to this multitude of symptoms and concern for possible stroke versus other neurological event patient will be admitted. I did speak with the hospitalist who agrees with admission.     Risk of Complications and/or Morbidity of Patient Management:  Patient was admitted I have communication with admitting physician     Kiran Rowdy Castellano is a 50 y.o. female who presents to the Emergency Department with chief complaint of  No chief complaint on file. 82-year-old female with past medical history of multiple sclerosis presenting with chest pain along with headache left arm and leg numbness tingling. The left sided arm and leg numbness tingling started several days ago however the chest pain started today. Some of the pain does radiate to the left arm. No history of heart problems. She does endorse a history of anorexia and says she has not been eating very well. She does have a history of multiple sclerosis on medication and says usually when she gets multiple sclerosis flareups are on the right side of the left side is new. She was at physical therapy today when they noticed she was having chest pain rating to her left arm and had a blood pressure of 160/110. They told her to come to the emergency department. Review of Systems   HENT: Negative. Eyes: Negative. Respiratory: Negative. Cardiovascular:  Positive for chest pain. Gastrointestinal: Negative. Genitourinary: Negative. Musculoskeletal: Negative. Skin: Negative. Neurological:  Positive for numbness and headaches. Psychiatric/Behavioral: Negative. Vitals signs and nursing note reviewed. Patient Vitals for the past 4 hrs:   BP   03/02/23 0015 (!) 141/98   03/01/23 2345 (!) 157/107          Physical Exam  Constitutional:       General: She is not in acute distress. Appearance: Normal appearance. She is not ill-appearing. HENT:      Head: Normocephalic and atraumatic. Nose: No rhinorrhea. Mouth/Throat:      Mouth: Mucous membranes are moist.   Eyes:      Extraocular Movements: Extraocular movements intact. Cardiovascular:      Rate and Rhythm: Regular rhythm. Tachycardia present. Pulmonary:      Effort: Pulmonary effort is normal.      Breath sounds: Normal breath sounds. Abdominal:      General: Abdomen is flat.  Bowel sounds are normal. There is no distension. Palpations: Abdomen is soft. Tenderness: There is no abdominal tenderness. Musculoskeletal:         General: Normal range of motion. Cervical back: Normal range of motion. Skin:     General: Skin is warm and dry. Neurological:      General: No focal deficit present. Mental Status: She is alert. Comments: Patient has 4 out of 5 strength in all 4 extremities. She has decreased sensation to light touch in left arm and left leg. Normal sensation in right arm right leg.   Cranial nerves II through XII grossly intact   Psychiatric:         Mood and Affect: Mood normal.        Procedures          Orders Placed This Encounter   Procedures    CT HEAD WO CONTRAST    CTA CHEST ABDOMEN PELVIS W WO CONTRAST    Basic Metabolic Panel    CBC with Auto Differential    Protime-INR    Troponin    Diet NPO    Cardiac Monitor - ED Only    Continuous Pulse Oximetry    Neuro checks    NIH STROKE SCALE ASSESSMENT    Weigh patient    POCT Glucose    POCT Glucose    EKG 12 Lead    Saline lock IV    Place in Observation Service        Medications   aspirin tablet 325 mg (325 mg Oral Given 3/1/23 1812)   nitroGLYCERIN (NITROSTAT) SL tablet 0.4 mg (0.4 mg SubLINGual Given 3/1/23 1812)   0.9 % sodium chloride bolus (0 mLs IntraVENous Stopped 3/1/23 2236)   0.9 % sodium chloride bolus (0 mLs IntraVENous Stopped 3/1/23 2236)   sodium chloride flush 0.9 % injection 10 mL (10 mLs IntraVENous Given 3/1/23 1927)   iopamidol (ISOVUE-370) 76 % injection 100 mL (100 mLs IntraVENous Given 3/1/23 1927)   HYDROcodone-acetaminophen (NORCO) 5-325 MG per tablet 1 tablet (1 tablet Oral Given 3/1/23 2234)       Discharge Medication List as of 3/2/2023 12:44 AM           Past Medical History:   Diagnosis Date    Autoimmune disease ([facility] [address]     multiple sclerosis    MS (multiple sclerosis) ([facility] [address] 12/5/2012    Other ill-defined conditions(799.89)     ms        Past Surgical History:   Procedure Laterality Date    COLONOSCOPY FLX DX W/COLLJ SPEC WHEN PFRMD  6/9/2010         GYN      ruptured ovarian cyst    ORTHOPEDIC SURGERY      right knee    OTHER SURGICAL HISTORY      laperoscopy        Family History   Problem Relation Age of Onset    Breast Cancer Paternal Grandmother 64    Cancer Paternal Grandmother     Cancer Paternal Grandfather     Hypertension Mother     Heart Disease Father     Cancer Paternal Aunt     Breast Cancer Paternal Aunt 52    Cancer Maternal Grandfather         Social History     Socioeconomic History    Marital status:    Tobacco Use    Smoking status: Never   Substance and Sexual Activity    Alcohol use: No     Alcohol/week: 0.8 standard drinks    Drug use: No        Allergies: Rofecoxib and Meperidine    Discharge Medication List as of 3/2/2023 12:44 AM        CONTINUE these medications which have NOT CHANGED    Details   amantadine (SYMMETREL) 100 MG capsule Take 100 mg by mouth 2 times dailyHistorical Med      baclofen (LIORESAL) 10 MG tablet Take 5-10 mg by mouth nightly as neededHistorical Med      clonazePAM (KLONOPIN) 0.5 MG tablet Take 0.5 mg by mouth nightly as needed. Historical Med      Fingolimod HCl 0.5 MG CAPS Take 0.5 mg by mouth dailyHistorical Med      FLUoxetine (PROZAC) 20 MG capsule Take 20 mg by mouth dailyHistorical Med      hydrOXYzine HCl (ATARAX) 10 MG tablet Take 25 mg by mouth every 8 hours as needed for ItchingHistorical Med      ibuprofen (ADVIL;MOTRIN) 800 MG tablet Take 800 mg by mouth every 8 hours as needed for PainHistorical Med      metoclopramide (REGLAN) 10 MG tablet Take 10 mg by mouth 3 times daily as neededHistorical Med      naratriptan (AMERGE) 2.5 MG tablet Take 2.5 mg by mouth once as needed for Migraine 2.5 mg at onset of headache, may repeat in 4 hours if neededHistorical Med      nortriptyline (PAMELOR) 10 MG capsule Take 10 mg by mouth daily as neededHistorical Med      oxybutynin (DITROPAN-XL) 5 MG extended release tablet Take 5 mg by mouth dailyHistorical Med      topiramate (TOPAMAX) 100 MG tablet Take 100 mg by mouth dailyHistorical Med      SUMAtriptan (TOSYMRA) 10 MG/ACT SOLN 1 spray by Nasal route as neededHistorical Med      traZODone (DESYREL) 50 MG tablet Take  mg by mouth nightlyHistorical Med      valsartan (DIOVAN) 320 MG tablet Take 320 mg by mouth dailyHistorical Med              Results for orders placed or performed during the hospital encounter of 03/02/23   Troponin   Result Value Ref Range    Troponin, High Sensitivity 6.0 0 - 14 pg/mL   Results for orders placed or performed during the hospital encounter of 03/01/23   CT HEAD WO CONTRAST    Narrative    EXAMINATION: CT HEAD WO CONTRAST    DATE: 3/1/2023 7:30 PM     INDICATION: Stroke, headache, left arm and leg numbness     COMPARISON: Head CT from 8/24/2018. TECHNIQUE: Thin section noncontrast axial images were obtained through the head. Coronal reformats were created. CT dose lowering techniques were used, to   include: automated exposure control, adjustment for patient size, and or use of   iterative reconstruction. FINDINGS:     Intracranial contents:    No acute intracranial hemorrhage, evidence of acute territorial infarct, mass,   mass effect or hydrocephalus. Moderate patchy hypodensity throughout the white   matter. Trace intracranial atherosclerosis. Cerebral volume is normal.    Bones and extracranial soft tissues:     No fracture or focal osseous lesion in the calvarium or skull base. Paranasal   sinuses are clear where visualized. Mastoid air cells and middle ear cavities   are clear bilaterally. No acute findings in the orbits. Impression    1. No acute intracranial abnormality visible by CT  2. Moderate patchy hypodensity throughout the white matter, not present on a   prior scan from 2018.  This finding can be seen in the setting of chronic small   vessel ischemic changes, but would expect to have at least some degree of this finding in 2018, and the patient is also a young for this finding unless there   is a strong history of cardiovascular disease. Given the provided history,   consider noncontrast brain MRI for further evaluation. This examination was interpreted by HORACIO Dior M.D.   3/1/2023 8:37:00 PM   CTA CHEST ABDOMEN PELVIS W WO CONTRAST    Narrative    EXAM: CT ANGIOGRAM OF THE CHEST, ABDOMEN AND PELVIS WITH IV CONTRAST    INDICATIONS: Chest pain, extremity numbness    TECHNIQUE: CT of the chest, abdomen and pelvis was performed following the   administration of 100 mL Isovue-370 intravenous contrast. CT dose lowering   techniques were used, to include: automated exposure control, adjustment for   patient size, and / or use of iterative reconstruction. MIP images were   generated. COMPARISON: No prior  CTs are available. FINDINGS:  CHEST:  Vasculature: There is no evidence of aortic dissection or acute aortic syndrome. There is fusiform aneurysmal dilatation of the ascending aorta measuring 4.1 cm   in diameter. No pulmonary arterial filling defect is identified. Mediastinum / Pleura: There is no pericardial or pleural effusion. There is no   mediastinal, hilar or axillary lymphadenopathy. Airways / Lungs: The central airways are patent. There is no pneumothorax or   consolidative parenchymal disease. ABDOMEN:  Liver: Normal angiographic phase enhancement pattern. Gallbladder and Bile Ducts: Unremarkable CT appearance of the gallbladder. There   is no intrahepatic or extrahepatic biliary ductal dilatation. Spleen: Normal heterogeneous angiographic phase enhancement pattern. Pancreas: The pancreatic parenchyma is unremarkable. There is no pancreatic   ductal dilatation. Adrenals: Unremarkable without nodularity. Kidneys: There is symmetric enhancement without hydronephrosis. No focal lesion   is demonstrated.     Vasculature: No evidence of atherosclerosis or aneurysm. Nodes: There is no lymphadenopathy by size criteria. Bowel: The stomach and visualized loops of large and small bowel are   unremarkable. An unremarkable appendix is identified. Mesentery/Peritoneum: A fat-containing umbilical hernia is present. Soft Tissues: Unremarkable    PELVIS:  Pelvic Organs: There is no abnormal pelvic mass. The urinary bladder is   unremarkable. Bones: No destructive osseous lesions. Impression    1. No evidence of aortic dissection or acute aortic syndrome. No evidence of   pulmonary embolism. 2. There is otherwise no evidence of acute disease in the chest, abdomen or   pelvis, accounting for the limitations of the angiographic technique. 3. Fusiform ascending aortic aneurysm measuring 4.1 cm in diameter. 4. Fat-containing umbilical hernia.      Juani Echavarria M.D.   3/1/2023 8:33:00 PM   Basic Metabolic Panel   Result Value Ref Range    Sodium 139 133 - 143 mmol/L    Potassium 3.2 (L) 3.5 - 5.1 mmol/L    Chloride 106 101 - 110 mmol/L    CO2 28 21 - 32 mmol/L    Anion Gap 5 2 - 11 mmol/L    Glucose 89 65 - 100 mg/dL    BUN 20 6 - 23 MG/DL    Creatinine 0.70 0.6 - 1.0 MG/DL    Est, Glom Filt Rate >60 >60 ml/min/1.73m2    Calcium 9.8 8.3 - 10.4 MG/DL   CBC with Auto Differential   Result Value Ref Range    WBC 6.0 4.3 - 11.1 K/uL    RBC 4.59 4.05 - 5.2 M/uL    Hemoglobin 14.1 11.7 - 15.4 g/dL    Hematocrit 42.4 35.8 - 46.3 %    MCV 92.4 82 - 102 FL    MCH 30.7 26.1 - 32.9 PG    MCHC 33.3 31.4 - 35.0 g/dL    RDW 12.9 11.9 - 14.6 %    Platelets 240 761 - 041 K/uL    MPV 10.0 9.4 - 12.3 FL    nRBC 0.00 0.0 - 0.2 K/uL    Differential Type AUTOMATED      Seg Neutrophils 74 43 - 78 %    Lymphocytes 14 13 - 44 %    Monocytes 10 4.0 - 12.0 %    Eosinophils % 1 0.5 - 7.8 %    Basophils 1 0.0 - 2.0 %    Immature Granulocytes 0 0.0 - 5.0 %    Segs Absolute 4.4 1.7 - 8.2 K/UL    Absolute Lymph # 0.8 0.5 - 4.6 K/UL    Absolute Mono # 0.6 0.1 - 1.3 K/UL    Absolute Eos # 0.1 0.0 - 0.8 K/UL    Basophils Absolute 0.0 0.0 - 0.2 K/UL    Absolute Immature Granulocyte 0.0 0.0 - 0.5 K/UL   Protime-INR   Result Value Ref Range    Protime 12.2 (L) 12.6 - 14.3 sec    INR 0.9     Troponin   Result Value Ref Range    Troponin, High Sensitivity 5.1 0 - 14 pg/mL   POCT Glucose   Result Value Ref Range    POC Glucose 80 65 - 100 mg/dL    Performed by: [employer]    EKG 12 Lead   Result Value Ref Range    Ventricular Rate 112 BPM    Atrial Rate 112 BPM    P-R Interval 118 ms    QRS Duration 72 ms    Q-T Interval 340 ms    QTc Calculation (Bazett) 464 ms    P Axis 78 degrees    R Axis 72 degrees    T Axis 75 degrees    Diagnosis Sinus tachycardia         CT HEAD WO CONTRAST   Final Result      1. No acute intracranial abnormality visible by CT   2. Moderate patchy hypodensity throughout the white matter, not present on a    prior scan from 2018. This finding can be seen in the setting of chronic small    vessel ischemic changes, but would expect to have at least some degree of this    finding in 2018, and the patient is also a young for this finding unless there    is a strong history of cardiovascular disease. Given the provided history,    consider noncontrast brain MRI for further evaluation. This examination was interpreted by HORACIO Garrido M.D.    3/1/2023 8:37:00 PM      CTA CHEST ABDOMEN PELVIS W WO CONTRAST   Final Result      1. No evidence of aortic dissection or acute aortic syndrome. No evidence of    pulmonary embolism. 2. There is otherwise no evidence of acute disease in the chest, abdomen or    pelvis, accounting for the limitations of the angiographic technique. 3. Fusiform ascending aortic aneurysm measuring 4.1 cm in diameter. 4. Fat-containing umbilical hernia.        Ailyn Tinoco M.D.    3/1/2023 8:33:00 PM           NIH Stroke Scale  Interval: Baseline  Level of Consciousness (1a): Alert  LOC Questions (1b): Answers both correctly  Best Gaze (2): Normal  Visual (3): No visual loss  Facial Palsy (4): Normal symmetrical movement  Motor Arm, Left (5a): No drift  Motor Arm, Right (5b): No drift  Motor Leg, Left (6a): No drift  Motor Leg, Right (6b): No drift  Limb Ataxia (7): Absent  Sensory (8): (!) Mild to Moderate  Best Language (9): No aphasia  Dysarthria (10): Normal  Extinction and Inattention (11): No abnormality            Voice dictation software was used during the making of this note. This software is not perfect and grammatical and other typographical errors may be present. This note has not been completely proofread for errors.      Darryl Valenzuela MD  03/02/23 0227       Darryl Valenzuela MD  03/02/23 6800

## 2023-03-02 ENCOUNTER — APPOINTMENT (OUTPATIENT)
Dept: MRI IMAGING | Age: 53
End: 2023-03-02
Attending: FAMILY MEDICINE
Payer: MEDICARE

## 2023-03-02 ENCOUNTER — HOSPITAL ENCOUNTER (OUTPATIENT)
Age: 53
Setting detail: OBSERVATION
Discharge: HOME | End: 2023-03-03
Attending: FAMILY MEDICINE | Admitting: HOSPITALIST
Payer: MEDICARE

## 2023-03-02 ENCOUNTER — APPOINTMENT (OUTPATIENT)
Dept: NON INVASIVE DIAGNOSTICS | Age: 53
End: 2023-03-02
Attending: FAMILY MEDICINE
Payer: MEDICARE

## 2023-03-02 VITALS
DIASTOLIC BLOOD PRESSURE: 98 MMHG | HEART RATE: 91 BPM | SYSTOLIC BLOOD PRESSURE: 141 MMHG | RESPIRATION RATE: 16 BRPM | OXYGEN SATURATION: 97 % | TEMPERATURE: 97.7 F

## 2023-03-02 DIAGNOSIS — R07.1 CHEST PAIN ON BREATHING: Primary | ICD-10-CM

## 2023-03-02 LAB
ECHO AO ASC DIAM: 3.2 CM
ECHO AO ASCENDING AORTA INDEX: 2.03 CM/M2
ECHO AO ROOT DIAM: 3.1 CM
ECHO AO ROOT INDEX: 1.96 CM/M2
ECHO AV AREA PEAK VELOCITY: 2.6 CM2
ECHO AV AREA VTI: 2.4 CM2
ECHO AV AREA/BSA PEAK VELOCITY: 1.6 CM2/M2
ECHO AV AREA/BSA VTI: 1.5 CM2/M2
ECHO AV MEAN GRADIENT: 3 MMHG
ECHO AV MEAN VELOCITY: 0.9 M/S
ECHO AV PEAK GRADIENT: 5 MMHG
ECHO AV PEAK VELOCITY: 1.2 M/S
ECHO AV VELOCITY RATIO: 0.92
ECHO AV VTI: 19 CM
ECHO BSA: 1.59 M2
ECHO EST RA PRESSURE: 3 MMHG
ECHO IVC EXP: 1.3 CM
ECHO LA AREA 2C: 11.8 CM2
ECHO LA AREA 4C: 12.7 CM2
ECHO LA DIAMETER INDEX: 1.33 CM/M2
ECHO LA DIAMETER: 2.1 CM
ECHO LA MAJOR AXIS: 4.2 CM
ECHO LA MINOR AXIS: 4.3 CM
ECHO LA TO AORTIC ROOT RATIO: 0.68
ECHO LA VOL 2C: 28 ML (ref 22–52)
ECHO LA VOL 4C: 28 ML (ref 22–52)
ECHO LA VOL BP: 29 ML (ref 22–52)
ECHO LA VOL/BSA BIPLANE: 18 ML/M2 (ref 16–34)
ECHO LA VOLUME INDEX A2C: 18 ML/M2 (ref 16–34)
ECHO LA VOLUME INDEX A4C: 18 ML/M2 (ref 16–34)
ECHO LV E' LATERAL VELOCITY: 10 CM/S
ECHO LV E' SEPTAL VELOCITY: 7 CM/S
ECHO LV EDV A2C: 70 ML
ECHO LV EDV A4C: 81 ML
ECHO LV EDV INDEX A4C: 51 ML/M2
ECHO LV EDV NDEX A2C: 44 ML/M2
ECHO LV EJECTION FRACTION A2C: 60 %
ECHO LV EJECTION FRACTION A4C: 59 %
ECHO LV EJECTION FRACTION BIPLANE: 59 % (ref 55–100)
ECHO LV ESV A2C: 28 ML
ECHO LV ESV A4C: 33 ML
ECHO LV ESV INDEX A2C: 18 ML/M2
ECHO LV ESV INDEX A4C: 21 ML/M2
ECHO LV FRACTIONAL SHORTENING: 36 % (ref 28–44)
ECHO LV INTERNAL DIMENSION DIASTOLE INDEX: 2.66 CM/M2
ECHO LV INTERNAL DIMENSION DIASTOLIC: 4.2 CM (ref 3.9–5.3)
ECHO LV INTERNAL DIMENSION SYSTOLIC INDEX: 1.71 CM/M2
ECHO LV INTERNAL DIMENSION SYSTOLIC: 2.7 CM
ECHO LV IVSD: 0.8 CM (ref 0.6–0.9)
ECHO LV MASS 2D: 109.8 G (ref 67–162)
ECHO LV MASS INDEX 2D: 69.5 G/M2 (ref 43–95)
ECHO LV POSTERIOR WALL DIASTOLIC: 0.9 CM (ref 0.6–0.9)
ECHO LV RELATIVE WALL THICKNESS RATIO: 0.43
ECHO LVOT AREA: 2.8 CM2
ECHO LVOT AV VTI INDEX: 0.83
ECHO LVOT DIAM: 1.9 CM
ECHO LVOT MEAN GRADIENT: 3 MMHG
ECHO LVOT PEAK GRADIENT: 4 MMHG
ECHO LVOT PEAK VELOCITY: 1.1 M/S
ECHO LVOT STROKE VOLUME INDEX: 28.2 ML/M2
ECHO LVOT SV: 44.5 ML
ECHO LVOT VTI: 15.7 CM
ECHO MV A VELOCITY: 1.13 M/S
ECHO MV AREA VTI: 2.5 CM2
ECHO MV E DECELERATION TIME (DT): 185 MS
ECHO MV E VELOCITY: 0.84 M/S
ECHO MV E/A RATIO: 0.74
ECHO MV E/E' LATERAL: 8.4
ECHO MV E/E' RATIO (AVERAGED): 10.2
ECHO MV E/E' SEPTAL: 12
ECHO MV LVOT VTI INDEX: 1.14
ECHO MV MAX VELOCITY: 1 M/S
ECHO MV MEAN GRADIENT: 2 MMHG
ECHO MV MEAN VELOCITY: 0.7 M/S
ECHO MV PEAK GRADIENT: 4 MMHG
ECHO MV VTI: 17.9 CM
ECHO PV ACCELERATION TIME (AT): 98 MS
ECHO PV MAX VELOCITY: 1.3 M/S
ECHO PV PEAK GRADIENT: 7 MMHG
ECHO RV BASAL DIMENSION: 2.9 CM
ECHO RV FREE WALL PEAK S': 15 CM/S
ECHO RV TAPSE: 2 CM (ref 1.7–?)
EKG ATRIAL RATE: 112 BPM
EKG DIAGNOSIS: NORMAL
EKG P AXIS: 78 DEGREES
EKG P-R INTERVAL: 118 MS
EKG Q-T INTERVAL: 340 MS
EKG QRS DURATION: 72 MS
EKG QTC CALCULATION (BAZETT): 464 MS
EKG R AXIS: 72 DEGREES
EKG T AXIS: 75 DEGREES
EKG VENTRICULAR RATE: 112 BPM
LV EF: 63 %
LVEF MODALITY: NORMAL
MAGNESIUM SERPL-MCNC: 1.8 MG/DL (ref 1.8–2.4)
TROPONIN I SERPL HS-MCNC: 6 PG/ML (ref 0–14)
TROPONIN I SERPL HS-MCNC: 8 PG/ML (ref 0–14)

## 2023-03-02 PROCEDURE — 83735 ASSAY OF MAGNESIUM: CPT

## 2023-03-02 PROCEDURE — 84484 ASSAY OF TROPONIN QUANT: CPT

## 2023-03-02 PROCEDURE — 97161 PT EVAL LOW COMPLEX 20 MIN: CPT

## 2023-03-02 PROCEDURE — G0378 HOSPITAL OBSERVATION PER HR: HCPCS

## 2023-03-02 PROCEDURE — 6370000000 HC RX 637 (ALT 250 FOR IP): Performed by: NURSE PRACTITIONER

## 2023-03-02 PROCEDURE — 93306 TTE W/DOPPLER COMPLETE: CPT

## 2023-03-02 PROCEDURE — 36415 COLL VENOUS BLD VENIPUNCTURE: CPT

## 2023-03-02 PROCEDURE — 93306 TTE W/DOPPLER COMPLETE: CPT | Performed by: INTERNAL MEDICINE

## 2023-03-02 PROCEDURE — 6360000002 HC RX W HCPCS: Performed by: NURSE PRACTITIONER

## 2023-03-02 PROCEDURE — 70551 MRI BRAIN STEM W/O DYE: CPT

## 2023-03-02 PROCEDURE — 2500000003 HC RX 250 WO HCPCS: Performed by: NURSE PRACTITIONER

## 2023-03-02 PROCEDURE — 97165 OT EVAL LOW COMPLEX 30 MIN: CPT

## 2023-03-02 PROCEDURE — 6370000000 HC RX 637 (ALT 250 FOR IP): Performed by: HOSPITALIST

## 2023-03-02 PROCEDURE — 6360000002 HC RX W HCPCS: Performed by: HOSPITALIST

## 2023-03-02 PROCEDURE — G0379 DIRECT REFER HOSPITAL OBSERV: HCPCS

## 2023-03-02 RX ORDER — MAGNESIUM HYDROXIDE/ALUMINUM HYDROXICE/SIMETHICONE 120; 1200; 1200 MG/30ML; MG/30ML; MG/30ML
30 SUSPENSION ORAL EVERY 6 HOURS PRN
Status: DISCONTINUED | OUTPATIENT
Start: 2023-03-02 | End: 2023-03-03 | Stop reason: HOSPADM

## 2023-03-02 RX ORDER — POLYETHYLENE GLYCOL 3350 17 G/17G
17 POWDER, FOR SOLUTION ORAL DAILY PRN
Status: DISCONTINUED | OUTPATIENT
Start: 2023-03-02 | End: 2023-03-03 | Stop reason: HOSPADM

## 2023-03-02 RX ORDER — BACLOFEN 10 MG/1
5 TABLET ORAL NIGHTLY PRN
Status: DISCONTINUED | OUTPATIENT
Start: 2023-03-02 | End: 2023-03-03 | Stop reason: HOSPADM

## 2023-03-02 RX ORDER — LORAZEPAM 2 MG/ML
1 INJECTION INTRAMUSCULAR ONCE
Status: COMPLETED | OUTPATIENT
Start: 2023-03-02 | End: 2023-03-02

## 2023-03-02 RX ORDER — ACETAMINOPHEN 325 MG/1
650 TABLET ORAL EVERY 4 HOURS PRN
Status: DISCONTINUED | OUTPATIENT
Start: 2023-03-02 | End: 2023-03-03 | Stop reason: HOSPADM

## 2023-03-02 RX ORDER — ROSUVASTATIN CALCIUM 20 MG/1
40 TABLET, COATED ORAL NIGHTLY
Status: DISCONTINUED | OUTPATIENT
Start: 2023-03-02 | End: 2023-03-03 | Stop reason: HOSPADM

## 2023-03-02 RX ORDER — ONDANSETRON 2 MG/ML
4 INJECTION INTRAMUSCULAR; INTRAVENOUS EVERY 6 HOURS PRN
Status: DISCONTINUED | OUTPATIENT
Start: 2023-03-02 | End: 2023-03-03 | Stop reason: HOSPADM

## 2023-03-02 RX ORDER — ONDANSETRON 4 MG/1
4 TABLET, ORALLY DISINTEGRATING ORAL EVERY 8 HOURS PRN
Status: DISCONTINUED | OUTPATIENT
Start: 2023-03-02 | End: 2023-03-03 | Stop reason: HOSPADM

## 2023-03-02 RX ORDER — AMANTADINE HYDROCHLORIDE 100 MG/1
100 CAPSULE, GELATIN COATED ORAL 2 TIMES DAILY
Status: DISCONTINUED | OUTPATIENT
Start: 2023-03-02 | End: 2023-03-03 | Stop reason: HOSPADM

## 2023-03-02 RX ORDER — ASPIRIN 81 MG/1
81 TABLET ORAL DAILY
Status: DISCONTINUED | OUTPATIENT
Start: 2023-03-02 | End: 2023-03-03 | Stop reason: HOSPADM

## 2023-03-02 RX ORDER — CLONAZEPAM 0.5 MG/1
0.5 TABLET ORAL NIGHTLY PRN
Status: DISCONTINUED | OUTPATIENT
Start: 2023-03-02 | End: 2023-03-03 | Stop reason: HOSPADM

## 2023-03-02 RX ORDER — POTASSIUM CHLORIDE 20 MEQ/1
40 TABLET, EXTENDED RELEASE ORAL ONCE
Status: COMPLETED | OUTPATIENT
Start: 2023-03-02 | End: 2023-03-02

## 2023-03-02 RX ORDER — ENOXAPARIN SODIUM 100 MG/ML
40 INJECTION SUBCUTANEOUS DAILY
Status: DISCONTINUED | OUTPATIENT
Start: 2023-03-02 | End: 2023-03-03 | Stop reason: HOSPADM

## 2023-03-02 RX ORDER — TRAZODONE HYDROCHLORIDE 50 MG/1
50 TABLET ORAL NIGHTLY PRN
Status: DISCONTINUED | OUTPATIENT
Start: 2023-03-02 | End: 2023-03-03 | Stop reason: HOSPADM

## 2023-03-02 RX ORDER — FINGOLIMOD HYDROCHLORIDE 0.5 MG/1
0.5 CAPSULE ORAL DAILY
Status: DISCONTINUED | OUTPATIENT
Start: 2023-03-02 | End: 2023-03-03 | Stop reason: HOSPADM

## 2023-03-02 RX ORDER — FLUOXETINE HYDROCHLORIDE 20 MG/1
80 CAPSULE ORAL DAILY
Status: DISCONTINUED | OUTPATIENT
Start: 2023-03-02 | End: 2023-03-03 | Stop reason: HOSPADM

## 2023-03-02 RX ORDER — ASPIRIN 300 MG/1
300 SUPPOSITORY RECTAL DAILY
Status: DISCONTINUED | OUTPATIENT
Start: 2023-03-02 | End: 2023-03-03 | Stop reason: HOSPADM

## 2023-03-02 RX ORDER — OXYBUTYNIN CHLORIDE 5 MG/1
5 TABLET, EXTENDED RELEASE ORAL DAILY
Status: DISCONTINUED | OUTPATIENT
Start: 2023-03-02 | End: 2023-03-03 | Stop reason: HOSPADM

## 2023-03-02 RX ORDER — METOPROLOL TARTRATE 5 MG/5ML
5 INJECTION INTRAVENOUS
Status: COMPLETED | OUTPATIENT
Start: 2023-03-02 | End: 2023-03-02

## 2023-03-02 RX ORDER — AMANTADINE HYDROCHLORIDE 100 MG/1
100 CAPSULE, GELATIN COATED ORAL 2 TIMES DAILY
Status: DISCONTINUED | OUTPATIENT
Start: 2023-03-02 | End: 2023-03-02 | Stop reason: SDUPTHER

## 2023-03-02 RX ADMIN — AMANTADINE HYDROCHLORIDE 100 MG: 100 CAPSULE ORAL at 09:07

## 2023-03-02 RX ADMIN — ACETAMINOPHEN 650 MG: 325 TABLET, FILM COATED ORAL at 17:19

## 2023-03-02 RX ADMIN — ASPIRIN 81 MG: 81 TABLET, COATED ORAL at 09:07

## 2023-03-02 RX ADMIN — POTASSIUM CHLORIDE 40 MEQ: 1500 TABLET, EXTENDED RELEASE ORAL at 17:19

## 2023-03-02 RX ADMIN — ACETAMINOPHEN 650 MG: 325 TABLET, FILM COATED ORAL at 04:39

## 2023-03-02 RX ADMIN — LORAZEPAM 1 MG: 2 INJECTION INTRAMUSCULAR; INTRAVENOUS at 12:39

## 2023-03-02 RX ADMIN — ACETAMINOPHEN 650 MG: 325 TABLET, FILM COATED ORAL at 12:01

## 2023-03-02 RX ADMIN — FLUOXETINE 80 MG: 20 CAPSULE ORAL at 09:07

## 2023-03-02 RX ADMIN — ENOXAPARIN SODIUM 40 MG: 100 INJECTION SUBCUTANEOUS at 09:07

## 2023-03-02 RX ADMIN — OXYBUTYNIN CHLORIDE 5 MG: 5 TABLET, EXTENDED RELEASE ORAL at 09:07

## 2023-03-02 RX ADMIN — METOPROLOL TARTRATE 5 MG: 5 INJECTION INTRAVENOUS at 15:17

## 2023-03-02 RX ADMIN — FINGOLIMOD HYDROCHLORIDE 0.5 MG: 0.5 CAPSULE ORAL at 09:12

## 2023-03-02 ASSESSMENT — PAIN DESCRIPTION - DESCRIPTORS: DESCRIPTORS: ACHING

## 2023-03-02 ASSESSMENT — PAIN SCALES - GENERAL
PAINLEVEL_OUTOF10: 0
PAINLEVEL_OUTOF10: 3

## 2023-03-02 ASSESSMENT — PAIN DESCRIPTION - LOCATION: LOCATION: HEAD

## 2023-03-02 NOTE — DISCHARGE INSTRUCTIONS

## 2023-03-02 NOTE — PROGRESS NOTES
ACUTE PHYSICAL THERAPY GOALS:   (Developed with and agreed upon by patient and/or caregiver.)  STG:  (1.)Ms. Erica Sr will move from supine to sit and sit to supine  with INDEPENDENT within 7 treatment day(s). (2.)Ms. Erica Sr will transfer from bed to chair and chair to bed with SUPERVISION using the least restrictive device within 7 treatment day(s). (3.)Ms. Erica Sr will ambulate with SUPERVISION for 100 feet with the least restrictive device within 7 treatment day(s). (4)Ms. Erica Sr will perform HEP with cues and assistance to increase safety on her feet in 7 days  ________________________________________________________________________________________________      PHYSICAL THERAPY Initial Assessment and PM  (Link to Caseload Tracking: PT Visit Days : 1  Acknowledge Orders  Time In/Out  PT Charge Capture  Rehab Caseload Tracker    Kathleen Davila is a 50 y.o. female   PRIMARY DIAGNOSIS: Stroke-like symptoms  Stroke-like symptoms [R29.90]       Reason for Referral: Generalized Muscle Weakness (M62.81)  Other abnormalities of gait and mobility (R26.89)  Observation: Payor: MEDICARE / Plan: MEDICARE PART A AND B / Product Type: *No Product type* /     ASSESSMENT:     REHAB RECOMMENDATIONS:   Recommendation to date pending progress:  Setting:  Pt. Plans to resume her OPPT    Equipment:    None     ASSESSMENT:  Ms. Erica Sr presents with decreased functional mobility admitted with complaint of a headache and left UE numbness and tingling. She lives alone and is independent and uses a cane with gait. She refused this am as she was tearful and anxious. She agreed to move some this afternoon. She complains of a headache 5/10 and some left UE numbness. She transfers SBA and ambulated in the room SBA. She refused going out of the room. Her right LE is normally weaker from [address] but she had more difficulty moving left LE this afternoon but able to move functionally.   She plans to go home tomorrow and says she will be fine on her own. She plans to resume her OPPT.      [address] [facility] [address] [address] AM-PAC 6 Clicks Basic Mobility Inpatient Short Form  AM-PAC Basic Mobility - Inpatient   How much help is needed turning from your back to your side while in a flat bed without using bedrails?: None  How much help is needed moving from lying on your back to sitting on the side of a flat bed without using bedrails?: None  How much help is needed moving to and from a bed to a chair?: None  How much help is needed standing up from a chair using your arms?: None  How much help is needed walking in hospital room?: None  How much help is needed climbing 3-5 steps with a railing?: A Little  AM-[facility] Inpatient Mobility Raw Score : 23  AM-PAC Inpatient T-Scale Score : 56.93  Mobility Inpatient CMS 0-100% Score: 11.2  Mobility Inpatient CMS G-Code Modifier : CI    SUBJECTIVE:   Ms. Vish Feldman states, \"ok\"     Social/Functional Lives With: Alone  Type of Home: House  Bathroom Equipment: None  Home Equipment: Cane  ADL Assistance: Independent  Mode of Transportation: Car    OBJECTIVE:     PAIN: Marshall Shines / O2: PRECAUTION / Obdulia [place] / Taryn Juli:   Pre Treatment:          Post Treatment: headache 5 Vitals        Oxygen      Telemetry     RESTRICTIONS/PRECAUTIONS:                    GROSS EVALUATION: Intact Impaired (Comments):   AROM [x]  LE's   PROM []    Strength []  LE's left slightly weaker   Balance [] Sitting - Static: Good  Sitting - Dynamic: Good  Standing - Static: Good  Standing - Dynamic: Fair   Posture [] Rounded Shoulders   Sensation []  Reports decreased light touch left    Coordination []   Decreased LE   Tone []     Edema []    Activity Tolerance [] Patient Tolerated treatment well    []      COGNITION/  PERCEPTION: Intact Impaired (Comments):   Orientation [x]     Vision [x]     Hearing [x]     Cognition  [x]       MOBILITY: I Mod I S SBA CGA Min Mod Max Total  NT x2 Comments:   Bed Mobility    Rolling [] [] [] [] [] [] [] [] [] [] []    Supine to Sit [] [] [x] [] [] [] [] [] [] [] []    Scooting [] [] [x] [] [] [] [] [] [] [] []    Sit to Supine [] [] [x] [] [] [] [] [] [] [] []    Transfers    Sit to Stand [] [] [] [x] [] [] [] [] [] [] []    Bed to Chair [] [] [] [] [] [] [] [] [] [] []    Stand to Sit [] [] [] [x] [] [] [] [] [] [] []     [] [] [] [] [] [] [] [] [] [] []    I=Independent, Mod I=Modified Independent, S=Supervision, SBA=Standby Assistance, CGA=Contact Guard Assistance,   Min=Minimal Assistance, Mod=Moderate Assistance, Max=Maximal Assistance, Total=Total Assistance, NT=Not Tested    GAIT: I Mod I S SBA CGA Min Mod Max Total  NT x2 Comments:   Level of Assistance [] [] [] [x] [] [] [] [] [] [] []    Distance 15 feet    DME None    Gait Quality Decreased dixon  and Decreased step length    Weightbearing Status      Stairs      I=Independent, Mod I=Modified Independent, S=Supervision, SBA=Standby Assistance, CGA=Contact Guard Assistance,   Min=Minimal Assistance, Mod=Moderate Assistance, Max=Maximal Assistance, Total=Total Assistance, NT=Not Tested    PLAN:   FREQUENCY AND DURATION: Daily for duration of hospital stay or until stated goals are met, whichever comes first.    THERAPY PROGNOSIS: Fair    PROBLEM LIST:   (Skilled intervention is medically necessary to address:)  Decreased Activity Tolerance  Decreased AROM/PROM  Decreased Balance  Decreased Coordination  Decreased Gait Ability  Decreased Safety Awareness  Decreased Strength  Decreased Transfer Abilities  Increased Pain INTERVENTIONS PLANNED:   (Benefits and precautions of physical therapy have been discussed with the patient.)  Therapeutic Activity  Therapeutic Exercise/HEP  Gait Training  Education       TREATMENT:   EVALUATION: LOW COMPLEXITY: (Untimed Charge)    TREATMENT:   none    TREATMENT GRID:  N/A    AFTER TREATMENT PRECAUTIONS: Bed, Bed/Chair Locked, Call light within reach, Needs within reach, RN notified, and Visitors at bedside    INTERDISCIPLINARY COLLABORATION:  RN/ PCT and OT/ RANKIN    EDUCATION: Education Given To: Patient  Education Provided: Role of Therapy  Education Method: Demonstration  Education Outcome: Verbalized understanding    TIME IN/OUT:  Time In: 5728  Time Out: [address]  Minutes: [address], PT

## 2023-03-02 NOTE — CARE COORDINATION
03/02/23 0925   Service Assessment   Patient Orientation Alert and Oriented   Cognition Alert   History Provided By Patient   Primary Caregiver Self   Support Systems Friends/Neighbors   PCP Verified by CM Yes  Kate Sotomayor MD)   Prior Functional Level Independent in ADLs/IADLs   Current Functional Level Independent in ADLs/IADLs   Can patient return to prior living arrangement Yes   Ability to make needs known: Good   Family able to assist with home care needs: No   Would you like for me to discuss the discharge plan with any other family members/significant others, and if so, who? Yes  (friend Dorys Yu)   Financial Resources [employer] None   Social/Functional History   Lives With Alone   Type of [facility]   ADL Assistance Independent   Mode of Transportation Car   Discharge Planning   Type of [facility]   Current Services Prior To Admission None   Potential Assistance Purchasing Medications No   Type of Home Care Services None   Patient expects to be discharged to: [facility]   Transition of Care Consult (CM Consult) N/A     RN CM met with the patient and her friend Chang Adams at the bedside and discussed discharge planning. Per her report, she lives alone in a private residence and is independent of ADLs at baseline. She owns a cane and is currently receiving outpatient PT at [facility] in [facility]. She confirmed she has transportation home. She is pending therapy evaluations. RN CM encouraged her to ask questions. Case Management will continue to follow her for discharge planning needs.

## 2023-03-02 NOTE — PROGRESS NOTES
Hospitalist Progress Note   Admit Date:  3/2/2023  1:12 AM   Name:  Harmeet Booker   Age:  50 y.o. Sex:  female  :  1970   MRN:  [id number]   Room:  [facility]/    Presenting Complaint: No chief complaint on file. Reason(s) for Admission: Stroke-like symptoms [R29.90]     Hospital Course:   Harmeet Booker is a 50 y.o. female with medical history of MS, depression/anxiety who presented with c/o left upper ext weakness and numbness. CT head without acute findings, did show some patchy hypodensities. MRI brain ordered. Echo ordered. PT/OT consulted. Subjective & 24hr Events (23): Very tearful and anxious. Does not disclose much information. She does agree to talk to Lisette Lopez and inpatient NP with Psych will see tomorrow. Denies SI/HI, CP, SOB, n/v/d, abd pain. Assessment & Plan:     Principal Problem:   LUE weakness  MRI brain neg for CVA, does show some progression of MS plaques  Will consult Neurology, pt is followed by Dr. Tiera Mcmanus outpatient  CT head no acute findings  Echo pending  Lipid panel and A1C pending  PT/OT    Depression/Anxiety  Consult Telepsych as pt agrees, NP will see pt inpatient tomorrow  Continue home meds  Is followed by outpatient psych  Denies SI/HI    MS  See #1  Neurology consulted  Home Fingolimod    Hypokalemia  Check Mag  Replace K  BMP in AM      PT/OT evals and PPD needed/ordered? Yes  Diet:  ADULT DIET; Regular  VTE prophylaxis: Lovenox  Code status: Full Code    Hospital Problems:  Principal Problem:    Stroke-like symptoms  Resolved Problems:    * No resolved hospital problems.  *      Objective:   Patient Vitals for the past 24 hrs:   Temp Pulse Resp BP SpO2   23 1121 98.4 °F (36.9 °C) (!) 129 17 (!) 154/113 98 %   23 0747 98.2 °F (36.8 °C) (!) 116 18 (!) 152/105 98 %   23 0207 97.9 °F (36.6 °C) 96 18 (!) 153/111 100 %       Oxygen Therapy  SpO2: 98 %  O2 Device: None (Room air)    Estimated body mass index is 23.47 kg/m² as calculated from the following:    Height as of this encounter: 5' 2\" (1.575 m). Weight as of this encounter: 128 lb 4.8 oz (58.2 kg). Intake/Output Summary (Last 24 hours) at 3/2/2023 1601  Last data filed at 3/2/2023 0747  Gross per 24 hour   Intake 140 ml   Output --   Net 140 ml         Physical Exam:     Blood pressure (!) 154/113, pulse (!) 129, temperature 98.4 °F (36.9 °C), temperature source Oral, resp. rate 17, height 5' 2\" (1.575 m), weight 128 lb 4.8 oz (58.2 kg), SpO2 98 %. General:    Well nourished. Head:  Normocephalic, atraumatic  Eyes:  Sclerae appear normal.  Pupils equally round. ENT:  Nares appear normal.  Moist oral mucosa  Neck:  No restricted ROM. Trachea midline   CV:   Tachycardic. No m/r/g. No jugular venous distension. Lungs:   CTAB. No wheezing, rhonchi, or rales. Symmetric expansion. Abdomen:   Soft, nontender, nondistended. Extremities: No cyanosis or clubbing. No edema  Skin:     No rashes and normal coloration. Warm and dry. Neuro:  RUE strength 4/5, LUE strength 3/5, LLE strength 3/5, RLE strength 4/5.  +myoclonus LLE Sensation decreased LUE.  Facial expressions symmetrical  Psych:  Depressed, anxious, poor eye contact, Denies SI/HI    I have personally reviewed labs and tests:  Recent Labs:  Recent Results (from the past 48 hour(s))   EKG 12 Lead    Collection Time: 03/01/23  5:05 PM   Result Value Ref Range    Ventricular Rate 112 BPM    Atrial Rate 112 BPM    P-R Interval 118 ms    QRS Duration 72 ms    Q-T Interval 340 ms    QTc Calculation (Bazett) 464 ms    P Axis 78 degrees    R Axis 72 degrees    T Axis 75 degrees    Diagnosis Sinus tachycardia    POCT Glucose    Collection Time: 03/01/23  5:41 PM   Result Value Ref Range    POC Glucose 80 65 - 100 mg/dL    Performed by: [email or web address]    Basic Metabolic Panel    Collection Time: 03/01/23  5:42 PM   Result Value Ref Range    Sodium 139 133 - 143 mmol/L    Potassium 3.2 (L) 3.5 - 5.1 mmol/L Chloride 106 101 - 110 mmol/L    CO2 28 21 - 32 mmol/L    Anion Gap 5 2 - 11 mmol/L    Glucose 89 65 - 100 mg/dL    BUN 20 6 - 23 MG/DL    Creatinine 0.70 0.6 - 1.0 MG/DL    Est, Glom Filt Rate >60 >60 ml/min/1.73m2    Calcium 9.8 8.3 - 10.4 MG/DL   CBC with Auto Differential    Collection Time: 03/01/23  5:42 PM   Result Value Ref Range    WBC 6.0 4.3 - 11.1 K/uL    RBC 4.59 4.05 - 5.2 M/uL    Hemoglobin 14.1 11.7 - 15.4 g/dL    Hematocrit 42.4 35.8 - 46.3 %    MCV 92.4 82 - 102 FL    MCH 30.7 26.1 - 32.9 PG    MCHC 33.3 31.4 - 35.0 g/dL    RDW 12.9 11.9 - 14.6 %    Platelets 437 525 - 760 K/uL    MPV 10.0 9.4 - 12.3 FL    nRBC 0.00 0.0 - 0.2 K/uL    Differential Type AUTOMATED      Seg Neutrophils 74 43 - 78 %    Lymphocytes 14 13 - 44 %    Monocytes 10 4.0 - 12.0 %    Eosinophils % 1 0.5 - 7.8 %    Basophils 1 0.0 - 2.0 %    Immature Granulocytes 0 0.0 - 5.0 %    Segs Absolute 4.4 1.7 - 8.2 K/UL    Absolute Lymph # 0.8 0.5 - 4.6 K/UL    Absolute Mono # 0.6 0.1 - 1.3 K/UL    Absolute Eos # 0.1 0.0 - 0.8 K/UL    Basophils Absolute 0.0 0.0 - 0.2 K/UL    Absolute Immature Granulocyte 0.0 0.0 - 0.5 K/UL   Protime-INR    Collection Time: 03/01/23  5:42 PM   Result Value Ref Range    Protime 12.2 (L) 12.6 - 14.3 sec    INR 0.9     Troponin    Collection Time: 03/01/23  5:42 PM   Result Value Ref Range    Troponin, High Sensitivity 5.1 0 - 14 pg/mL   Troponin    Collection Time: 03/02/23  1:52 AM   Result Value Ref Range    Troponin, High Sensitivity 6.0 0 - 14 pg/mL   Troponin    Collection Time: 03/02/23  5:47 AM   Result Value Ref Range    Troponin, High Sensitivity 8.0 0 - 14 pg/mL   Transthoracic echocardiogram (TTE) complete with contrast, bubble, strain, and 3D PRN    Collection Time: 03/02/23 11:49 AM   Result Value Ref Range    LV EDV A2C 70 mL    LV EDV A4C 81 mL    LV ESV A2C 28 mL    LV ESV A4C 33 mL    IVSd 0.8 0.6 - 0.9 cm    LVIDd 4.2 3.9 - 5.3 cm    LVIDs 2.7 cm    LVOT Diameter 1.9 cm    LVOT Mean Gradient 3 mmHg    LVOT VTI 15.7 cm    LVOT Peak Velocity 1.1 m/s    LVOT Peak Gradient 4 mmHg    LVPWd 0.9 0.6 - 0.9 cm    LV E' Lateral Velocity 10 cm/s    LV E' Septal Velocity 7 cm/s    LV Ejection Fraction A2C 60 %    LV Ejection Fraction A4C 59 %    EF BP 59 55 - 100 %    LVOT Area 2.8 cm2    LVOT SV 44.5 ml    LA Minor Axis 4.3 cm    LA Major [place] 4.2 cm    LA Area 2C 11.8 cm2    LA Area 4C 12.7 cm2    LA Volume 2C 28 22 - 52 mL    LA Volume 4C 28 22 - 52 mL    LA Volume BP 29 22 - 52 mL    LA Diameter 2.1 cm    AV Mean Velocity 0.9 m/s    AV Mean Gradient 3 mmHg    AV VTI 19.0 cm    AV Peak Velocity 1.2 m/s    AV Peak Gradient 5 mmHg    AV Area by VTI 2.4 cm2    AV Area by Peak Velocity 2.6 cm2    Aortic Root 3.1 cm    Ascending Aorta 3.2 cm    IVC Expiration 1.3 cm    MV E Wave Deceleration Time 185.0 ms    MV A Velocity 1.13 m/s    MV E Velocity 0.84 m/s    MV Mean Velocity 0.7 m/s    MV Mean Gradient 2 mmHg    MV VTI 17.9 cm    MV Max Velocity 1.0 m/s    MV Peak Gradient 4 mmHg    MV Area by VTI 2.5 cm2    PV AT 98.0 ms    PV Max Velocity 1.3 m/s    PV Peak Gradient 7 mmHg    RV Basal Dimension 2.9 cm    RV Free Wall Peak S' 15 cm/s    TAPSE 2.0 1.7 cm    Body Surface Area 1.59 m2    Fractional Shortening 2D 36 28 - 44 %    LV ESV Index A4C 21 mL/m2    LV EDV Index A4C 51 mL/m2    LV ESV Index A2C 18 mL/m2    LV EDV Index A2C 44 mL/m2    LVIDd Index 2.66 cm/m2    LVIDs Index 1.71 cm/m2    LV RWT Ratio 0.43     LV Mass 2D 109.8 67 - 162 g    LV Mass 2D Index 69.5 43 - 95 g/m2    MV E/A 0.74     E/E' Ratio (Averaged) 10.20     E/E' Lateral 8.40     E/E' Septal 12.00     LA Volume Index BP 18 16 - 34 ml/m2    LVOT Stroke Volume Index 28.2 mL/m2    LA Volume Index 2C 18 16 - 34 mL/m2    LA Volume Index 4C 18 16 - 34 mL/m2    LA Size Index 1.33 cm/m2    LA/AO Root Ratio 0.68     Ao Root Index 1.96 cm/m2    Ascending Aorta Index 2.03 cm/m2    AV Velocity Ratio 0.92     LVOT:AV VTI Index 0.83     NARENDRA/BSA VTI 1.5 cm2/m2    NARENDRA/BSA Peak Velocity 1.6 cm2/m2    MV:LVOT VTI Index 1.14     Est. RA Pressure 3 mmHg       I have personally reviewed imaging studies:  Other Studies:  MRI brain without contrast   Final Result      No acute intracranial abnormality. Mild degree of scattered cerebral white matter signal abnormality, slightly   progressed from 2018 MRI and nonspecific but may reflect the patient's known   clinical history of multiple sclerosis and/or mild chronic white matter   microvascular ischemic changes.           Current Meds:  Current Facility-Administered Medications   Medication Dose Route Frequency    ondansetron (ZOFRAN-ODT) disintegrating tablet 4 mg  4 mg Oral Q8H PRN    Or    ondansetron (ZOFRAN) injection 4 mg  4 mg IntraVENous Q6H PRN    polyethylene glycol (GLYCOLAX) packet 17 g  17 g Oral Daily PRN    enoxaparin (LOVENOX) injection 40 mg  40 mg SubCUTAneous Daily    aspirin EC tablet 81 mg  81 mg Oral Daily    Or    aspirin suppository 300 mg  300 mg Rectal Daily    rosuvastatin (CRESTOR) tablet 40 mg  40 mg Oral Nightly    acetaminophen (TYLENOL) tablet 650 mg  650 mg Oral Q4H PRN    aluminum & magnesium hydroxide-simethicone (MAALOX) 200-200-20 MG/5ML suspension 30 mL  30 mL Oral Q6H PRN    baclofen (LIORESAL) tablet 5 mg  5 mg Oral Nightly PRN    FLUoxetine (PROZAC) capsule 80 mg  80 mg Oral Daily    clonazePAM (KLONOPIN) tablet 0.5 mg  0.5 mg Oral Nightly PRN    oxybutynin (DITROPAN-XL) extended release tablet 5 mg  5 mg Oral Daily    traZODone (DESYREL) tablet 50 mg  50 mg Oral Nightly PRN    Fingolimod (Gilenya) HCl CAPS 0.5 mg- patient supplied (Patient Supplied)  0.5 mg Oral Daily    amantadine (SYMMETREL) capsule 100 mg  100 mg Oral BID       Signed:  NIR Ford - CNP    Notes, labs, VS, diagnostic testing reviewed  Case discussed with pt, care team ,family at bedside, nursing, CM, Dr. Metzger Foil

## 2023-03-02 NOTE — PROGRESS NOTES
ACUTE OCCUPATIONAL THERAPY GOALS:   (Developed with and agreed upon by patient and/or caregiver.)  1. Patient will perform grooming with supervision. 2. Patient will perform upper body dressing with supervision. 3. Patient will perform lower body dressing with supervision. 4. Patient will perform bathing with supervision. 5. Patient will perform toileting and toilet transfer with supervision. 6. Patient will perform ADL functional mobility and tranfers in room with supervision. 7. Patient/family to demonstrate knowledge of home safety and DME recommendations. Goals to be achieved in 7 days. OCCUPATIONAL THERAPY Initial Assessment and PM       OT Visit Days: 1  Acknowledge Orders  Time  OT Charge Capture  Rehab Caseload Tracker      Jazmin Rdz is a 50 y.o. female   PRIMARY DIAGNOSIS: Stroke-like symptoms  Stroke-like symptoms [R29.90]       Reason for Referral: Generalized Muscle Weakness (M62.81)  Other lack of cordination (R27.8)  Difficulty in walking, Not elsewhere classified (R26.2)  Other abnormalities of gait and mobility (R26.89)  Observation: Payor: MEDICARE / Plan: MEDICARE PART A AND B / Product Type: *No Product type* /     ASSESSMENT:     REHAB RECOMMENDATIONS:   Recommendation to date pending progress:  Setting:  Outpatient Therapy resume out patient PT    Equipment:    None     ASSESSMENT:  Ms. Myra Schuler supine in bed visitor present. She was using her phone efficiently and agreed to therapy eval.  She reported at her baseline her R side is weaker due to her MS. She lives alone and is independent to mod I with adls. She uses a cane at times. She has been going to out patient PT for 2 years. She presents with L sided weakness prox to distal. Difficulty to assess against resistance grossly 3-/5 at shoulder and 3+/5 elbow wrist and hand. she is able to functionally use her B UE to do snaps, tie a lace and don socks. She was Supervision in and out of bed.  She donned her sock on L feet using B hands. She ambulated in the room with SBA and she has been taking herself to the restroom. Will follow for OT services in acute care, recommend patient resume out patient PT services. Visitor in the room reported he will assist her at discharge. She was set up with all her needs. Will follow.      [facility] 6 Clicks Daily Activity Inpatient Short Form:    AM-PAC Daily Activity - Inpatient   How much help is needed for putting on and taking off regular lower body clothing?: A Little  How much help is needed for bathing (which includes washing, rinsing, drying)?: A Little  How much help is needed for toileting (which includes using toilet, bedpan, or urinal)?: A Little  How much help is needed for putting on and taking off regular upper body clothing?: None  How much help is needed for taking care of personal grooming?: None  How much help for eating meals?: None  AM-[facility] Inpatient Daily Activity Raw Score: 21  AM-PAC Inpatient ADL T-Scale Score : 44.27  ADL Inpatient CMS 0-100% Score: 32.79  ADL Inpatient CMS G-Code Modifier : CJ           SUBJECTIVE:     Ms. Reno Hearing stated she gets headaches and migraines    Social/Functional Lives With: Alone  Type of Home: House  Home Access: Level entry  Bathroom Equipment: None  Home Equipment: Cane  ADL Assistance: Independent  Mode of Transportation: Car    OBJECTIVE:     Manuel Hanley / Jose Alford / Jo-Ann Gilliam: Telemetry     RESTRICTIONS/PRECAUTIONS:   fall    PAIN: Banuelos Stare / O2:   Pre Treatment:    5/10 headache      Post Treatment: 5/10 headache       Vitals          Oxygen            GROSS EVALUATION: INTACT IMPAIRED   (See Comments)   UE AROM [] []Decreased AROm at L shoulder flexion and elevation   UE PROM [] []   Strength []  L shoulder 3-/5, L elbow 3+/5, L  3+/5,R  4-/5       Posture / Balance []  Supervision to   SBA   Sensation []  L forearm and hand odd sensation   Coordination []  Able to tie  a lace, do snap, and don socks,  using phone effectively Tone []       Edema []    Activity Tolerance []    Fair with mobiltiy   Hand Dominance R [x] L []      COGNITION/  PERCEPTION: INTACT IMPAIRED   (See Comments)   Orientation [x]     Vision [x]  Reported no deficits   Hearing [x]     Cognition  [x]     Perception [x]       MOBILITY: I Mod I S SBA CGA Min Mod Max Total  NT x2 Comments:   Bed Mobility    Rolling [] [] [] [] [] [] [] [] [] [] []    Supine to Sit [] [] [x] [] [] [] [] [] [] [] []    Scooting [] [] [x] [] [] [] [] [] [] [] []    Sit to Supine [] [] [x] [] [] [] [] [] [] [] []    Transfers    Sit to Stand [] [] [] [x] [] [] [] [] [] [] []    Bed to Chair [] [] [] [] [] [] [] [] [] [] []    Stand to Sit [] [] [] [x] [] [] [] [] [] [] []    Tub/Shower [] [] [] [] [] [] [] [] [] [] []     Toilet [] [] [] [] [] [] [] [] [] [] []      [] [] [] [] [] [] [] [] [] [] []    I=Independent, Mod I=Modified Independent, S=Supervision/Setup, SBA=Standby Assistance, CGA=Contact Guard Assistance, Min=Minimal Assistance, Mod=Moderate Assistance, Max=Maximal Assistance, Total=Total Assistance, NT=Not Tested    ACTIVITIES OF DAILY LIVING: I Mod I S SBA CGA Min Mod Max Total NT Comments   BASIC ADLs:              Upper Body Bathing  [] [] [] [] [] [] [] [] [] []    Lower Body Bathing [] [] [] [] [] [] [] [] [] []    Toileting [] [] [] [] [] [] [] [] [] []    Upper Body Dressing [] [] [x] [] [] [] [] [] [] []    Lower Body Dressing [] [] [x] [] [] [] [] [] [] []    Feeding [] [] [] [] [] [] [] [] [] []    Grooming [] [] [] [] [] [] [] [] [] []    Personal Device Care [] [] [] [] [] [] [] [] [] []    Functional Mobility [] [] [] [x] [] [] [] [] [] [] No device   I=Independent, Mod I=Modified Independent, S=Supervision/Setup, SBA=Standby Assistance, CGA=Contact Guard Assistance, Min=Minimal Assistance, Mod=Moderate Assistance, Max=Maximal Assistance, Total=Total Assistance, NT=Not Tested    PLAN:   FREQUENCY/DURATION   OT Plan of Care: 2 times/week for duration of hospital stay or until stated goals are met, whichever comes first.    PROBLEM LIST:   (Skilled intervention is medically necessary to address:)  Decreased Activity Tolerance  Decreased AROM/PROM  Decreased Coordination  Decreased Strength  Increased Pain   INTERVENTIONS PLANNED:  (Benefits and precautions of occupational therapy have been discussed with the patient.)  Self Care Training  Therapeutic Activity  Therapeutic Exercise/HEP  Neuromuscular Re-education  Education         TREATMENT:     EVALUATION: LOW COMPLEXITY: (Untimed Charge)    TREATMENT:       TREATMENT GRID:  N/A    AFTER TREATMENT PRECAUTIONS: Bed, Bed/Chair Locked, Call light within reach, Needs within reach, RN notified, Side rails x3, and Visitors at bedside    INTERDISCIPLINARY COLLABORATION:  RN/ PCT, PT/ PTA, and OT/ RANKIN    EDUCATION:  Education Given To: Patient  Education Provided: Role of Therapy;Plan of Care;Transfer Training; Fall Prevention Strategies  Education Method: Demonstration;Verbal  Barriers to Learning: None  Education Outcome: Verbalized understanding;Demonstrated understanding    TOTAL TREATMENT DURATION AND TIME:  Time In: 1455  Time Out: [address]  Minutes: [address], OT

## 2023-03-02 NOTE — ED NOTES
Report given to [address] [place] [address] @ [place].       Tolu Herrera RN  03/01/23 6108

## 2023-03-02 NOTE — PROGRESS NOTES
SPEECH LANGUAGE PATHOLOGY NOTE    Attempted to see patient for bedside swallow evaluation; however, patient denies dysphagia and only took sips of thin liquid. She declined further trials and denies need for assessment. She endorses some speech difficulty, which she attributes to stress. MRI pending. Will follow up if indicated pending workup as schedule permits.        Sheri [address] CCC-SLP

## 2023-03-02 NOTE — PROGRESS NOTES
Occupational and physical Therapy Note:    Attempted to see patient this AM for occupational and physical therapy evaluation session. Patient very upset and tearful. She declined eval at this morning. Nursing aware of patient status. . Will follow and re-attempt as schedule permits/patient available.  Thank you,    Joaquina Rodríguez, OT    Rehab Caseload Tracker

## 2023-03-02 NOTE — ED NOTES
Arranged for Myra Staton to transport patient via ALS to [address] room [address].  ETA: 1:00 AM.     Justine Street  03/01/23 6505

## 2023-03-02 NOTE — H&P
Hospitalist History and Physical   Admit Date:  3/1/2023  5:28 PM   Name:  Katia Raines   Age:  50 y.o. Sex:  female  :  1970   MRN:  [id number]   Room:  [place] [address]    Presenting Complaint:  \"Pt was at PT today when left sided CP developed that radiated into the left arm. Pt also reports HA, left hand and LLE extremity numbness/tingling. Recently started on BP medication  Hx: MS \"      Reason(s) for Admission: Stroke-like symptoms [R29.90]       Assessment & Plan:     Principal Problem:    Stroke-like symptoms -29-year-old woman with long history of MS, here with left upper extremity weakness and numbness, CT head without any acute abnormalities, but did show some patchy hypodensities that could be seen with chronic small vessel ischemic changes. Radiologist recommended MRI for further evaluation. Patient has no previous history of CVA. Left upper extremity numbness and tingling may possibly be related to MS as well. Active Problems:    Chest pain -denies any history of coronary artery disease, work-up negative so far      MS (multiple sclerosis) ([facility])    Plan:   1) Admit Med BED, with remote tele, OBS status    2) TIA/CVA protocol  3) Will check Brain MRI in AM, Neurochecks, lipid panel  4) Resume home meds, Start/continue ASA & statin if not already on. 5) check  hgb a1c  6) Therapy evaluations, Stroke navigator  7) for her chest pain, will continue to trend troponins. Check echocardiogram in a.m. If labs and imaging are stable she can be referred to cardiology for outpatient stress test.            Anticipated discharge needs:     NONE    Diet: regular  VTE ppx: lovenox  Code status: FULL      History of Present Illness:   Katia Raines is a 50 y.o. female with medical history of multiple sclerosis presenting with chest pain along with headache, left arm and leg numbness tingling.   The left sided arm and leg numbness tingling started several days ago however the chest pain started today.  Some of the pain does radiate to the left arm. No history of heart problems. She does endorse a history of anorexia and says she has not been eating very well. She does have a history of multiple sclerosis on medication and says usually when she gets multiple sclerosis flareups are on the right side of the left side is new. She was at physical therapy today when they noticed she was having chest pain radiating to her left arm and had a blood pressure of 160/110. They told her to come to the emergency department. With regards to her blood pressure, valsartan was recently started December 2022 dose increase several weeks ago from 160 mg to 320. He denies any history of cardiac disease or CVA. Tonight's CT scan of the head does not show any acute abnormalities. However there are some chronic ischemic changes which were not noted on CT head 2018. She does states she has a more recent MRI brain December 2022, although hospitalist is not able to locate this. Laboratory data is benign except for potassium 3.2    Due to symptoms of chest pain and abnormal CT head, hospitalist asked to admit    Review of Systems:  10 systems reviewed and negative except as noted in HPI.     Past History:     Past Medical History:   Diagnosis Date    Autoimmune disease ([address]     multiple sclerosis    MS (multiple sclerosis) ([facility] [address] 12/5/2012    Other ill-defined conditions(799.89)     ms       Past Surgical History:   Procedure Laterality Date    COLONOSCOPY FLX DX W/COLLJ SPEC WHEN PFRMD  6/9/2010         GYN      ruptured ovarian cyst    ORTHOPEDIC SURGERY      right knee    OTHER SURGICAL HISTORY      laperoscopy        Social History     Tobacco Use    Smoking status: Never    Smokeless tobacco: Not on file   Substance Use Topics    Alcohol use: No     Alcohol/week: 0.8 standard drinks      Social History     Substance and Sexual Activity   Drug Use No       Family History   Problem Relation Age of Onset    Breast Cancer Paternal Grandmother 64    Cancer Paternal Grandmother     Cancer Paternal Grandfather     Hypertension Mother     Heart Disease Father     Cancer Paternal Aunt     Breast Cancer Paternal Aunt 52    Cancer Maternal Grandfather         Immunization History   Administered Date(s) Administered    COVID-19, PFIZER PURPLE top, DILUTE for use, (age 15 y+), 30mcg/0.3mL 01/25/2021, 02/12/2021, 08/13/2021, 04/13/2022    Influenza Virus Vaccine 02/14/2006    Pneumococcal Polysaccharide ([id number]) 07/20/2014     Allergies   Allergen Reactions    Rofecoxib Hives    Meperidine Nausea And Vomiting and Rash     Prior to Admit Medications:    From [facility] PCP notes :    Current Outpatient Medications:   amantadine (use for SYMMETREL) 100 mg capsule, Take 100 mg by mouth 2 (two) times a day, Disp: , Rfl:   baclofen (use for LIORESAL) 10 mg tablet, 1/2 tab to 1 tab prn nightly per neurologist, Disp: , Rfl:   clonazePAM (use for KlonoPIN) 0.5 mg tablet, Take 0.5 mg by mouth nightly, Disp: , Rfl:   fingolimod (GILENYA) 0.5 mg capsule, Take 0.5 mg by mouth daily, Disp: , Rfl:   FLUoxetine (use for PROzac) 20 mg capsule, Take 20 mg by mouth daily, Disp: , Rfl:   FLUoxetine (use for PROzac) 40 mg capsule, Take 40 mg by mouth daily, Disp: , Rfl:   hydrOXYzine HCL (use for ATARAX) 10 mg tablet, Take 25 mg by mouth ONLY as needed, Disp: , Rfl:   ibuprofen (use for MOTRIN) 800 mg tablet, Take 1 tablet (800 mg total) by mouth 3 (three) times a day as needed for mild pain (score 1-4) (pain), Disp: 30 tablet, Rfl: 1  magnesium salicylate (JHONY'S EXTRA STRENGTH ORAL), Take 2 capsules by mouth once as needed, Disp: , Rfl:   metoclopramide (use for REGLAN) 10 mg tablet, Take 10 mg by mouth 3 (three) times a day as needed, Disp: , Rfl:   multivit with min-folic acid 343 mcg tablet,chewable, Chew 1 tablet daily, Disp: , Rfl:   naratriptan (use for AMERGE) 2.5 mg tablet, Take at the onset of a migraine , Disp: , Rfl:   nortriptyline (use for PAMELOR) 10 mg capsule, Take 10 mg by mouth As needed, Disp: , Rfl:   oxybutynin XL (use for DITROPAN-XL) 5 mg 24 hr tablet, Take 5 mg by mouth daily, Disp: , Rfl:   topiramate (use for TOPAMAX) 100 mg tablet, Take 100 mg by mouth daily, Disp: , Rfl:   Tosymra 10 mg/actuation spray,non-aerosol, Administer 1 spray into each nostril as needed, Disp: , Rfl:   traZODone (use for DESYREL) 50 mg tablet, Take  mg by mouth nightly, Disp: , Rfl:   valsartan (use for DIOVAN) 320 mg tablet, Take 1 tablet (320 mg total) by mouth in the morning., Disp: 90 tablet, Rfl: 3          Objective:   Patient Vitals for the past 24 hrs:   Temp Pulse Resp BP SpO2   03/01/23 1912 -- 94 17 (!) 141/104 99 %   03/01/23 1830 -- (!) 105 19 (!) 151/116 97 %   03/01/23 1822 -- (!) 105 17 (!) 144/97 97 %   03/01/23 1812 -- 100 -- (!) 159/114 --   03/01/23 1810 -- (!) 101 17 (!) 159/114 --   03/01/23 1711 97.7 °F (36.5 °C) (!) 111 22 (!) 163/119 100 %       Oxygen Therapy  SpO2: 99 %    There is no height or weight on file to calculate BMI. No intake or output data in the 24 hours ending 03/01/23 2116      Physical Exam:    Blood pressure (!) 141/104, pulse 94, temperature 97.7 °F (36.5 °C), resp. rate 17, SpO2 99 %. General:    Well nourished. Thin  Alert and oriented x3. No acute distress. Head:  Normocephalic, atraumatic  Eyes:  Sclerae appear normal.  Pupils equally round. ENT:  Nares appear normal, no drainage. Moist oral mucosa  Neck:  No restricted ROM. Trachea midline   CV:   RRR. No m/r/g. No jugular venous distension. Lungs:   CTAB. No wheezing, rhonchi, or rales. Symmetric expansion. Abdomen: Bowel sounds present. Soft, nontender, nondistended. Extremities: No cyanosis or clubbing. No edema  Skin:     No rashes and normal coloration. Warm and dry. Neuro:  CN II-XII grossly intact. Sensation intact. A&Ox3. Patient has 4 out of 5 strength in all 4 extremities.   She has decreased sensation to light touch in left arm and left leg. Normal sensation in right arm right leg. Cranial nerves II through XII grossly intact   Psych:  Normal mood and affect.       I have personally reviewed labs and tests showing:  Recent Labs:  Recent Results (from the past 24 hour(s))   EKG 12 Lead    Collection Time: 03/01/23  5:05 PM   Result Value Ref Range    Ventricular Rate 112 BPM    Atrial Rate 112 BPM    P-R Interval 118 ms    QRS Duration 72 ms    Q-T Interval 340 ms    QTc Calculation (Bazett) 464 ms    P Axis 78 degrees    R Axis 72 degrees    T Axis 75 degrees    Diagnosis Sinus tachycardia    POCT Glucose    Collection Time: 03/01/23  5:41 PM   Result Value Ref Range    POC Glucose 80 65 - 100 mg/dL    Performed by: [employer]    Basic Metabolic Panel    Collection Time: 03/01/23  5:42 PM   Result Value Ref Range    Sodium 139 133 - 143 mmol/L    Potassium 3.2 (L) 3.5 - 5.1 mmol/L    Chloride 106 101 - 110 mmol/L    CO2 28 21 - 32 mmol/L    Anion Gap 5 2 - 11 mmol/L    Glucose 89 65 - 100 mg/dL    BUN 20 6 - 23 MG/DL    Creatinine 0.70 0.6 - 1.0 MG/DL    Est, Glom Filt Rate >60 >60 ml/min/1.73m2    Calcium 9.8 8.3 - 10.4 MG/DL   CBC with Auto Differential    Collection Time: 03/01/23  5:42 PM   Result Value Ref Range    WBC 6.0 4.3 - 11.1 K/uL    RBC 4.59 4.05 - 5.2 M/uL    Hemoglobin 14.1 11.7 - 15.4 g/dL    Hematocrit 42.4 35.8 - 46.3 %    MCV 92.4 82 - 102 FL    MCH 30.7 26.1 - 32.9 PG    MCHC 33.3 31.4 - 35.0 g/dL    RDW 12.9 11.9 - 14.6 %    Platelets 364 332 - 403 K/uL    MPV 10.0 9.4 - 12.3 FL    nRBC 0.00 0.0 - 0.2 K/uL    Differential Type AUTOMATED      Seg Neutrophils 74 43 - 78 %    Lymphocytes 14 13 - 44 %    Monocytes 10 4.0 - 12.0 %    Eosinophils % 1 0.5 - 7.8 %    Basophils 1 0.0 - 2.0 %    Immature Granulocytes 0 0.0 - 5.0 %    Segs Absolute 4.4 1.7 - 8.2 K/UL    Absolute Lymph # 0.8 0.5 - 4.6 K/UL    Absolute Mono # 0.6 0.1 - 1.3 K/UL    Absolute Eos # 0.1 0.0 - 0.8 K/UL    Basophils Absolute 0.0 0.0 - 0.2 K/UL Absolute Immature Granulocyte 0.0 0.0 - 0.5 K/UL   Protime-INR    Collection Time: 03/01/23  5:42 PM   Result Value Ref Range    Protime 12.2 (L) 12.6 - 14.3 sec    INR 0.9     Troponin    Collection Time: 03/01/23  5:42 PM   Result Value Ref Range    Troponin, High Sensitivity 5.1 0 - 14 pg/mL       I have personally reviewed imaging studies showing:  CT HEAD WO CONTRAST    Result Date: 3/1/2023  EXAMINATION: CT HEAD WO CONTRAST DATE: 3/1/2023 7:30 PM  INDICATION: Stroke, headache, left arm and leg numbness  COMPARISON: Head CT from 8/24/2018. TECHNIQUE: Thin section noncontrast axial images were obtained through the head. Coronal reformats were created. CT dose lowering techniques were used, to include: automated exposure control, adjustment for patient size, and or use of iterative reconstruction. FINDINGS:  Intracranial contents: No acute intracranial hemorrhage, evidence of acute territorial infarct, mass, mass effect or hydrocephalus. Moderate patchy hypodensity throughout the white matter. Trace intracranial atherosclerosis. Cerebral volume is normal. Bones and extracranial soft tissues:  No fracture or focal osseous lesion in the calvarium or skull base. Paranasal sinuses are clear where visualized. Mastoid air cells and middle ear cavities are clear bilaterally. No acute findings in the orbits. 1. No acute intracranial abnormality visible by CT 2. Moderate patchy hypodensity throughout the white matter, not present on a prior scan from 2018. This finding can be seen in the setting of chronic small vessel ischemic changes, but would expect to have at least some degree of this finding in 2018, and the patient is also a young for this finding unless there is a strong history of cardiovascular disease. Given the provided history, consider noncontrast brain MRI for further evaluation. This examination was interpreted by Nely Magana M.D.   Nely Magana M.D. 3/1/2023 8:37:00 PM    CTA CHEST ABDOMEN PELVIS W WO CONTRAST    Result Date: 3/1/2023  EXAM: CT ANGIOGRAM OF THE CHEST, ABDOMEN AND PELVIS WITH IV CONTRAST INDICATIONS: Chest pain, extremity numbness TECHNIQUE: CT of the chest, abdomen and pelvis was performed following the administration of 100 mL Isovue-370 intravenous contrast. CT dose lowering techniques were used, to include: automated exposure control, adjustment for patient size, and / or use of iterative reconstruction. MIP images were generated. COMPARISON: No prior  CTs are available. FINDINGS: CHEST: Vasculature: There is no evidence of aortic dissection or acute aortic syndrome. There is fusiform aneurysmal dilatation of the ascending aorta measuring 4.1 cm  in diameter. No pulmonary arterial filling defect is identified. Mediastinum / Pleura: There is no pericardial or pleural effusion. There is no mediastinal, hilar or axillary lymphadenopathy. Airways / Lungs: The central airways are patent. There is no pneumothorax or consolidative parenchymal disease. ABDOMEN: Liver: Normal angiographic phase enhancement pattern. Gallbladder and Bile Ducts: Unremarkable CT appearance of the gallbladder. There  is no intrahepatic or extrahepatic biliary ductal dilatation. Spleen: Normal heterogeneous angiographic phase enhancement pattern. Pancreas: The pancreatic parenchyma is unremarkable. There is no pancreatic ductal dilatation. Adrenals: Unremarkable without nodularity. Kidneys: There is symmetric enhancement without hydronephrosis. No focal lesion is demonstrated. Vasculature: No evidence of atherosclerosis or aneurysm. Nodes: There is no lymphadenopathy by size criteria. Bowel: The stomach and visualized loops of large and small bowel are unremarkable. An unremarkable appendix is identified. Mesentery/Peritoneum: A fat-containing umbilical hernia is present. Soft Tissues: Unremarkable PELVIS: Pelvic Organs: There is no abnormal pelvic mass. The urinary bladder is unremarkable.  Bones: No destructive osseous lesions. 1. No evidence of aortic dissection or acute aortic syndrome. No evidence of pulmonary embolism. 2. There is otherwise no evidence of acute disease in the chest, abdomen or pelvis, accounting for the limitations of the angiographic technique. 3. Fusiform ascending aortic aneurysm measuring 4.1 cm in diameter. 4. Fat-containing umbilical hernia. Twyla Rehman M.D. 3/1/2023 8:33:00 PM      Echocardiogram:  No results found for this or any previous visit. Orders Placed This Encounter   Medications    aspirin tablet 325 mg    nitroGLYCERIN (NITROSTAT) SL tablet 0.4 mg    0.9 % sodium chloride bolus    0.9 % sodium chloride bolus    sodium chloride flush 0.9 % injection 10 mL    iopamidol (ISOVUE-370) 76 % injection 100 mL         Signed:  Karla Ferreira MD    Part of this note may have been written by using a voice dictation software. The note has been proof read but may still contain some grammatical/other typographical errors.

## 2023-03-02 NOTE — PLAN OF CARE
Problem: Discharge Planning  Goal: Discharge to home or other facility with appropriate resources  Outcome: Progressing     Problem: Safety - Adult  Goal: Free from fall injury  Outcome: Progressing

## 2023-03-02 NOTE — CONSULTS
Consult    Patient: Antony Malloy MRN: [id number]     YOB: 1970  Age: 50 y.o. Sex: female      Subjective:      Antony Malloy is a 50 y.o. female who is being seen for multiple sclerosis. The patient states that she carries a diagnosis of multiple sclerosis and follows with a neurologist as an outpatient. She is on Gilenya. She is also on high doses of vitamin D. The patient reports left-sided weakness, high blood pressure, and depression/anxiety. MRI of the brain shows somewhat nonspecific white matter lesions which have slightly progressed since last MRI that we have on file. Overall, she states that her symptoms are relatively mild and she feels well today and would like to go home.   There are no contrast-enhancing lesions on MRI    Past Medical History:   Diagnosis Date    Autoimmune disease ([facility] [address]     multiple sclerosis    MS (multiple sclerosis) ([facility] [address] 12/5/2012    Other ill-defined conditions(799.89)     ms     Past Surgical History:   Procedure Laterality Date    COLONOSCOPY FLX DX W/COLLJ SPEC WHEN PFRMD  6/9/2010         GYN      ruptured ovarian cyst    ORTHOPEDIC SURGERY      right knee    OTHER SURGICAL HISTORY      laperoscopy      Family History   Problem Relation Age of Onset    Breast Cancer Paternal Grandmother 64    Cancer Paternal Grandmother     Cancer Paternal Grandfather     Hypertension Mother     Heart Disease Father     Cancer Paternal Aunt     Breast Cancer Paternal Aunt 52    Cancer Maternal Grandfather      Social History     Tobacco Use    Smoking status: Never    Smokeless tobacco: Not on file   Substance Use Topics    Alcohol use: No     Alcohol/week: 0.8 standard drinks      Current Facility-Administered Medications   Medication Dose Route Frequency Provider Last Rate Last Admin    ondansetron (ZOFRAN-ODT) disintegrating tablet 4 mg  4 mg Oral Q8H PRN Mike Richardson MD        Or    ondansetron [facility]) injection 4 mg  4 mg IntraVENous Q6H PRN Nikki GAMING Adore Lyon MD        polyethylene glycol [facility]) packet 17 g  17 g Oral Daily PRN Portia Dunne MD        enoxaparin (LOVENOX) injection 40 mg  40 mg SubCUTAneous Daily Portia Dunne MD   40 mg at 03/02/23 5220    aspirin EC tablet 81 mg  81 mg Oral Daily Portia Dunne MD   81 mg at 03/02/23 3067    Or    aspirin suppository 300 mg  300 mg Rectal Daily Portia Dunne MD        rosuvastatin (CRESTOR) tablet 40 mg  40 mg Oral Nightly Portia Dunne MD        acetaminophen (TYLENOL) tablet 650 mg  650 mg Oral Q4H PRN Portia Dunne MD   650 mg at 03/02/23 1719    aluminum & magnesium hydroxide-simethicone (MAALOX) 200-200-20 MG/5ML suspension 30 mL  30 mL Oral Q6H PRN Portia Dunne MD        baclofen (LIORESAL) tablet 5 mg  5 mg Oral Nightly PRN Portia Dunne MD        FLUoxetine (PROZAC) capsule 80 mg  80 mg Oral Daily Portia Dunne MD   80 mg at 03/02/23 5019    clonazePAM (KLONOPIN) tablet 0.5 mg  0.5 mg Oral Nightly PRN Portia Dunne MD        oxybutynin (DITROPAN-XL) extended release tablet 5 mg  5 mg Oral Daily Portia Dunne MD   5 mg at 03/02/23 3750    traZODone (DESYREL) tablet 50 mg  50 mg Oral Nightly PRN Portia Dunne MD        Fingolimod (Gilenya) HCl CAPS 0.5 mg- patient supplied (Patient Supplied)  0.5 mg Oral Daily Portia Dunne MD   0.5 mg at 03/02/23 0912    amantadine (SYMMETREL) capsule 100 mg  100 mg Oral BID Portia Dunne MD   100 mg at 03/02/23 0907        Allergies   Allergen Reactions    Rofecoxib Hives    Meperidine Nausea And Vomiting and Rash       Review of Systems:  CONSTITUTIONAL: No weight loss, fever, chills, but positive weakness and fatigue   HEENT: Eyes: No visual loss, blurred vision, double vision or yellow sclerae. Ears, Nose, Throat: No hearing loss, sneezing, congestion, runny nose or sore throat. SKIN: No rash or itching. CARDIOVASCULAR: No chest pain, chest pressure or chest discomfort. No palpitations or edema.   RESPIRATORY: No shortness of breath, cough or sputum. GASTROINTESTINAL: No anorexia, nausea, vomiting or diarrhea. No abdominal pain or blood. GENITOURINARY: no burning with urination. NEUROLOGICAL: No headache, dizziness, syncope, paralysis, ataxia, numbness or tingling in the extremities. No change in bowel or bladder control. MUSCULOSKELETAL: No muscle, back pain, joint pain or stiffness. HEMATOLOGIC: No anemia, bleeding or bruising. LYMPHATICS: No enlarged nodes. No history of splenectomy. PSYCHIATRIC: Positive depression and anxiety  ENDOCRINOLOGIC: No reports of sweating, cold or heat intolerance. No polyuria or polydipsia. ALLERGIES: No history of asthma, hives, eczema or rhinitis. Objective:     Vitals:    03/02/23 0207 03/02/23 0747 03/02/23 1121 03/02/23 1604   BP: (!) 153/111 (!) 152/105 (!) 154/113 (!) 130/99   Pulse: 96 (!) 116 (!) 129 84   Resp: 18 18 17 17   Temp: 97.9 °F (36.6 °C) 98.2 °F (36.8 °C) 98.4 °F (36.9 °C) 98.2 °F (36.8 °C)   TempSrc: Oral Oral Oral Oral   SpO2: 100% 98% 98% 97%   Weight:       Height:            Physical Exam:  General - Well developed, well nourished, in no apparent distress. Pleasant and conversant. HEENT - Normocephalic, atraumatic. Neck -No masses   Lungs - Normal work of breathing   Abdomen - No masses   Extremities - No edema and no rashes. Neurological examination - Comprehension, attention , memory and reasoning are intact. Language and speech are normal. On cranial nerve examination pupils are equal round and reactive to light (cranial nerve II and III). Visual acuity is adequate (cranial nerve II). Visual fields are full to finger confrontation (CN II). Extraocular motility is normal (CN III, IV, VI). Face is symmetric (CN VII). Hearing is grossly intact to verbal communication (CN VIII). Motor examination - There is normal bulk. Power is full throughout (with spontaneous movement against environmental objects). Cerebellar examination is normal with finger-no-nose testing.  Gait and stance are normal.     NIHSS   NIHSS Score:   1a-Level of Consciousness 0  1b-What is Month/Age 0  1c-Open/Close Eyes&Hand 0  2 -Best Gaze 0  3 -Visual Fields 0  4 -Facial Palsy 0  5a-Motor-Left Arm 0  5b-Motor-Right Arm 0  6a-Motor-Left Leg 0  6b-Motor-Right Leg 0  7 -Limb Ataxia 0  8 -Sensory 0  9 -Best Language 0  10-Dysarthria 0  11-Extinction/Inattention 0      CT Results (most recent): Personally Reviewed   Results for orders placed during the hospital encounter of 03/01/23    CT HEAD WO CONTRAST    Narrative  EXAMINATION: CT HEAD WO CONTRAST    DATE: 3/1/2023 7:30 PM    INDICATION: Stroke, headache, left arm and leg numbness    COMPARISON: Head CT from 8/24/2018. TECHNIQUE: Thin section noncontrast axial images were obtained through the head. Coronal reformats were created. CT dose lowering techniques were used, to  include: automated exposure control, adjustment for patient size, and or use of  iterative reconstruction. FINDINGS:    Intracranial contents:    No acute intracranial hemorrhage, evidence of acute territorial infarct, mass,  mass effect or hydrocephalus. Moderate patchy hypodensity throughout the white  matter. Trace intracranial atherosclerosis. Cerebral volume is normal.    Bones and extracranial soft tissues:    No fracture or focal osseous lesion in the calvarium or skull base. Paranasal  sinuses are clear where visualized. Mastoid air cells and middle ear cavities  are clear bilaterally. No acute findings in the orbits. Impression  1. No acute intracranial abnormality visible by CT  2. Moderate patchy hypodensity throughout the white matter, not present on a  prior scan from 2018. This finding can be seen in the setting of chronic small  vessel ischemic changes, but would expect to have at least some degree of this  finding in 2018, and the patient is also a young for this finding unless there  is a strong history of cardiovascular disease.  Given the provided history,  consider noncontrast brain MRI for further evaluation. This examination was interpreted by Radha Miller M.D. Radha Miller M.D.  3/1/2023 8:37:00 PM       Most recent Echo  Results for orders placed during the hospital encounter of 03/02/23    Transthoracic echocardiogram (TTE) complete with contrast, bubble, strain, and 3D PRN    Interpretation Summary    Left Ventricle: Normal left ventricular systolic function with a visually estimated EF of 60 - 65%. Left ventricle size is normal. Mildly increased wall thickness. Normal wall motion. Normal diastolic function. Technical qualifiers: Color flow Doppler was performed and pulse wave and/or continuous wave Doppler was performed. MRI Results (most recent): Personally Reviewed   Results for orders placed during the hospital encounter of 03/02/23    MRI brain without contrast    Narrative  EXAMINATION: MRI BRAIN WO CONTRAST 3/2/2023 1:21 PM    ACCESSION NUMBER: [id number]    COMPARISON: CT head 3/1/2023. MRI brain 8/26/2018. INDICATION: 61-year-old female with left upper extremity weakness, history of  multiple sclerosis, abnormal head CT. TECHNIQUE: Multiplanar multisequence MRI of the brain without the administration  of intravenous contrast.    FINDINGS:    No midline shift. There is a mild degree of patchy and scattered foci of  T2/FLAIR hyperintensity within the subcortical and periventricular white matter,  nonspecific and slightly progressed from 2018 MRI. No evidence of acute  intracranial hemorrhage or acute infarct. No cerebral or cerebellar mass. No  diffusion weighted signal abnormality is seen. Ventricles are within normal  limits in size and configuration. No extra-axial fluid collections. Impression  No acute intracranial abnormality.     Mild degree of scattered cerebral white matter signal abnormality, slightly  progressed from 2018 MRI and nonspecific but may reflect the patient's known  clinical history of multiple sclerosis and/or mild chronic white matter  microvascular ischemic changes. Assessment and Plan  57-year-old woman with a reported history of multiple sclerosis who presents with left-sided weakness which has improved. There are no contrast-enhancing lesions on MRI of the brain and no infarcts. There is slight progression in white matter disease since last MRI. She should follow-up with her neurologist about these interval changes. She should continue vitamin D supplementation. Otherwise, no further recommendations. I do think aspirin 81 mg daily is a good idea considering that chronic microangiopathic changes are on the differential diagnosis for her white matter abnormalities. In addition, aspirin has been shown to decrease the risk of aneurysmal growth, at least in the brain. No further recommendations.   Neurology will sign off

## 2023-03-03 VITALS
WEIGHT: 128.3 LBS | DIASTOLIC BLOOD PRESSURE: 89 MMHG | HEART RATE: 93 BPM | SYSTOLIC BLOOD PRESSURE: 132 MMHG | OXYGEN SATURATION: 99 % | TEMPERATURE: 97.5 F | RESPIRATION RATE: 18 BRPM | HEIGHT: 62 IN | BODY MASS INDEX: 23.61 KG/M2

## 2023-03-03 PROBLEM — F41.8 ANXIETY ABOUT HEALTH: Status: ACTIVE | Noted: 2023-03-03

## 2023-03-03 PROBLEM — F41.9 ANXIETY AND DEPRESSION: Status: ACTIVE | Noted: 2023-03-03

## 2023-03-03 PROBLEM — R45.89 ANXIETY ABOUT HEALTH: Status: ACTIVE | Noted: 2023-03-03

## 2023-03-03 PROBLEM — F43.21 GRIEF: Status: ACTIVE | Noted: 2023-03-03

## 2023-03-03 PROBLEM — F32.A ANXIETY AND DEPRESSION: Status: ACTIVE | Noted: 2023-03-03

## 2023-03-03 PROBLEM — Z86.59 HISTORY OF ANOREXIA NERVOSA: Status: ACTIVE | Noted: 2023-03-03

## 2023-03-03 LAB
ANION GAP SERPL CALC-SCNC: 7 MMOL/L (ref 2–11)
BUN SERPL-MCNC: 11 MG/DL (ref 6–23)
CALCIUM SERPL-MCNC: 8.6 MG/DL (ref 8.3–10.4)
CHLORIDE SERPL-SCNC: 111 MMOL/L (ref 101–110)
CHOLEST SERPL-MCNC: 231 MG/DL
CO2 SERPL-SCNC: 23 MMOL/L (ref 21–32)
CREAT SERPL-MCNC: 0.59 MG/DL (ref 0.6–1)
ERYTHROCYTE [DISTWIDTH] IN BLOOD BY AUTOMATED COUNT: 12.4 % (ref 11.9–14.6)
EST. AVERAGE GLUCOSE BLD GHB EST-MCNC: 88 MG/DL
GLUCOSE SERPL-MCNC: 91 MG/DL (ref 65–100)
HBA1C MFR BLD: 4.7 % (ref 4.8–5.6)
HCT VFR BLD AUTO: 39.9 % (ref 35.8–46.3)
HDLC SERPL-MCNC: 82 MG/DL (ref 40–60)
HDLC SERPL: 2.8
HGB BLD-MCNC: 13.2 G/DL (ref 11.7–15.4)
LDLC SERPL CALC-MCNC: 134.6 MG/DL
MAGNESIUM SERPL-MCNC: 2.2 MG/DL (ref 1.8–2.4)
MCH RBC QN AUTO: 30.1 PG (ref 26.1–32.9)
MCHC RBC AUTO-ENTMCNC: 33.1 G/DL (ref 31.4–35)
MCV RBC AUTO: 90.9 FL (ref 82–102)
NRBC # BLD: 0 K/UL (ref 0–0.2)
PLATELET # BLD AUTO: 304 K/UL (ref 150–450)
PMV BLD AUTO: 9.5 FL (ref 9.4–12.3)
POTASSIUM SERPL-SCNC: 3.4 MMOL/L (ref 3.5–5.1)
RBC # BLD AUTO: 4.39 M/UL (ref 4.05–5.2)
SODIUM SERPL-SCNC: 141 MMOL/L (ref 133–143)
TRIGL SERPL-MCNC: 72 MG/DL (ref 35–150)
VLDLC SERPL CALC-MCNC: 14.4 MG/DL (ref 6–23)
WBC # BLD AUTO: 2.5 K/UL (ref 4.3–11.1)

## 2023-03-03 PROCEDURE — 6370000000 HC RX 637 (ALT 250 FOR IP): Performed by: HOSPITALIST

## 2023-03-03 PROCEDURE — 92610 EVALUATE SWALLOWING FUNCTION: CPT

## 2023-03-03 PROCEDURE — G0378 HOSPITAL OBSERVATION PER HR: HCPCS

## 2023-03-03 PROCEDURE — 80048 BASIC METABOLIC PNL TOTAL CA: CPT

## 2023-03-03 PROCEDURE — 36415 COLL VENOUS BLD VENIPUNCTURE: CPT

## 2023-03-03 PROCEDURE — 83735 ASSAY OF MAGNESIUM: CPT

## 2023-03-03 PROCEDURE — 83036 HEMOGLOBIN GLYCOSYLATED A1C: CPT

## 2023-03-03 PROCEDURE — 80061 LIPID PANEL: CPT

## 2023-03-03 PROCEDURE — 85027 COMPLETE CBC AUTOMATED: CPT

## 2023-03-03 PROCEDURE — 6360000002 HC RX W HCPCS: Performed by: HOSPITALIST

## 2023-03-03 RX ORDER — ASPIRIN 81 MG/1
81 TABLET ORAL DAILY
Qty: 30 TABLET | Refills: 0 | Status: SHIPPED | OUTPATIENT
Start: 2023-03-04

## 2023-03-03 RX ORDER — HYDROMORPHONE HYDROCHLORIDE 1 MG/ML
1 INJECTION, SOLUTION INTRAMUSCULAR; INTRAVENOUS; SUBCUTANEOUS
Status: DISCONTINUED | OUTPATIENT
Start: 2023-03-03 | End: 2023-03-03

## 2023-03-03 RX ORDER — ROSUVASTATIN CALCIUM 40 MG/1
40 TABLET, COATED ORAL NIGHTLY
Qty: 30 TABLET | Refills: 0 | Status: SHIPPED | OUTPATIENT
Start: 2023-03-03

## 2023-03-03 RX ADMIN — ENOXAPARIN SODIUM 40 MG: 100 INJECTION SUBCUTANEOUS at 09:42

## 2023-03-03 RX ADMIN — OXYBUTYNIN CHLORIDE 5 MG: 5 TABLET, EXTENDED RELEASE ORAL at 09:39

## 2023-03-03 RX ADMIN — AMANTADINE HYDROCHLORIDE 100 MG: 100 CAPSULE ORAL at 09:39

## 2023-03-03 RX ADMIN — ASPIRIN 81 MG: 81 TABLET, COATED ORAL at 09:40

## 2023-03-03 RX ADMIN — FINGOLIMOD HYDROCHLORIDE 0.5 MG: 0.5 CAPSULE ORAL at 09:39

## 2023-03-03 RX ADMIN — FLUOXETINE 80 MG: 20 CAPSULE ORAL at 09:40

## 2023-03-03 ASSESSMENT — ANXIETY QUESTIONNAIRES
4. TROUBLE RELAXING: 1
GAD7 TOTAL SCORE: 6
5. BEING SO RESTLESS THAT IT IS HARD TO SIT STILL: 1
1. FEELING NERVOUS, ANXIOUS, OR ON EDGE: 1
3. WORRYING TOO MUCH ABOUT DIFFERENT THINGS: 1
7. FEELING AFRAID AS IF SOMETHING AWFUL MIGHT HAPPEN: 1
6. BECOMING EASILY ANNOYED OR IRRITABLE: 0
2. NOT BEING ABLE TO STOP OR CONTROL WORRYING: 1

## 2023-03-03 ASSESSMENT — PATIENT HEALTH QUESTIONNAIRE - PHQ9
SUM OF ALL RESPONSES TO PHQ QUESTIONS 1-9: 5
SUM OF ALL RESPONSES TO PHQ9 QUESTIONS 1 & 2: 1
8. MOVING OR SPEAKING SO SLOWLY THAT OTHER PEOPLE COULD HAVE NOTICED. OR THE OPPOSITE, BEING SO FIGETY OR RESTLESS THAT YOU HAVE BEEN MOVING AROUND A LOT MORE THAN USUAL: 0
5. POOR APPETITE OR OVEREATING: 2
3. TROUBLE FALLING OR STAYING ASLEEP: 1
9. THOUGHTS THAT YOU WOULD BE BETTER OFF DEAD, OR OF HURTING YOURSELF: 0
4. FEELING TIRED OR HAVING LITTLE ENERGY: 0
SUM OF ALL RESPONSES TO PHQ QUESTIONS 1-9: 5
SUM OF ALL RESPONSES TO PHQ QUESTIONS 1-9: 5
1. LITTLE INTEREST OR PLEASURE IN DOING THINGS: 0
2. FEELING DOWN, DEPRESSED OR HOPELESS: 1
7. TROUBLE CONCENTRATING ON THINGS, SUCH AS READING THE NEWSPAPER OR WATCHING TELEVISION: 0
SUM OF ALL RESPONSES TO PHQ QUESTIONS 1-9: 5
6. FEELING BAD ABOUT YOURSELF - OR THAT YOU ARE A FAILURE OR HAVE LET YOURSELF OR YOUR FAMILY DOWN: 1

## 2023-03-03 NOTE — CARE COORDINATION
Discharge planning was discussed with the Interdisciplinary Team. Per the attending NP, the patient is a potential discharge today. No case management needs are anticipated. The patient has been evaluated by therapy services.

## 2023-03-03 NOTE — PROGRESS NOTES
SPEECH LANGUAGE PATHOLOGY: DYSPHAGIA  Initial Assessment and Discharge    NAME: Tracie Garcia  : 1970  MRN: [id number]    ADMISSION DATE: 3/2/2023  PRIMARY DIAGNOSIS: Stroke-like symptoms  Stroke-like symptoms [R29.90]    ICD-10: Treatment Diagnosis: R13.12 Dysphagia, Oropharyngeal Phase    RECOMMENDATIONS   Diet:  Diet Solids Recommendation: Regular  Liquid Consistency Recommendation: Thin    Medications: PO     Patient continues to require skilled intervention: No  D/C Recommendations: No follow up therapy recommended post discharge     ASSESSMENT    Dysphagia Diagnosis: Swallow function appears WFL  Dysphagia Impression : No swallowing issues identified  Patient presents with oral and pharyngeal phase of swallow within functional limits with no overt signs or symptoms of aspiration observed with any consistency assessed. Swallows of all textures timely, clear to cervical auscultation and with adequate laryngeal excursion. Voice clear after swallow. No oral residue observed. Conversational speech clear and appropriate. Recommend continue current regular texture diet and thin liquids. Give meds with water. No further skilled speech/swallow intervention currently indicated. GENERAL    History of Present Injury/Illness: Ms. Edith Marie  has a past medical history of Autoimmune disease ([address] MS (multiple sclerosis) ([facility] [address] and Other ill-defined conditions(799.89). . She also  has a past surgical history that includes other surgical history; gyn; colonoscopy flx dx w/collj spec when pfrmd (2010); and orthopedic surgery. Chart Reviewed: Yes  Subjective: NP present throughout assessment, but patient willing to participate. Behavior/Cognition: Alert; Cooperative;Pleasant mood  Communication Observation: Functional  Follows Directions: Complex    Prior Dysphagia History: None reported     Current Diet : Regular  Current Liquid Diet : Thin    Respiratory Status: Room air              Pain:   Patient does not c/o pain                                        OBJECTIVE    Patient Positioning: Upright in bed   Oral Motor   Labial: No impairment  Dentition: Natural  Oral Hygiene: Moist;Clean  Lingual: No impairment  Dentition: Adequate     Volitional Cough: Strong  Baseline Vocal Quality: Normal    Oropharyngeal Phase:  Regular;Thin;Pureed;Mixed Consistencies  Assessment Method(s): Cervical auscultation;Observation;Palpation  Vocal Quality: No Impairment  Bolus Acceptance: No impairment  Bolus Formation/Control: No impairment  Propulsion: No impairment  Oral Residue: None  Initiation of Swallow: No impairment  Laryngeal Elevation: Functional  Aspiration Signs/Symptoms: None  Pharyngeal Phase Characteristics: No impairment, issues, or problems              PLAN    Duration/Frequency: No treatment indicated at this time    Dysphagia Outcome and Severity Scale (KHADAR)  Dysphagia Outcome Severity Scale: Level 7: Normal in all situations  Interpretation of Tool: The Dysphagia Outcome and Severity Scale (KHADAR) is a simple, easy-to-use, 7-point scale developed to systematically rate the functional severity of dysphagia based on objective assessment and make recommendations for diet level, independence level, and type of nutrition.    Normal(7), Functional(6), Mild(5), Mild-Moderate(4), Moderate(3), Moderate-Severe(2), Severe(1)         Education: Patient  Patient Education: Role of SLP; results/recommendations  Patient Education Response: Verbalizes understanding    PRECAUTIONS/ALLERGIES: Rofecoxib and Meperidine   Safety Devices in place: Yes        Therapy Time  SLP Individual Minutes  Time In: 2599  Time Out: 8497  Minutes: [id number], [place]  3/3/2023 11:18 AM

## 2023-03-03 NOTE — DISCHARGE SUMMARY
Hospitalist Discharge Summary   Admit Date:  3/2/2023  1:12 AM   DC Note date: 3/3/2023  Name:  Feroz Marsh   Age:  50 y.o. Sex:  female  :  1970   MRN:  [id number]   Room:  [facility]  PCP:  Unknown Unknown    Presenting Complaint: No chief complaint on file. Initial Admission Diagnosis: Stroke-like symptoms [R29.90]     Problem List for this Hospitalization (present on admission):    Principal Problem:    Stroke-like symptoms  Resolved Problems:    * No resolved hospital problems. [facility] Course:    Hospital Course:   Feroz Mrash is a 50 y.o. female with medical history of MS, depression/anxiety who presented with c/o left upper ext weakness and numbness. CT head without acute findings, did show some patchy hypodensities. MRI brain neg for CVA, does show some progression of MS plaques. Neurology consulted, no further recs. Echo without PFO, EF 60-65%. A1C 4.7, lipids elevated. Started on ASA, statin. Telepsych consulted due to depression, pt followed by outpatient psych, recs to continue home meds and f/u with her psychiatrist.  Denies SI/HI. She had onset of right upper ext drift during nursing report this morning. This is not new therefore a Code S was not called. Pt is at her baseline. Denies CP, SOB, n/v/d, abd pain. Disposition: home  Diet: ADULT DIET; Regular  Code Status: Full Code    Follow Ups:   Follow-up Information     Karmen Sanchez MD Follow up. Specialty: Neurology  Contact information:  [address] [place] [id number] [phone]                       Time spent in patient discharge and coordination 45 minutes. Follow up labs/diagnostics (ultimately defer to outpatient provider):      Plan was discussed with pt. All questions answered. Patient was stable at time of discharge. Instructions given to call a physician or return if any concerns.     Current Discharge Medication List        START taking these medications    Details aspirin 81 MG EC tablet Take 1 tablet by mouth daily  Qty: 30 tablet, Refills: 0      rosuvastatin (CRESTOR) 40 MG tablet Take 1 tablet by mouth nightly  Qty: 30 tablet, Refills: 0           CONTINUE these medications which have NOT CHANGED    Details   amantadine (SYMMETREL) 100 MG capsule Take 100 mg by mouth 2 times daily      baclofen (LIORESAL) 10 MG tablet Take 5-10 mg by mouth nightly as needed      clonazePAM (KLONOPIN) 0.5 MG tablet Take 0.5 mg by mouth nightly as needed. Fingolimod HCl 0.5 MG CAPS Take 0.5 mg by mouth daily      FLUoxetine (PROZAC) 20 MG capsule Take 80 mg by mouth daily      hydrOXYzine HCl (ATARAX) 10 MG tablet Take 25 mg by mouth every 8 hours as needed for Itching      metoclopramide (REGLAN) 10 MG tablet Take 10 mg by mouth 3 times daily as needed      naratriptan (AMERGE) 2.5 MG tablet Take 2.5 mg by mouth once as needed for Migraine 2.5 mg at onset of headache, may repeat in 4 hours if needed      nortriptyline (PAMELOR) 10 MG capsule Take 10 mg by mouth daily as needed      oxybutynin (DITROPAN-XL) 5 MG extended release tablet Take 5 mg by mouth daily      topiramate (TOPAMAX) 100 MG tablet Take 100 mg by mouth daily      SUMAtriptan (TOSYMRA) 10 MG/ACT SOLN 1 spray by Nasal route as needed      traZODone (DESYREL) 50 MG tablet Take  mg by mouth nightly      valsartan (DIOVAN) 320 MG tablet Take 320 mg by mouth daily           STOP taking these medications       ibuprofen (ADVIL;MOTRIN) 800 MG tablet Comments:   Reason for Stopping:               Some medications may have been reported old/obsolete and marked \"stop taking\" by the system; in reality pt was already off these meds; defer to outpatient or prescribing providers.     Procedures done this admission:  * No surgery found *    Consults this admission:  IP CONSULT TO PSYCHIATRY  IP CONSULT TO NEUROLOGY    Echocardiogram results:  03/02/23    TRANSTHORACIC ECHOCARDIOGRAM (TTE) COMPLETE (CONTRAST/BUBBLE/3D PRN) 03/02/2023  4:11 PM, 03/02/2023 12:00 AM (Final)    Interpretation Summary    Left Ventricle: Normal left ventricular systolic function with a visually estimated EF of 60 - 65%. Left ventricle size is normal. Mildly increased wall thickness. Normal wall motion. Normal diastolic function. Technical qualifiers: Color flow Doppler was performed and pulse wave and/or continuous wave Doppler was performed. Signed by: Bayron Saba MD on 3/2/2023  4:11 PM, Signed by: Unknown Provider Result on 3/2/2023 12:00 AM      Diagnostic Imaging/Tests:   CT HEAD WO CONTRAST    Result Date: 3/1/2023  1. No acute intracranial abnormality visible by CT 2. Moderate patchy hypodensity throughout the white matter, not present on a prior scan from 2018. This finding can be seen in the setting of chronic small vessel ischemic changes, but would expect to have at least some degree of this finding in 2018, and the patient is also a young for this finding unless there is a strong history of cardiovascular disease. Given the provided history, consider noncontrast brain MRI for further evaluation. This examination was interpreted by Tadeo Orozco M.D. Tadeo Orozco M.D. 3/1/2023 8:37:00 PM    MRI brain without contrast    Result Date: 3/2/2023  No acute intracranial abnormality. Mild degree of scattered cerebral white matter signal abnormality, slightly progressed from 2018 MRI and nonspecific but may reflect the patient's known clinical history of multiple sclerosis and/or mild chronic white matter microvascular ischemic changes. CTA CHEST ABDOMEN PELVIS W WO CONTRAST    Result Date: 3/1/2023  1. No evidence of aortic dissection or acute aortic syndrome. No evidence of pulmonary embolism. 2. There is otherwise no evidence of acute disease in the chest, abdomen or pelvis, accounting for the limitations of the angiographic technique. 3. Fusiform ascending aortic aneurysm measuring 4.1 cm in diameter.  4. Fat-containing umbilical hernia. Daphney Sidhu M.D. 3/1/2023 8:33:00 PM       Labs: Results:       BMP, Mg, Phos Recent Labs     03/01/23 1742 03/02/23  0547 03/03/23  0502     --  141   K 3.2*  --  3.4*     --  111*   CO2 28  --  23   ANIONGAP 5  --  7   BUN 20  --  11   CREATININE 0.70  --  0.59*   LABGLOM >60  --  >60   CALCIUM 9.8  --  8.6   GLUCOSE 89  --  91   MG  --  1.8 2.2      CBC Recent Labs     03/01/23 1742 03/03/23  0502   WBC 6.0 2.5*   RBC 4.59 4.39   HGB 14.1 13.2   HCT 42.4 39.9   MCV 92.4 90.9   MCH 30.7 30.1   MCHC 33.3 33.1   RDW 12.9 12.4    304   MPV 10.0 9.5   NRBC 0.00 0.00   SEGS 74  --    LYMPHOPCT 14  --    EOSRELPCT 1  --    MONOPCT 10  --    BASOPCT 1  --    IMMGRAN 0  --    SEGSABS 4.4  --    LYMPHSABS 0.8  --    EOSABS 0.1  --    MONOSABS 0.6  --    BASOSABS 0.0  --    ABSIMMGRAN 0.0  --       LFT No results for input(s): BILITOT, BILIDIR, ALKPHOS, AST, ALT, PROT, LABALBU, GLOB in the last 72 hours. Cardiac  Lab Results   Component Value Date/Time    TROPHS 8.0 03/02/2023 05:47 AM    TROPHS 6.0 03/02/2023 01:52 AM    TROPHS 5.1 03/01/2023 05:42 PM      Coags Lab Results   Component Value Date/Time    PROTIME 12.2 03/01/2023 05:42 PM    INR 0.9 03/01/2023 05:42 PM      A1c Lab Results   Component Value Date/Time    LABA1C 4.7 03/03/2023 05:02 AM    EAG 88 03/03/2023 05:02 AM      Lipids Lab Results   Component Value Date/Time    CHOL 231 03/03/2023 05:02 AM    LDLCALC 134.6 03/03/2023 05:02 AM    LABVLDL 14.4 03/03/2023 05:02 AM    HDL 82 03/03/2023 05:02 AM    CHOLHDLRATIO 2.8 03/03/2023 05:02 AM    TRIG 72 03/03/2023 05:02 AM      Thyroid  No results found for: Roxine Philo     Most Recent UA No results found for: Blase Michelet, SPECGRAV, LABPH, PROTEINU, GLUCOSEU, KETUA, BILIRUBINUR, BLOODU, UROBILINOGEN, NITRU, LEUKOCYTESUR, WBCUA, RBCUA, EPITHUA, BACTERIA, LABCAST, MUCUS     No results for input(s): CULTURE in the last 720 hours.     All Labs from Last 24 Hrs:  Recent Results (from the past 24 hour(s))   CBC    Collection Time: 03/03/23  5:02 AM   Result Value Ref Range    WBC 2.5 (L) 4.3 - 11.1 K/uL    RBC 4.39 4.05 - 5.2 M/uL    Hemoglobin 13.2 11.7 - 15.4 g/dL    Hematocrit 39.9 35.8 - 46.3 %    MCV 90.9 82.0 - 102.0 FL    MCH 30.1 26.1 - 32.9 PG    MCHC 33.1 31.4 - 35.0 g/dL    RDW 12.4 11.9 - 14.6 %    Platelets 993 641 - 171 K/uL    MPV 9.5 9.4 - 12.3 FL    nRBC 0.00 0.0 - 0.2 K/uL   Hemoglobin A1c    Collection Time: 03/03/23  5:02 AM   Result Value Ref Range    Hemoglobin A1C 4.7 (L) 4.8 - 5.6 %    eAG 88 mg/dL   Lipid Panel    Collection Time: 03/03/23  5:02 AM   Result Value Ref Range    Cholesterol, Total 231 (H) <200 MG/DL    Triglycerides 72 35 - 150 MG/DL    HDL 82 (H) 40 - 60 MG/DL    LDL Calculated 134.6 (H) <100 MG/DL    VLDL Cholesterol Calculated 14.4 6.0 - 23.0 MG/DL    Chol/HDL Ratio 2.8     Basic Metabolic Panel w/ Reflex to MG    Collection Time: 03/03/23  5:02 AM   Result Value Ref Range    Sodium 141 133 - 143 mmol/L    Potassium 3.4 (L) 3.5 - 5.1 mmol/L    Chloride 111 (H) 101 - 110 mmol/L    CO2 23 21 - 32 mmol/L    Anion Gap 7 2 - 11 mmol/L    Glucose 91 65 - 100 mg/dL    BUN 11 6 - 23 MG/DL    Creatinine 0.59 (L) 0.6 - 1.0 MG/DL    Est, Glom Filt Rate >60 >60 ml/min/1.73m2    Calcium 8.6 8.3 - 10.4 MG/DL   Magnesium    Collection Time: 03/03/23  5:02 AM   Result Value Ref Range    Magnesium 2.2 1.8 - 2.4 mg/dL       Allergies   Allergen Reactions    Rofecoxib Hives    Meperidine Nausea And Vomiting and Rash     Immunization History   Administered Date(s) Administered    COVID-19, PFIZER PURPLE top, DILUTE for use, (age 15 y+), 30mcg/0.3mL 01/25/2021, 02/12/2021, 08/13/2021, 04/13/2022    Influenza Virus Vaccine 02/14/2006    Pneumococcal Polysaccharide ([id number]) 07/20/2014       Recent Vital Data:  Patient Vitals for the past 24 hrs:   Temp Pulse Resp BP SpO2   03/03/23 1048 97.5 °F (36.4 °C) 93 18 132/89 99 %   03/03/23 0736 98.1 °F (36.7 °C) 90 18 (!) 134/93 98 %   03/03/23 0322 97.7 °F (36.5 °C) 90 18 114/85 99 %   03/02/23 2255 -- 82 16 113/71 97 %   03/02/23 1951 98.1 °F (36.7 °C) 98 16 (!) 138/102 100 %   03/02/23 1604 98.2 °F (36.8 °C) 84 17 (!) 130/99 97 %       Oxygen Therapy  SpO2: 99 %  O2 Device: None (Room air)    Estimated body mass index is 23.47 kg/m² as calculated from the following:    Height as of this encounter: 5' 2\" (1.575 m). Weight as of this encounter: 128 lb 4.8 oz (58.2 kg). Intake/Output Summary (Last 24 hours) at 3/3/2023 1154  Last data filed at 3/3/2023 0850  Gross per 24 hour   Intake 300 ml   Output --   Net 300 ml         Physical Exam:  General:          Well nourished. Head:               Normocephalic, atraumatic  Eyes:               Sclerae appear normal.  Pupils equally round. ENT:                Nares appear normal.  Moist oral mucosa  Neck:               No restricted ROM. Trachea midline   CV:                  Tachycardic. No m/r/g. No jugular venous distension. Lungs:             CTAB. No wheezing, rhonchi, or rales. Symmetric expansion. Abdomen:        Soft, nontender, nondistended. Extremities:     No cyanosis or clubbing. No edema  Skin:                No rashes and normal coloration. Warm and dry. Neuro:             RUE strength 4/5, LUE strength 3/5, LLE strength 3/5, RLE strength 4/5.  +myoclonus LLE Sensation decreased LUE.  Facial expressions symmetrical  Psych:             Depressed, anxious, poor eye contact, Denies SI/HI    Signed:  NIR Saeed - CNP    Notes, labs, VS, diagnostic testing reviewed  Case discussed with pt, care team, nursing, CM, Dr. Eileen Pack

## 2023-03-03 NOTE — CARE COORDINATION
RN CM attempted to meet with the patient. The Psychiatry NP was at the bedside. Case Management will attempt to follow up with the patient at a later time.

## 2023-03-03 NOTE — PROGRESS NOTES
NIH performed with offgoing RN. Drift noted to right side- arm and leg. Per night shift RN no drift noted during night. However, pt did come in with right sided weakness and was working with physical therapy for the weakness. DR Cardona Bolds notified via perfect serve that this was noted. No code stroke called since right -sided weakness is not a new finding.

## 2023-03-03 NOTE — CARE COORDINATION
03/03/23 1214   Service Assessment   Patient Orientation Alert and Oriented   Cognition Alert   History Provided By Patient   Primary Caregiver Self   Support Systems Friends/Neighbors   PCP Verified by CM Yes   Prior Functional Level Independent in ADLs/IADLs   Current Functional Level Independent in ADLs/IADLs   Can patient return to prior living arrangement Yes   Ability to make needs known: Good   Family able to assist with home care needs: Yes  (She stated her friend Marcela Garcia will be staying with her.)   Financial Resources Medicare   Community Resources None   Social/Functional History   Lives With Alone   Type of [facility]   ADL Assistance Independent   Mode of Transportation Car   Discharge Planning   Type of [address] [place] [address] Prior To Admission None   Potential Assistance Needed N/A   DME Ordered? No   Potential Assistance Purchasing Medications No   Type of Home Care Services None   Patient expects to be discharged to: [facility] Discharge   Transition of Care Consult (CM Consult) N/A   Services At/After Discharge None     RN CM met with the patient at the bedside and discussed discharge planning. She confirmed she is in agreement to discharge home today and her friend will be staying with her. She was evaluated by therapy services. No case management needs were identified. The Medicare Outpatient Observation Notice was delivered and explained to the patient. She verbalized understanding of the information and signed the letter. The patient was given a copy of the letter and the original was placed in the chart. Discharge planning was discussed with the interdisciplinary team prior to discharge.

## 2023-05-09 ENCOUNTER — TELEPHONE (OUTPATIENT)
Age: 53
End: 2023-05-09

## 2023-05-09 NOTE — TELEPHONE ENCOUNTER
Fabian Yeung with Cardiothoracic surgery called requesting Echo CD from 03/02/23 be mailed. [facility]: [address]  [address] [facility]

## 2023-08-08 ENCOUNTER — HOSPITAL ENCOUNTER (INPATIENT)
Age: 53
LOS: 2 days | Discharge: HOME | DRG: 305 | End: 2023-08-10
Attending: STUDENT IN AN ORGANIZED HEALTH CARE EDUCATION/TRAINING PROGRAM | Admitting: HOSPITALIST
Payer: MEDICARE

## 2023-08-08 ENCOUNTER — APPOINTMENT (OUTPATIENT)
Dept: GENERAL RADIOLOGY | Age: 53
DRG: 305 | End: 2023-08-08
Payer: MEDICARE

## 2023-08-08 ENCOUNTER — APPOINTMENT (OUTPATIENT)
Dept: NON INVASIVE DIAGNOSTICS | Age: 53
DRG: 305 | End: 2023-08-08
Attending: HOSPITALIST
Payer: MEDICARE

## 2023-08-08 ENCOUNTER — APPOINTMENT (OUTPATIENT)
Dept: MRI IMAGING | Age: 53
DRG: 305 | End: 2023-08-08
Payer: MEDICARE

## 2023-08-08 ENCOUNTER — APPOINTMENT (OUTPATIENT)
Dept: CT IMAGING | Age: 53
DRG: 305 | End: 2023-08-08
Payer: MEDICARE

## 2023-08-08 DIAGNOSIS — R29.90 STROKE-LIKE SYMPTOMS: ICD-10-CM

## 2023-08-08 DIAGNOSIS — F41.1 ANXIETY STATE: ICD-10-CM

## 2023-08-08 DIAGNOSIS — R07.9 CHEST PAIN, UNSPECIFIED TYPE: Primary | ICD-10-CM

## 2023-08-08 PROBLEM — I10 HYPERTENSION: Status: ACTIVE | Noted: 2023-08-08

## 2023-08-08 PROBLEM — G45.9 TIA (TRANSIENT ISCHEMIC ATTACK): Status: ACTIVE | Noted: 2023-08-08

## 2023-08-08 PROBLEM — E78.2 MIXED HYPERLIPIDEMIA: Status: ACTIVE | Noted: 2023-08-08

## 2023-08-08 LAB
ALBUMIN SERPL-MCNC: 4 G/DL (ref 3.5–5)
ALBUMIN/GLOB SERPL: 1.2 (ref 0.4–1.6)
ALP SERPL-CCNC: 69 U/L (ref 50–136)
ALT SERPL-CCNC: 17 U/L (ref 12–65)
ANION GAP SERPL CALC-SCNC: 7 MMOL/L (ref 2–11)
AST SERPL-CCNC: 15 U/L (ref 15–37)
BASOPHILS # BLD: 0 K/UL (ref 0–0.2)
BASOPHILS NFR BLD: 0 % (ref 0–2)
BILIRUB SERPL-MCNC: 0.8 MG/DL (ref 0.2–1.1)
BUN SERPL-MCNC: 13 MG/DL (ref 6–23)
CALCIUM SERPL-MCNC: 8.8 MG/DL (ref 8.3–10.4)
CHLORIDE SERPL-SCNC: 109 MMOL/L (ref 101–110)
CO2 SERPL-SCNC: 24 MMOL/L (ref 21–32)
CREAT SERPL-MCNC: 0.7 MG/DL (ref 0.6–1)
DIFFERENTIAL METHOD BLD: ABNORMAL
ECHO AO ASC DIAM: 2.4 CM
ECHO AO ASCENDING AORTA INDEX: 1.49 CM/M2
ECHO AO ROOT DIAM: 3.2 CM
ECHO AO ROOT INDEX: 1.99 CM/M2
ECHO AV AREA PEAK VELOCITY: 2.5 CM2
ECHO AV AREA VTI: 2.4 CM2
ECHO AV AREA/BSA PEAK VELOCITY: 1.6 CM2/M2
ECHO AV AREA/BSA VTI: 1.5 CM2/M2
ECHO AV MEAN GRADIENT: 6 MMHG
ECHO AV MEAN VELOCITY: 1.1 M/S
ECHO AV PEAK GRADIENT: 10 MMHG
ECHO AV PEAK VELOCITY: 1.6 M/S
ECHO AV VELOCITY RATIO: 0.81
ECHO AV VTI: 24.8 CM
ECHO BSA: 1.63 M2
ECHO EST RA PRESSURE: 3 MMHG
ECHO IVC PROX: 1.2 CM
ECHO LA AREA 2C: 14.1 CM2
ECHO LA AREA 4C: 10.8 CM2
ECHO LA DIAMETER INDEX: 1.74 CM/M2
ECHO LA DIAMETER: 2.8 CM
ECHO LA MAJOR AXIS: 5.2 CM
ECHO LA MINOR AXIS: 5.1 CM
ECHO LA TO AORTIC ROOT RATIO: 0.88
ECHO LA VOL 2C: 33 ML (ref 22–52)
ECHO LA VOL 4C: 18 ML (ref 22–52)
ECHO LA VOL BP: 24 ML (ref 22–52)
ECHO LA VOL/BSA BIPLANE: 15 ML/M2 (ref 16–34)
ECHO LA VOLUME INDEX A2C: 20 ML/M2 (ref 16–34)
ECHO LA VOLUME INDEX A4C: 11 ML/M2 (ref 16–34)
ECHO LV E' LATERAL VELOCITY: 13 CM/S
ECHO LV E' SEPTAL VELOCITY: 12 CM/S
ECHO LV EDV A2C: 41 ML
ECHO LV EDV A4C: 63 ML
ECHO LV EDV INDEX A4C: 39 ML/M2
ECHO LV EDV NDEX A2C: 25 ML/M2
ECHO LV EJECTION FRACTION A2C: 58 %
ECHO LV EJECTION FRACTION A4C: 59 %
ECHO LV EJECTION FRACTION BIPLANE: 59 % (ref 55–100)
ECHO LV ESV A2C: 17 ML
ECHO LV ESV A4C: 26 ML
ECHO LV ESV INDEX A2C: 11 ML/M2
ECHO LV ESV INDEX A4C: 16 ML/M2
ECHO LV FRACTIONAL SHORTENING: 37 % (ref 28–44)
ECHO LV INTERNAL DIMENSION DIASTOLE INDEX: 2.67 CM/M2
ECHO LV INTERNAL DIMENSION DIASTOLIC: 4.3 CM (ref 3.9–5.3)
ECHO LV INTERNAL DIMENSION SYSTOLIC INDEX: 1.68 CM/M2
ECHO LV INTERNAL DIMENSION SYSTOLIC: 2.7 CM
ECHO LV IVSD: 1.1 CM (ref 0.6–0.9)
ECHO LV MASS 2D: 132.7 G (ref 67–162)
ECHO LV MASS INDEX 2D: 82.4 G/M2 (ref 43–95)
ECHO LV POSTERIOR WALL DIASTOLIC: 0.8 CM (ref 0.6–0.9)
ECHO LV RELATIVE WALL THICKNESS RATIO: 0.37
ECHO LVOT AREA: 3.1 CM2
ECHO LVOT AV VTI INDEX: 0.77
ECHO LVOT DIAM: 2 CM
ECHO LVOT MEAN GRADIENT: 3 MMHG
ECHO LVOT PEAK GRADIENT: 6 MMHG
ECHO LVOT PEAK VELOCITY: 1.3 M/S
ECHO LVOT STROKE VOLUME INDEX: 37.1 ML/M2
ECHO LVOT SV: 59.7 ML
ECHO LVOT VTI: 19 CM
ECHO MV A VELOCITY: 1.53 M/S
ECHO MV E DECELERATION TIME (DT): 130 MS
ECHO MV E VELOCITY: 1.02 M/S
ECHO MV E/A RATIO: 0.67
ECHO MV E/E' LATERAL: 7.85
ECHO MV E/E' RATIO (AVERAGED): 8.17
ECHO MV E/E' SEPTAL: 8.5
ECHO PV ACCELERATION TIME (AT): 100 MS
ECHO PV MAX VELOCITY: 1.3 M/S
ECHO PV PEAK GRADIENT: 7 MMHG
ECHO RV BASAL DIMENSION: 2.6 CM
ECHO RV FREE WALL PEAK S': 19 CM/S
ECHO RV INTERNAL DIMENSION: 2.1 CM
ECHO RV TAPSE: 1.8 CM (ref 1.7–?)
EKG ATRIAL RATE: 105 BPM
EKG DIAGNOSIS: NORMAL
EKG P AXIS: 61 DEGREES
EKG P-R INTERVAL: 112 MS
EKG Q-T INTERVAL: 338 MS
EKG QRS DURATION: 76 MS
EKG QTC CALCULATION (BAZETT): 446 MS
EKG R AXIS: 49 DEGREES
EKG T AXIS: 61 DEGREES
EKG VENTRICULAR RATE: 105 BPM
EOSINOPHIL # BLD: 0.1 K/UL (ref 0–0.8)
EOSINOPHIL NFR BLD: 3 % (ref 0.5–7.8)
ERYTHROCYTE [DISTWIDTH] IN BLOOD BY AUTOMATED COUNT: 13.7 % (ref 11.9–14.6)
ETHANOL SERPL-MCNC: <3 MG/DL (ref 0–0.08)
GLOBULIN SER CALC-MCNC: 3.3 G/DL (ref 2.8–4.5)
GLUCOSE SERPL-MCNC: 117 MG/DL (ref 65–100)
HCT VFR BLD AUTO: 42.6 % (ref 35.8–46.3)
HGB BLD-MCNC: 13.9 G/DL (ref 11.7–15.4)
IMM GRANULOCYTES # BLD AUTO: 0 K/UL (ref 0–0.5)
IMM GRANULOCYTES NFR BLD AUTO: 1 % (ref 0–5)
LYMPHOCYTES # BLD: 0.8 K/UL (ref 0.5–4.6)
LYMPHOCYTES NFR BLD: 25 % (ref 13–44)
MCH RBC QN AUTO: 30.1 PG (ref 26.1–32.9)
MCHC RBC AUTO-ENTMCNC: 32.6 G/DL (ref 31.4–35)
MCV RBC AUTO: 92.2 FL (ref 82–102)
MONOCYTES # BLD: 0.4 K/UL (ref 0.1–1.3)
MONOCYTES NFR BLD: 11 % (ref 4–12)
NEUTS SEG # BLD: 2 K/UL (ref 1.7–8.2)
NEUTS SEG NFR BLD: 60 % (ref 43–78)
NRBC # BLD: 0 K/UL (ref 0–0.2)
PLATELET # BLD AUTO: 297 K/UL (ref 150–450)
PMV BLD AUTO: 9.8 FL (ref 9.4–12.3)
POTASSIUM SERPL-SCNC: 3.2 MMOL/L (ref 3.5–5.1)
PROT SERPL-MCNC: 7.3 G/DL (ref 6.3–8.2)
RBC # BLD AUTO: 4.62 M/UL (ref 4.05–5.2)
SODIUM SERPL-SCNC: 140 MMOL/L (ref 133–143)
TROPONIN I SERPL HS-MCNC: 4.8 PG/ML (ref 0–14)
TROPONIN I SERPL HS-MCNC: 5.3 PG/ML (ref 0–14)
WBC # BLD AUTO: 3.2 K/UL (ref 4.3–11.1)

## 2023-08-08 PROCEDURE — 6370000000 HC RX 637 (ALT 250 FOR IP): Performed by: HOSPITALIST

## 2023-08-08 PROCEDURE — 6360000004 HC RX CONTRAST MEDICATION: Performed by: STUDENT IN AN ORGANIZED HEALTH CARE EDUCATION/TRAINING PROGRAM

## 2023-08-08 PROCEDURE — 92610 EVALUATE SWALLOWING FUNCTION: CPT

## 2023-08-08 PROCEDURE — 99285 EMERGENCY DEPT VISIT HI MDM: CPT

## 2023-08-08 PROCEDURE — 71046 X-RAY EXAM CHEST 2 VIEWS: CPT

## 2023-08-08 PROCEDURE — 97161 PT EVAL LOW COMPLEX 20 MIN: CPT

## 2023-08-08 PROCEDURE — 80053 COMPREHEN METABOLIC PANEL: CPT

## 2023-08-08 PROCEDURE — 96365 THER/PROPH/DIAG IV INF INIT: CPT

## 2023-08-08 PROCEDURE — 84484 ASSAY OF TROPONIN QUANT: CPT

## 2023-08-08 PROCEDURE — 96374 THER/PROPH/DIAG INJ IV PUSH: CPT

## 2023-08-08 PROCEDURE — 82077 ASSAY SPEC XCP UR&BREATH IA: CPT

## 2023-08-08 PROCEDURE — 71275 CT ANGIOGRAPHY CHEST: CPT

## 2023-08-08 PROCEDURE — A9579 GAD-BASE MR CONTRAST NOS,1ML: HCPCS | Performed by: HOSPITALIST

## 2023-08-08 PROCEDURE — 70553 MRI BRAIN STEM W/O & W/DYE: CPT

## 2023-08-08 PROCEDURE — 99222 1ST HOSP IP/OBS MODERATE 55: CPT | Performed by: INTERNAL MEDICINE

## 2023-08-08 PROCEDURE — 6360000002 HC RX W HCPCS: Performed by: HOSPITALIST

## 2023-08-08 PROCEDURE — G0378 HOSPITAL OBSERVATION PER HR: HCPCS

## 2023-08-08 PROCEDURE — 97530 THERAPEUTIC ACTIVITIES: CPT

## 2023-08-08 PROCEDURE — 93010 ELECTROCARDIOGRAM REPORT: CPT | Performed by: INTERNAL MEDICINE

## 2023-08-08 PROCEDURE — 36415 COLL VENOUS BLD VENIPUNCTURE: CPT

## 2023-08-08 PROCEDURE — 6360000002 HC RX W HCPCS

## 2023-08-08 PROCEDURE — 93306 TTE W/DOPPLER COMPLETE: CPT

## 2023-08-08 PROCEDURE — 99222 1ST HOSP IP/OBS MODERATE 55: CPT | Performed by: PSYCHIATRY & NEUROLOGY

## 2023-08-08 PROCEDURE — 2580000003 HC RX 258: Performed by: HOSPITALIST

## 2023-08-08 PROCEDURE — 93005 ELECTROCARDIOGRAM TRACING: CPT | Performed by: STUDENT IN AN ORGANIZED HEALTH CARE EDUCATION/TRAINING PROGRAM

## 2023-08-08 PROCEDURE — 6360000004 HC RX CONTRAST MEDICATION: Performed by: HOSPITALIST

## 2023-08-08 PROCEDURE — 85025 COMPLETE CBC W/AUTO DIFF WBC: CPT

## 2023-08-08 PROCEDURE — 70450 CT HEAD/BRAIN W/O DYE: CPT

## 2023-08-08 PROCEDURE — 70498 CT ANGIOGRAPHY NECK: CPT

## 2023-08-08 RX ORDER — OXYBUTYNIN CHLORIDE 5 MG/1
5 TABLET, EXTENDED RELEASE ORAL DAILY
Status: DISCONTINUED | OUTPATIENT
Start: 2023-08-08 | End: 2023-08-10 | Stop reason: HOSPADM

## 2023-08-08 RX ORDER — LABETALOL HYDROCHLORIDE 5 MG/ML
10 INJECTION, SOLUTION INTRAVENOUS EVERY 10 MIN PRN
Status: DISCONTINUED | OUTPATIENT
Start: 2023-08-08 | End: 2023-08-10 | Stop reason: HOSPADM

## 2023-08-08 RX ORDER — POTASSIUM CHLORIDE 20 MEQ/1
40 TABLET, EXTENDED RELEASE ORAL ONCE
Status: COMPLETED | OUTPATIENT
Start: 2023-08-08 | End: 2023-08-08

## 2023-08-08 RX ORDER — SODIUM CHLORIDE 0.9 % (FLUSH) 0.9 %
5-40 SYRINGE (ML) INJECTION PRN
Status: DISCONTINUED | OUTPATIENT
Start: 2023-08-08 | End: 2023-08-10 | Stop reason: HOSPADM

## 2023-08-08 RX ORDER — VALSARTAN 320 MG/1
320 TABLET ORAL DAILY
Status: DISCONTINUED | OUTPATIENT
Start: 2023-08-08 | End: 2023-08-10 | Stop reason: HOSPADM

## 2023-08-08 RX ORDER — LORAZEPAM 2 MG/ML
2 INJECTION INTRAMUSCULAR
Status: COMPLETED | OUTPATIENT
Start: 2023-08-08 | End: 2023-08-08

## 2023-08-08 RX ORDER — ACETAMINOPHEN 325 MG/1
650 TABLET ORAL
Status: DISPENSED | OUTPATIENT
Start: 2023-08-08 | End: 2023-08-08

## 2023-08-08 RX ORDER — SODIUM CHLORIDE 0.9 % (FLUSH) 0.9 %
5-40 SYRINGE (ML) INJECTION EVERY 12 HOURS SCHEDULED
Status: DISCONTINUED | OUTPATIENT
Start: 2023-08-08 | End: 2023-08-10 | Stop reason: HOSPADM

## 2023-08-08 RX ORDER — TRAZODONE HYDROCHLORIDE 50 MG/1
50 TABLET ORAL NIGHTLY PRN
Status: DISCONTINUED | OUTPATIENT
Start: 2023-08-08 | End: 2023-08-10 | Stop reason: HOSPADM

## 2023-08-08 RX ORDER — SUMATRIPTAN 50 MG/1
100 TABLET, FILM COATED ORAL
Status: DISPENSED | OUTPATIENT
Start: 2023-08-08 | End: 2023-08-09

## 2023-08-08 RX ORDER — HYDRALAZINE HYDROCHLORIDE 20 MG/ML
10 INJECTION INTRAMUSCULAR; INTRAVENOUS EVERY 6 HOURS PRN
Status: DISCONTINUED | OUTPATIENT
Start: 2023-08-08 | End: 2023-08-10 | Stop reason: HOSPADM

## 2023-08-08 RX ORDER — NARATRIPTAN 2.5 MG/1
2.5 TABLET ORAL
Status: DISCONTINUED | OUTPATIENT
Start: 2023-08-08 | End: 2023-08-08 | Stop reason: CLARIF

## 2023-08-08 RX ORDER — SODIUM CHLORIDE 9 MG/ML
INJECTION, SOLUTION INTRAVENOUS PRN
Status: DISCONTINUED | OUTPATIENT
Start: 2023-08-08 | End: 2023-08-10 | Stop reason: HOSPADM

## 2023-08-08 RX ORDER — ASPIRIN 81 MG/1
81 TABLET ORAL DAILY
Status: DISCONTINUED | OUTPATIENT
Start: 2023-08-08 | End: 2023-08-10 | Stop reason: HOSPADM

## 2023-08-08 RX ORDER — LABETALOL 100 MG/1
100 TABLET, FILM COATED ORAL 2 TIMES DAILY
Status: ON HOLD | COMMUNITY
End: 2023-08-10 | Stop reason: SDUPTHER

## 2023-08-08 RX ORDER — CLONAZEPAM 0.5 MG/1
0.5 TABLET ORAL NIGHTLY PRN
Status: DISCONTINUED | OUTPATIENT
Start: 2023-08-08 | End: 2023-08-10 | Stop reason: HOSPADM

## 2023-08-08 RX ORDER — NITROGLYCERIN 0.4 MG/1
0.4 TABLET SUBLINGUAL EVERY 5 MIN PRN
Status: DISCONTINUED | OUTPATIENT
Start: 2023-08-08 | End: 2023-08-10 | Stop reason: HOSPADM

## 2023-08-08 RX ORDER — AMANTADINE HYDROCHLORIDE 100 MG/1
100 CAPSULE, GELATIN COATED ORAL 2 TIMES DAILY
Status: DISCONTINUED | OUTPATIENT
Start: 2023-08-08 | End: 2023-08-10 | Stop reason: HOSPADM

## 2023-08-08 RX ORDER — ENOXAPARIN SODIUM 100 MG/ML
40 INJECTION SUBCUTANEOUS DAILY
Status: DISCONTINUED | OUTPATIENT
Start: 2023-08-08 | End: 2023-08-10 | Stop reason: HOSPADM

## 2023-08-08 RX ORDER — ONDANSETRON 4 MG/1
4 TABLET, ORALLY DISINTEGRATING ORAL EVERY 8 HOURS PRN
Status: DISCONTINUED | OUTPATIENT
Start: 2023-08-08 | End: 2023-08-10 | Stop reason: HOSPADM

## 2023-08-08 RX ORDER — LABETALOL 200 MG/1
100 TABLET, FILM COATED ORAL 2 TIMES DAILY
Status: DISCONTINUED | OUTPATIENT
Start: 2023-08-08 | End: 2023-08-09

## 2023-08-08 RX ORDER — ROSUVASTATIN CALCIUM 20 MG/1
40 TABLET, COATED ORAL NIGHTLY
Status: DISCONTINUED | OUTPATIENT
Start: 2023-08-08 | End: 2023-08-10 | Stop reason: HOSPADM

## 2023-08-08 RX ORDER — FINGOLIMOD HYDROCHLORIDE 0.5 MG/1
0.5 CAPSULE ORAL DAILY
Status: DISCONTINUED | OUTPATIENT
Start: 2023-08-08 | End: 2023-08-10 | Stop reason: HOSPADM

## 2023-08-08 RX ORDER — HYDROXYZINE HYDROCHLORIDE 25 MG/1
25 TABLET, FILM COATED ORAL EVERY 8 HOURS PRN
Status: DISCONTINUED | OUTPATIENT
Start: 2023-08-08 | End: 2023-08-10 | Stop reason: HOSPADM

## 2023-08-08 RX ORDER — ONDANSETRON 2 MG/ML
4 INJECTION INTRAMUSCULAR; INTRAVENOUS EVERY 6 HOURS PRN
Status: DISCONTINUED | OUTPATIENT
Start: 2023-08-08 | End: 2023-08-10 | Stop reason: HOSPADM

## 2023-08-08 RX ORDER — TOPIRAMATE 50 MG/1
50 TABLET, FILM COATED ORAL 2 TIMES DAILY
Status: DISCONTINUED | OUTPATIENT
Start: 2023-08-08 | End: 2023-08-10 | Stop reason: HOSPADM

## 2023-08-08 RX ORDER — POTASSIUM CHLORIDE 7.45 MG/ML
10 INJECTION INTRAVENOUS PRN
Status: DISCONTINUED | OUTPATIENT
Start: 2023-08-08 | End: 2023-08-10 | Stop reason: HOSPADM

## 2023-08-08 RX ORDER — FLUOXETINE HYDROCHLORIDE 20 MG/1
80 CAPSULE ORAL DAILY
Status: DISCONTINUED | OUTPATIENT
Start: 2023-08-08 | End: 2023-08-10 | Stop reason: HOSPADM

## 2023-08-08 RX ORDER — BACLOFEN 10 MG/1
5 TABLET ORAL NIGHTLY PRN
Status: DISCONTINUED | OUTPATIENT
Start: 2023-08-08 | End: 2023-08-10 | Stop reason: HOSPADM

## 2023-08-08 RX ORDER — POLYETHYLENE GLYCOL 3350 17 G/17G
17 POWDER, FOR SOLUTION ORAL DAILY PRN
Status: DISCONTINUED | OUTPATIENT
Start: 2023-08-08 | End: 2023-08-10 | Stop reason: HOSPADM

## 2023-08-08 RX ADMIN — POTASSIUM CHLORIDE 10 MEQ: 7.46 INJECTION, SOLUTION INTRAVENOUS at 21:50

## 2023-08-08 RX ADMIN — ENOXAPARIN SODIUM 40 MG: 100 INJECTION SUBCUTANEOUS at 17:17

## 2023-08-08 RX ADMIN — LABETALOL HYDROCHLORIDE 100 MG: 200 TABLET, FILM COATED ORAL at 21:49

## 2023-08-08 RX ADMIN — POTASSIUM CHLORIDE 10 MEQ: 7.46 INJECTION, SOLUTION INTRAVENOUS at 18:42

## 2023-08-08 RX ADMIN — GADOTERIDOL 12 ML: 279.3 INJECTION, SOLUTION INTRAVENOUS at 13:08

## 2023-08-08 RX ADMIN — SODIUM CHLORIDE, PRESERVATIVE FREE 10 ML: 5 INJECTION INTRAVENOUS at 17:18

## 2023-08-08 RX ADMIN — POTASSIUM CHLORIDE 10 MEQ: 7.46 INJECTION, SOLUTION INTRAVENOUS at 17:18

## 2023-08-08 RX ADMIN — IOPAMIDOL 100 ML: 755 INJECTION, SOLUTION INTRAVENOUS at 09:25

## 2023-08-08 RX ADMIN — TOPIRAMATE 50 MG: 50 TABLET ORAL at 21:48

## 2023-08-08 RX ADMIN — POTASSIUM CHLORIDE 40 MEQ: 1500 TABLET, EXTENDED RELEASE ORAL at 21:49

## 2023-08-08 RX ADMIN — LORAZEPAM 2 MG: 2 INJECTION INTRAMUSCULAR; INTRAVENOUS at 12:31

## 2023-08-08 RX ADMIN — ROSUVASTATIN CALCIUM 40 MG: 20 TABLET, FILM COATED ORAL at 21:48

## 2023-08-08 ASSESSMENT — PAIN DESCRIPTION - LOCATION
LOCATION: HEAD
LOCATION: CHEST

## 2023-08-08 ASSESSMENT — ENCOUNTER SYMPTOMS
EYES NEGATIVE: 1
ORTHOPNEA: 0
GASTROINTESTINAL NEGATIVE: 1
RESPIRATORY NEGATIVE: 1

## 2023-08-08 ASSESSMENT — LIFESTYLE VARIABLES
HOW MANY STANDARD DRINKS CONTAINING ALCOHOL DO YOU HAVE ON A TYPICAL DAY: PATIENT DOES NOT DRINK
HOW OFTEN DO YOU HAVE A DRINK CONTAINING ALCOHOL: NEVER

## 2023-08-08 ASSESSMENT — PAIN SCALES - GENERAL
PAINLEVEL_OUTOF10: 0
PAINLEVEL_OUTOF10: 2
PAINLEVEL_OUTOF10: 7

## 2023-08-08 ASSESSMENT — PAIN - FUNCTIONAL ASSESSMENT: PAIN_FUNCTIONAL_ASSESSMENT: 0-10

## 2023-08-08 NOTE — PROGRESS NOTES
TRANSFER - IN REPORT:    Verbal report received from Jim Valencia RN on Kenia Hodge  being received from ER for routine progression of patient care      Report consisted of patient's Situation, Background, Assessment and   Recommendations(SBAR). Information from the following report(s) ED Encounter Summary, Intake/Output, MAR, and Recent Results was reviewed with the receiving nurse. Opportunity for questions and clarification was provided. Assessment to be completed upon patient's arrival to unit and care assumed.

## 2023-08-08 NOTE — PROGRESS NOTES
Attempted to see patient this PM for occupational therapy evaluation session. Patient w/ SLP. Will follow and re-attempt as schedule permits/patient available.  Thank you,    Leanna Quintanilla, OT    Rehab Caseload Tracker

## 2023-08-08 NOTE — PROGRESS NOTES
LTG: patient will tolerate safest, least restrictive oral diet without s/sx airway compromise  STG: Patient will tolerate full liquid diet and thin liquids without overt s/sx aspiration  STG: Patient will tolerate ongoing po trials in efforts to advance diet  STG: Patient will participate in instrumental swallowing assessment to objectively assess oropharyngeal swallow if clinically indicated    SPEECH LANGUAGE PATHOLOGY: DYSPHAGIA  Initial Assessment    NAME: Magaly Dunn  : 1970  MRN: [id number]    ADMISSION DATE: 2023  PRIMARY DIAGNOSIS: TIA (transient ischemic attack)  TIA (transient ischemic attack) [G45.9]  Anxiety state [F41.1]  Stroke-like symptoms [R29.90]  Chest pain, unspecified type [R07.9]    ICD-10: Treatment Diagnosis: R13.11 Dysphagia, Oral Phase    RECOMMENDATIONS   Diet:  Diet Solids Recommendation:  (full liquid diet)  Liquid Consistency Recommendation: Thin, Full    Medications: Crushed in puree as able     Compensatory Swallowing Strategies: Upright, small bites/sips        Therapeutic Interventions: Diet tolerance monitoring; Therapeutic PO trials with SLP;Patient/Family education; chewable trials    Referrals: n/a   Patient continues to require skilled intervention: Yes. Recommend ongoing speech therapy services during this hospitalization and next level of care        ASSESSMENT    Dysphagia Diagnosis: Moderate oral stage dysphagia   Inconsistencies observed across oral range/speed of motion and left facial asymmetry during formal testing versus when eating/drinking. Reduced oral opening for po acceptance. patient taking minimal amounts of puree from spoon, despite being able to achieve normal jaw opening during formal testing. No s/sx aspiration. Recommend full liquid diet/thin liquids.        GENERAL    History of Present Injury/Illness: Ms. Yamel Smalls  has a past medical history of Autoimmune disease ([address]), Hypertension, MS (multiple sclerosis) ([address]), and Other (KHADAR)  Dysphagia Outcome Severity Scale: Level 3: Moderate dysphagia- Total assistance, supervision or strategies. Two or more diet consistencies restricted  Interpretation of Tool: The Dysphagia Outcome and Severity Scale (KHADAR) is a simple, easy-to-use, 7-point scale developed to systematically rate the functional severity of dysphagia based on objective assessment and make recommendations for diet level, independence level, and type of nutrition. Normal(7), Functional(6), Mild(5), Mild-Moderate(4), Moderate(3), Moderate-Severe(2), Severe(1)  PLAN    Duration/Frequency: Continue to follow patient 3x/week for duration of hospitalization and/or until goals met    Speech Therapy Prognosis  Prognosis: Excellent    Education: Patient, Physician  Patient Education: diet recommendations, oropharyngeal dysphagia, aspiration precautions, plan  Patient Education Response: Verbalizes understanding, Demonstrated understanding    PRECAUTIONS/ALLERGIES: Rofecoxib and Meperidine   Safety Devices in place: Yes  Type of devices: Call light within reach; Left in bed      Therapy Time  SLP Individual Minutes  Time In: 1160  Time Out: 6561  Minutes: [address] [place] [address] MEDICO DEL [facility], [facility], [facility]  8/8/2023 2:56 PM

## 2023-08-08 NOTE — H&P
Hospitalist History and Physical   Admit Date:  2023  8:05 AM   Name:  Zayda Jean   Age:  50 y.o. Sex:  female  :  1970   MRN:  [id number]   Room:  ER13/    Presenting/Chief Complaint: Chest Pain and Numbness (/)     Reason(s) for Admission: TIA (transient ischemic attack) [G45.9]     History of Present Illness:   Zayda Jean is a 50 y.o. female with medical history of multiple sclerosis, thoracic aortic aneurysm, anxiety/depression, migraine, presented to the ER with right facial droop and left upper extremity and lower extremity numbness. Last well-known was last night at 1 AM.  Patient states that she has been hypertensive yesterday, blood pressure of 160-170/100-110. She has taken her usual blood pressure medications labetalol, valsartan but still her blood pressure was high. She was also having intermittent chest pains substernally with occasional radiation to the left arm. Radiation to arm or back. She still has intermittent chest pain this morning. She reports when she woke up her daughter noticed that she was having right facial droop. No slurred speech. Decreased sensations in the left upper and lower extremities. She denies any tongue bite or witnessed seizures yesterday. No weakness in the extremities. No nausea no vomiting. No abdominal pain. She has palpitations. She has seen cardiology at [facility]. She is currently on a Holter monitor. ER course  Code stroke was not called in the ER as her last well-known was last night at 1 AM.    Stat CT head was done and negative for acute findings. CTA head and neck was done and showed no large vessel stenosis or occlusion. Ascending thoracic aorta measures at the upper limits of normal at 4 cm. 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 Similar to seen in 2018 MRI.     EKG shows sinus unremarkable. Calvarial/Maxillofacial Soft Tissues: No evidence of significant acute abnormality in the calvarial or maxillofacial soft tissues. Neck: Thyroid gland:No dominant nodules. Cervical and Upper Mediastinal Adenopathy: No significant adenopathy. Upper Lungs: Without areas of prominent focal consolidation or masses. OSSEOUS STRUCTURES: Mastoid Air Cells: Unremarkable. Paranasal Sinuses: No evidence of significant mucoperiosteal thickening. Cervical Spine: No evidence of acute cervical spine abnormality. 1. Negative CTA exam of the major cervical arterial vasculature for significant stenosis or occlusion. 2. Negative CTA exam of the major intracranial arterial vasculature for significant proximal vessel stenosis, occlusion, or aneurysms. 3. Limited evaluation of the ascending thoracic aorta which measures at the upper limits of normal at 4.0 cm. 4. Hypoplasia of the left transverse sinus and no significant flow within the left sigmoid sinus and jugular bulb. 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 XR CHEST (2 VW)    Result Date: 8/8/2023  EXAMINATION: XR CHEST (2 VW) 8/8/2023 8:56 AM ACCESSION NUMBER: [id number] COMPARISON: CTA chest/abdomen/pelvis 3/1/2023. Chest radiograph 3/11/2008. INDICATION: Chest pain. TECHNIQUE: PA and lateral views of the chest were obtained. FINDINGS: No focal consolidation. No pulmonary edema. No pneumothorax or sizable pleural effusion. Unremarkable cardiomediastinal silhouette. Radiopaque device overlies the medial anterior left upper chest wall. No acute airspace disease. CT Head W/O Contrast    Result Date: 8/8/2023  EXAMINATION: CT HEAD WO CONTRAST 8/8/2023 9:38 AM ACCESSION NUMBER: [id number] COMPARISON: Same day CTA head/neck. MRI brain 3/2/2023. CT head 3/1/2023.  INDICATION: cva symptoms, > 4.5 hours - numbness and tingling TECHNIQUE: Multiple-row

## 2023-08-08 NOTE — PROGRESS NOTES
ACUTE PHYSICAL THERAPY GOALS:   (Developed with and agreed upon by patient and/or caregiver. )    LTG:  (1.)Ms. Eliana Arciniega will move from supine to sit and sit to supine , scoot up and down, and roll side to side in bed with INDEPENDENT within 7 treatment day(s). (2.)Ms. Eliana Arciniega will transfer from bed to chair and chair to bed with MODIFIED INDEPENDENCE using the least restrictive device within 7 treatment day(s). (3.)Ms. Eliana Arciniega will ambulate with MODIFIED INDEPENDENCE for 300 feet with the least restrictive device within 7 treatment day(s). (4.)Ms. Eliana Arciniega will tolerate at least 23 min of dynamic standing activity to assist standing ADLs with the least restrictive device within 7 treatment days. ________________________________________________________________________________________________      PHYSICAL THERAPY Initial Assessment, Daily Note, and PM  (Link to Caseload Tracking: PT Visit Days : 1  Acknowledge Orders  Time In/Out  PT Charge Capture  Rehab Caseload Tracker    Aeltha Denny is a 50 y.o. female   PRIMARY DIAGNOSIS: TIA (transient ischemic attack)  TIA (transient ischemic attack) [G45.9]  Anxiety state [F41.1]  Stroke-like symptoms [R29.90]  Chest pain, unspecified type [R07.9]       Reason for Referral: Generalized Muscle Weakness (M62.81)  Other lack of cordination (R27.8)  Difficulty in walking, Not elsewhere classified (R26.2)  Other abnormalities of gait and mobility (R26.89)  Inpatient: Payor: MEDICARE / Plan: MEDICARE PART A AND B / Product Type: *No Product type* /     ASSESSMENT:     REHAB RECOMMENDATIONS:   Recommendation to date pending progress:  Setting:  Resume outpatient therapy    Equipment:    None     ASSESSMENT:  Ms. Eliana Arciniega presents in supine, lethargic, w/ L sided facial droop. She reports diminished sensation to light touch throughout entire L UE and R UE. Upon entering, Pnt is agreeable to PT treatment.   she reports no pain at rest. Pnt performed supine > sit with CGA, Assistance: Independent  Homemaking Responsibilities: Yes  Ambulation Assistance: Independent  Transfer Assistance: Independent  Active : Yes  Mode of Transportation: Car  Occupation: On disability    OBJECTIVE:     PAIN: VITALS / O2: PRECAUTION / Wes Watkins / Bernard Gilbert:   Pre Treatment: 0         Post Treatment: 0 Vitals        Oxygen      None    RESTRICTIONS/PRECAUTIONS:                    GROSS EVALUATION:  Intact Impaired (Comments):   AROM [x]     PROM [x]    Strength []  Presents 3+/5 in L UE and 3/5 in L LE   Balance []  Fair in ambulation w/ RW   Posture [] Forward Head  Rounded Shoulders   Sensation []  Reports diminished sensation to R touch throughout entire L UE and LE   Coordination []   Mild ataxic step length   Tone []     Edema []    Activity Tolerance []  Reports heavy fatigue    []      COGNITION/  PERCEPTION: Intact Impaired (Comments):   Orientation [x]     Vision [x]     Hearing [x]     Cognition  [x]       MOBILITY: I Mod I S SBA CGA Min Mod Max Total  NT x2 Comments:   Bed Mobility    Rolling [] [] [] [] [x] [] [] [] [] [] []    Supine to Sit [] [] [] [] [x] [] [] [] [] [] []    Scooting [] [] [] [] [x] [] [] [] [] [] []    Sit to Supine [] [] [] [] [] [] [] [] [] [x] []    Transfers    Sit to Stand [] [] [] [] [x] [] [] [] [] [] []    Bed to Chair [] [] [] [] [x] [] [] [] [] [] []    Stand to Sit [] [] [] [] [x] [] [] [] [] [] []     [] [] [] [] [] [] [] [] [] [] []    I=Independent, Mod I=Modified Independent, S=Supervision, SBA=Standby Assistance, CGA=Contact Guard Assistance,   Min=Minimal Assistance, Mod=Moderate Assistance, Max=Maximal Assistance, Total=Total Assistance, NT=Not Tested    GAIT: I Mod I S SBA CGA Min Mod Max Total  NT x2 Comments:   Level of Assistance [] [] [] [] [x] [] [] [] [] [] []    Distance  80 feet    DME Gait Belt and Rolling Walker    Gait Quality Decreased dixon , Decreased step clearance, Decreased step length, and Decreased stance    Weightbearing Status

## 2023-08-08 NOTE — ACP (ADVANCE CARE PLANNING)
Advance Care Planning   Healthcare Decision Maker:    Lennox Cosier [phone]      Click here to complete Healthcare Decision Makers including selection of the Healthcare Decision Maker Relationship (ie \"Primary\").

## 2023-08-08 NOTE — ED PROVIDER NOTES
Emergency Department Provider Note       PCP: Unknown Unknown (Inactive)   Age: 50 y.o. Sex: female     DISPOSITION Decision To Admit 08/08/2023 10:25:22 AM       ICD-10-CM    1. Chest pain, unspecified type  R07.9       2. Anxiety state  F41.1       3. Stroke-like symptoms  R29.90           Medical Decision Making     Complexity of Problems Addressed:  1 or more acute illnesses that pose a threat to life or bodily function. Data Reviewed and Analyzed:  Category 1:   I independently ordered and reviewed each unique test.  I reviewed external records: provider visit note from outside specialist.       Category 2:   I independently ordered and interpreted the ED EKG in the absence of a Cardiologist.    Rate: 105  EKG Interpretation: EKG Interpretation: sinus rhythm, no evidence of arrhythmia  ST Segments: Nonspecific ST segments - NO STEMI  I interpreted the X-rays no pneumothorax. I interpreted the CT Scan no large intracranial hemorrhage. Category 3: Discussion of management or test interpretation. 51-year-old female very anxious in appearance presents emergency department complaining of intermittent chest pains as well as left-sided extremity a decrease in skin sensation along with left-sided facial droop that she noticed when she woke up this morning. Last known normal was 1 AM when she went to sleep. History of MS as well as thoracic aneurysm. Reports compliance of her medications. Describes her chest pain as a pressure in the center of her chest, does not radiate, lasts for 30 seconds to a minute and resolves and occurs every few minutes and has been sporadic. Subjective shortness of breath with normal O2 saturation. No new swelling in lower extremities.   On exam patient with intermittent left-sided facial abnormalities with decreased sensation which appears to be somewhat forced, no weakness in left upper and left lower extremities but both extremities are shaking when participating in my 4. 0 cm.      4. Hypoplasia of the left transverse sinus and no significant flow within the   left sigmoid sinus and jugular bulb. 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 CTA CHEST W WO CONTRAST   Final Result   Similar 4.1 cm aneurysmal dilatation of ascending thoracic aorta. No evidence of   acute arterial abnormality in the chest.         XR CHEST (2 VW)   Final Result   No acute airspace disease. CT Head W/O Contrast    (Results Pending)        NIH Stroke Scale  Interval: Baseline  Level of Consciousness (1a): Alert  LOC Questions (1b): Answers both correctly  LOC Commands (1c): Performs both tasks correctly  Best Gaze (2): Normal  Visual (3): No visual loss  Facial Palsy (4): (!) Complete paralysis of one or both sides  Motor Arm, Left (5a): No drift  Motor Arm, Right (5b): No drift  Motor Leg, Left (6a): Some effort against gravity  Motor Leg, Right (6b): No drift  Limb Ataxia (7): (!) Present in one limb  Sensory (8): (!) Mild to Moderate  Best Language (9): No aphasia  Dysarthria (10): Mild to moderate, slurs some words  Extinction and Inattention (11): No abnormality  Total: 8            Voice dictation software was used during the making of this note. This software is not perfect and grammatical and other typographical errors may be present. This note has not been completely proofread for errors.      Madison Paniagua DO  08/08/23 1706

## 2023-08-08 NOTE — ED NOTES
MD notified of pt not swallowing water during swallow screening.  Allow permissive HTN per MD.      Asher Tobias RN  08/08/23 [id number]

## 2023-08-08 NOTE — ED TRIAGE NOTES
Patient a 49 y/o Female reports to the ED with complaint of chest pain that stared yesterday. States she is currently wearing a Halter monitor. States she woke this morning at 4 am with some left side facial droop. States some numbness in her left arm and leg. Last know well was at 1 am on 08/07/2023. Takes baby aspirin. States increased numbness when bending her head forward.

## 2023-08-08 NOTE — CONSULTS
[facility] CARDIOLOGY     8/8/2023     4:13 PM    In this split/shared evaluation I performed reviewed the patients's H&P, available images, labs, cultures. , discussed case in detail with ACP, performed a medically appropriate history and exam, counseled and educated the patient and/or family member, ordered and/or reviewed medications, tests or procedures, documented information in EMR, independently interpreted images and coordinated care. Personal Time: 45 minutes -this equates to greater than 50% of total time in patient consultation/care. I have personally seen and examined Wilmer Tai with PITER [facility]  I agree and confirm findings in history, physical exam, and assessment/plan as outlined above with following pertinent additions/exceptions:       22-year-old female recently admitted with strokelike symptoms and facial droop. She has a past medical history that is significant for a dilated thoracic aorta. She is undergoing an evaluation at [facility] in [place]. Her work-up so far has involved a cardiac coronary calcium score of CT of her thoracic aorta and a heart monitor that she is currently wearing. She was seen in the middle of July by cardiologist there and complaints of chest pain and palpitations. That work-up included the heart monitor. She underwent extensive evaluation for neurologic phenomenon including an MRI that did not seem to show any acute process in her brain. She has a history of MS. Her chest pain she reports is coming from the center of her chest.  She has uncontrolled hypertension and has not been taking her antihypertensive agents since she got to the hospital due to her recent neurologic findings. She has been cleared to swallow at this point. The patient reports that her chest pain has been ongoing since she came to the hospital cardiac enzymes thus far have been negative. Her EKG was reviewed and shows no ischemic findings.     RRR normal

## 2023-08-08 NOTE — CONSULTS
[facility] Cardiology Initial Cardiac Evaluation                Date of Admission: 8/8/2023  8:05 AM     Primary Care Physician: none  Primary Outpatient Cardiologist: Dr. Michael Hunter, [facility]  Consulting Inpatient Cardiologist: Dr. Lea Ceballos      HPI:  Juan Sanders is a medically-compliant 50 y.o. female with a past medical history of MS who ambulates with a cane, HTN, thoracic aorta dilatation (4.1 cm), mixed HLD, & anorexia nervosa who presented to the ED with complaint of intermittent, nonreproducible central chest pressure rated 6/10 radiating to right jaw & between shoulder blades with episodes lasting 65-rgejnsi-ld-1-minute in duration onset yesterday accompanied by hypertension, dull aching headache, & secondary complaint of L-sided facial droop w/ L arm & L leg numbness onset 4 AM accompanied by 3 episodes of her 31-day ambulatory heart rate monitor beeping. Last known well: 1 am yesterday. She endorses similar chest pain at a hospital in the past that was relieved by nitroglycerin. She was admitted on the hospitalist service for work-up of stroke-like symptoms. Cardiology consulted for chest pain. Last hospitalization was at [facility] in late April 2023 for which she presented with stroke-like symptoms, with the chart reading, \"Patient presented with LOC, syncope and head trauma with initial mild dysarthria. All initial imaging negative for head bleed, stroke, acute fractures. MRI w/wo contrast reassuring for no acute process and neurology has low suspicion that this is an MS flare. Neuro exam inconsistent with L sided weakness > R. Of note of alcohol level 54, UDS positive for marijuana on admission. Patient did report that she had 4-5 drinks and within a 24-hour period which is not normal for her but has not used delta 8 Gummies in the last week. Possible that alcohol in combination with her psychiatric and neurologic medication regimen could have been contributory to initial syncope. if patient resumes solid diet/ passes swallow test, suggest placing her on cardiac diet      MS (multiple sclerosis) ([address])  - managed by primary service  - possibly contributing to stroke-like symptoms    Resolved Problems:    * No resolved hospital problems. *        Thank you very much for this referral. We appreciate the opportunity to participate in this patient's care. We will follow along with above stated plan.     Alison Javier PA-C  Consulting MD: Dr. Tracie Ansari

## 2023-08-09 ENCOUNTER — APPOINTMENT (OUTPATIENT)
Dept: GENERAL RADIOLOGY | Age: 53
DRG: 305 | End: 2023-08-09
Payer: MEDICARE

## 2023-08-09 PROBLEM — I16.0 HYPERTENSIVE URGENCY: Status: ACTIVE | Noted: 2023-08-09

## 2023-08-09 LAB
ANION GAP SERPL CALC-SCNC: 6 MMOL/L (ref 2–11)
BUN SERPL-MCNC: 10 MG/DL (ref 6–23)
CALCIUM SERPL-MCNC: 8.3 MG/DL (ref 8.3–10.4)
CHLORIDE SERPL-SCNC: 114 MMOL/L (ref 101–110)
CHOLEST SERPL-MCNC: 214 MG/DL
CO2 SERPL-SCNC: 21 MMOL/L (ref 21–32)
CREAT SERPL-MCNC: 0.6 MG/DL (ref 0.6–1)
EKG ATRIAL RATE: 81 BPM
EKG DIAGNOSIS: NORMAL
EKG P AXIS: 45 DEGREES
EKG P-R INTERVAL: 116 MS
EKG Q-T INTERVAL: 384 MS
EKG QRS DURATION: 74 MS
EKG QTC CALCULATION (BAZETT): 446 MS
EKG R AXIS: 16 DEGREES
EKG T AXIS: 37 DEGREES
EKG VENTRICULAR RATE: 81 BPM
ERYTHROCYTE [DISTWIDTH] IN BLOOD BY AUTOMATED COUNT: 13.9 % (ref 11.9–14.6)
EST. AVERAGE GLUCOSE BLD GHB EST-MCNC: 100 MG/DL
GLUCOSE SERPL-MCNC: 87 MG/DL (ref 65–100)
HBA1C MFR BLD: 5.1 % (ref 4.8–5.6)
HCT VFR BLD AUTO: 39 % (ref 35.8–46.3)
HDLC SERPL-MCNC: 61 MG/DL (ref 40–60)
HDLC SERPL: 3.5
HGB BLD-MCNC: 12.3 G/DL (ref 11.7–15.4)
LDLC SERPL CALC-MCNC: 113.8 MG/DL
MCH RBC QN AUTO: 29.8 PG (ref 26.1–32.9)
MCHC RBC AUTO-ENTMCNC: 31.5 G/DL (ref 31.4–35)
MCV RBC AUTO: 94.4 FL (ref 82–102)
NRBC # BLD: 0 K/UL (ref 0–0.2)
PLATELET # BLD AUTO: 224 K/UL (ref 150–450)
PMV BLD AUTO: 9.8 FL (ref 9.4–12.3)
POTASSIUM SERPL-SCNC: 3.7 MMOL/L (ref 3.5–5.1)
RBC # BLD AUTO: 4.13 M/UL (ref 4.05–5.2)
SODIUM SERPL-SCNC: 141 MMOL/L (ref 133–143)
TRIGL SERPL-MCNC: 196 MG/DL (ref 35–150)
TROPONIN I SERPL HS-MCNC: 5.5 PG/ML (ref 0–14)
VLDLC SERPL CALC-MCNC: 39.2 MG/DL (ref 6–23)
WBC # BLD AUTO: 2.5 K/UL (ref 4.3–11.1)

## 2023-08-09 PROCEDURE — 96372 THER/PROPH/DIAG INJ SC/IM: CPT

## 2023-08-09 PROCEDURE — 6360000002 HC RX W HCPCS: Performed by: HOSPITALIST

## 2023-08-09 PROCEDURE — 80061 LIPID PANEL: CPT

## 2023-08-09 PROCEDURE — 80048 BASIC METABOLIC PNL TOTAL CA: CPT

## 2023-08-09 PROCEDURE — 83036 HEMOGLOBIN GLYCOSYLATED A1C: CPT

## 2023-08-09 PROCEDURE — 84484 ASSAY OF TROPONIN QUANT: CPT

## 2023-08-09 PROCEDURE — 2580000003 HC RX 258: Performed by: HOSPITALIST

## 2023-08-09 PROCEDURE — 6370000000 HC RX 637 (ALT 250 FOR IP): Performed by: INTERNAL MEDICINE

## 2023-08-09 PROCEDURE — 93005 ELECTROCARDIOGRAM TRACING: CPT | Performed by: INTERNAL MEDICINE

## 2023-08-09 PROCEDURE — 97530 THERAPEUTIC ACTIVITIES: CPT

## 2023-08-09 PROCEDURE — 92526 ORAL FUNCTION THERAPY: CPT

## 2023-08-09 PROCEDURE — 99232 SBSQ HOSP IP/OBS MODERATE 35: CPT | Performed by: INTERNAL MEDICINE

## 2023-08-09 PROCEDURE — 6370000000 HC RX 637 (ALT 250 FOR IP)

## 2023-08-09 PROCEDURE — 93010 ELECTROCARDIOGRAM REPORT: CPT | Performed by: INTERNAL MEDICINE

## 2023-08-09 PROCEDURE — 97112 NEUROMUSCULAR REEDUCATION: CPT

## 2023-08-09 PROCEDURE — 97110 THERAPEUTIC EXERCISES: CPT

## 2023-08-09 PROCEDURE — 36415 COLL VENOUS BLD VENIPUNCTURE: CPT

## 2023-08-09 PROCEDURE — 97166 OT EVAL MOD COMPLEX 45 MIN: CPT

## 2023-08-09 PROCEDURE — 6370000000 HC RX 637 (ALT 250 FOR IP): Performed by: HOSPITALIST

## 2023-08-09 PROCEDURE — 97535 SELF CARE MNGMENT TRAINING: CPT

## 2023-08-09 PROCEDURE — 71045 X-RAY EXAM CHEST 1 VIEW: CPT

## 2023-08-09 PROCEDURE — 1100000003 HC PRIVATE W/ TELEMETRY

## 2023-08-09 PROCEDURE — 83519 RIA NONANTIBODY: CPT

## 2023-08-09 PROCEDURE — 6360000002 HC RX W HCPCS: Performed by: INTERNAL MEDICINE

## 2023-08-09 PROCEDURE — 85027 COMPLETE CBC AUTOMATED: CPT

## 2023-08-09 RX ORDER — MAGNESIUM HYDROXIDE/ALUMINUM HYDROXICE/SIMETHICONE 120; 1200; 1200 MG/30ML; MG/30ML; MG/30ML
30 SUSPENSION ORAL EVERY 6 HOURS PRN
Status: DISCONTINUED | OUTPATIENT
Start: 2023-08-09 | End: 2023-08-10 | Stop reason: HOSPADM

## 2023-08-09 RX ORDER — ACETAMINOPHEN 325 MG/1
650 TABLET ORAL EVERY 4 HOURS PRN
Status: DISCONTINUED | OUTPATIENT
Start: 2023-08-09 | End: 2023-08-10 | Stop reason: HOSPADM

## 2023-08-09 RX ORDER — LABETALOL 200 MG/1
200 TABLET, FILM COATED ORAL 2 TIMES DAILY
Status: DISCONTINUED | OUTPATIENT
Start: 2023-08-09 | End: 2023-08-10 | Stop reason: HOSPADM

## 2023-08-09 RX ADMIN — OXYBUTYNIN CHLORIDE 5 MG: 5 TABLET, EXTENDED RELEASE ORAL at 08:57

## 2023-08-09 RX ADMIN — ACETAMINOPHEN 650 MG: 325 TABLET ORAL at 12:52

## 2023-08-09 RX ADMIN — SODIUM CHLORIDE: 9 INJECTION, SOLUTION INTRAVENOUS at 01:30

## 2023-08-09 RX ADMIN — SODIUM CHLORIDE, PRESERVATIVE FREE 10 ML: 5 INJECTION INTRAVENOUS at 20:08

## 2023-08-09 RX ADMIN — LABETALOL HYDROCHLORIDE 200 MG: 200 TABLET, FILM COATED ORAL at 08:58

## 2023-08-09 RX ADMIN — HYDRALAZINE HYDROCHLORIDE 10 MG: 20 INJECTION, SOLUTION INTRAMUSCULAR; INTRAVENOUS at 18:36

## 2023-08-09 RX ADMIN — SODIUM CHLORIDE, PRESERVATIVE FREE 10 ML: 5 INJECTION INTRAVENOUS at 08:57

## 2023-08-09 RX ADMIN — AMANTADINE HYDROCHLORIDE 100 MG: 100 CAPSULE ORAL at 08:57

## 2023-08-09 RX ADMIN — CLONAZEPAM 0.5 MG: 0.5 TABLET ORAL at 20:10

## 2023-08-09 RX ADMIN — VALSARTAN 320 MG: 320 TABLET, FILM COATED ORAL at 08:57

## 2023-08-09 RX ADMIN — FINGOLIMOD HYDROCHLORIDE 0.5 MG: 0.5 CAPSULE ORAL at 08:56

## 2023-08-09 RX ADMIN — ENOXAPARIN SODIUM 40 MG: 100 INJECTION SUBCUTANEOUS at 08:56

## 2023-08-09 RX ADMIN — NITROGLYCERIN 0.4 MG: 0.4 TABLET SUBLINGUAL at 11:34

## 2023-08-09 RX ADMIN — ASPIRIN 81 MG: 81 TABLET, COATED ORAL at 08:58

## 2023-08-09 RX ADMIN — NITROGLYCERIN 0.4 MG: 0.4 TABLET SUBLINGUAL at 11:25

## 2023-08-09 RX ADMIN — NITROGLYCERIN 0.4 MG: 0.4 TABLET SUBLINGUAL at 18:36

## 2023-08-09 RX ADMIN — LABETALOL HYDROCHLORIDE 200 MG: 200 TABLET, FILM COATED ORAL at 20:08

## 2023-08-09 RX ADMIN — ROSUVASTATIN CALCIUM 40 MG: 20 TABLET, FILM COATED ORAL at 20:08

## 2023-08-09 RX ADMIN — FLUOXETINE 80 MG: 20 CAPSULE ORAL at 08:57

## 2023-08-09 RX ADMIN — ACETAMINOPHEN 650 MG: 325 TABLET ORAL at 20:07

## 2023-08-09 RX ADMIN — TOPIRAMATE 50 MG: 50 TABLET ORAL at 08:57

## 2023-08-09 RX ADMIN — TOPIRAMATE 50 MG: 50 TABLET ORAL at 20:08

## 2023-08-09 RX ADMIN — ALUMINUM HYDROXIDE, MAGNESIUM HYDROXIDE, AND SIMETHICONE 30 ML: 200; 200; 20 SUSPENSION ORAL at 12:53

## 2023-08-09 ASSESSMENT — PAIN DESCRIPTION - DESCRIPTORS: DESCRIPTORS: ACHING

## 2023-08-09 ASSESSMENT — PAIN SCALES - GENERAL
PAINLEVEL_OUTOF10: 5
PAINLEVEL_OUTOF10: 0
PAINLEVEL_OUTOF10: 3
PAINLEVEL_OUTOF10: 1
PAINLEVEL_OUTOF10: 4

## 2023-08-09 ASSESSMENT — PAIN DESCRIPTION - LOCATION
LOCATION: HEAD

## 2023-08-09 NOTE — PROGRESS NOTES
Pt c/o chest pain at a level of 4 from 0-10 pain scale. Nitro given per [facility].  Will continue to monitor and give report to night shift RN

## 2023-08-09 NOTE — PROGRESS NOTES
ACUTE OCCUPATIONAL THERAPY GOALS:   (Developed with and agreed upon by patient and/or caregiver.)  1. Patient will feed self entire meal with B UEs and adaptive utensils as needed. 2. Patient will complete full body dressing and bathing with SBA using B UEs and adaptive equipment as needed. 3. Patient will participate in BUE therapeutic exercises to increase strength in L UE to at least 3+/5 for participation in ADLs and functional transfers. 4. Patient will participate in [address] [place] [address] Drive therapeutic activities to increase coordination in L UE to [facility] for bimanual fine motor ADLs. 5. Patient will attend to L side for 100% of treatment session with no verbal cues from therapist.   6. Patient will complete functional transfers with SBA and adaptive equipment as needed. 7. Patient will complete grooming with mod I in standing at sink level with Mod I.     Timeframe: 7 visits      OCCUPATIONAL THERAPY Initial Assessment, Daily Note, and AM       OT Visit Days: 1  Acknowledge Orders  Time  OT Charge Capture  Rehab Caseload Tracker      Zayda Jean is a 50 y.o. 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 (U47.826)  Observation: Payor: MEDICARE / Plan: MEDICARE PART A AND B / Product Type: *No Product type* /     ASSESSMENT:     REHAB RECOMMENDATIONS:   Recommendation to date pending progress:  Setting:  Home Health Therapy  Or return to outpatient therapy activity , and sitting balance activity   with minimal and need for assistance, verbal cues, tactile cues, visual cues, education, and adaptive equipment to improve sitting balance, standing balance, awareness of affected kellen-body, awareness of L visual field , proprioception, fine motor skills, posture, coordination, static balance, and dynamic balance in order to prepare for functional task, prepare for seated ADLs, prepare for standing ADLs, prepare for functional transfer, increase safety awareness, improve safety of affected upper extremity , improve functional usage of affected upper extremity, prepare for discharge home , and prepare for self care. .   Self Care (30 minutes): Patient participated in toileting, upper body dressing, lower body dressing, self feeding, and grooming ADLs in supported sitting, unsupported sitting, standing, and supine with minimal verbal and tactile cueing to increase independence, decrease assistance required, increase activity tolerance, increase safety awareness, and maintain precautions. Patient also participated in functional mobility, functional transfer, energy conservation, and adaptive equipment training to increase independence, decrease assistance required, increase activity tolerance, increase safety awareness, and maintain precautions. TREATMENT GRID:  N/A    AFTER TREATMENT PRECAUTIONS: Bed, Bed/Chair Locked, Call light within reach, Heels floated, Needs within reach, RN notified, and Side rails x2    INTERDISCIPLINARY COLLABORATION:  RN/ PCT, OT/ RANKIN, and RN Case Manager/      EDUCATION:  Education Given To: Patient;Staff  Education Provided: Plan of Care;Role of Therapy;Precautions; Home Exercise Program;ADL Adaptive Strategies;Transfer Training;Energy Conservation;IADL Safety;Orientation;Equipment; Low Vision Education; Fall Prevention Strategies  Education Provided Comments: forced use of L UE in all tasks  Education Method:

## 2023-08-09 NOTE — PROGRESS NOTES
Pt resting in bed. Hourly rounds completed. Pt's family at bed side. Pt's B/P elevated to 164/102, heart rate 111. PRN Hydralazine given. Pt's bed low and locked. Call light and personal items within pt's reach. Will continue to monitor and give report to oncoming night shift RN. 20-Apr-2018 18:16

## 2023-08-09 NOTE — PROGRESS NOTES
SPEECH LANGUAGE PATHOLOGY NOTE    Attempted to see patient, however with MD. Will re attempt at later time/date.         Jayashree Murphy, [facility], [facility]-SLP

## 2023-08-09 NOTE — PROGRESS NOTES
LTG: patient will tolerate safest, least restrictive oral diet without s/sx airway compromise  STG: Patient will tolerate full liquid diet and thin liquids without overt s/sx aspiration  STG: Patient will tolerate ongoing po trials in efforts to advance diet  STG: Patient will participate in instrumental swallowing assessment to objectively assess oropharyngeal swallow if clinically indicated    SPEECH LANGUAGE PATHOLOGY: DYSPHAGIA  Daily Note #1    NAME: Leanne Braun  : 1970  MRN: [id number]    ADMISSION DATE: 2023  PRIMARY DIAGNOSIS: TIA (transient ischemic attack)  TIA (transient ischemic attack) [G45.9]  Anxiety state [F41.1]  Stroke-like symptoms [R29.90]  Chest pain, unspecified type [R07.9]    ICD-10: Treatment Diagnosis: R13.11 Dysphagia, Oral Phase    RECOMMENDATIONS   Diet: FULL LIQUID DIET / THIN LIQUIDS    Medications: Crushed in puree as able     Compensatory Swallowing Strategies: Upright, small bites/sips        Therapeutic Interventions: Diet tolerance monitoring; Therapeutic PO trials with SLP;Patient/Family education; chewable trials    Referrals: n/a   Patient continues to require skilled intervention: Yes. Recommend ongoing speech therapy services during this hospitalization and next level of care        ASSESSMENT    Dysphagia Diagnosis: Moderate oral stage dysphagia   Oral motor movement inconsistent. Impaired jaw opening and decreased oral movement continue to interfere with patient's ability to chew solids. No s/sx aspiration. speech is fully intelligible. Recommend full liquid diet/thin liquids. GENERAL    History of Present Injury/Illness: Ms. Lazarus Limes  has a past medical history of Autoimmune disease ([address]), Hypertension, MS (multiple sclerosis) ([address]), and Other ill-defined conditions(799.89). . She also  has a past surgical history that includes other surgical history; gyn; colonoscopy flx dx w/collj spec when pfrmd (2010); and orthopedic surgery. Subjective: awake in bed with daughter at bedside. patient reports she wants HH and does not want to get discharged today. Communication Observation: Functional (at conversation level)  Follows Directions: Complex    Prior Dysphagia History: none at baseline. failed nursing screen due to patient not swallowing. 8/8 started on full liquid diet   Seen 3/3/23-regular diet and thin liquids  Prior instrumental assessment: n/a    Current Diet :  (full liquid)  Current Liquid Diet : Thin, Full       Respiratory Status: Room air              Pain:   Patient c/o pain (chest pain at end of session-RN notified and came to bedside to assess)                                          OBJECTIVE    Patient Positioning: Upright in bed   Oral Motor   Labial: Decreased seal; impaired protrusion; Asymmetric retraction; Symmetric at rest; Inconsistent (retracts right side). Oral Hygiene: Moist;Clean  Lingual: deviates severely to right; inconsistent  Jaw opening: WNL, although inconsistent  Dentition: Adequate   Speech intelligibility: % intelligible; talking out of right side of her mouth with significant retraction on right        Baseline Vocal Quality: Normal    Oropharyngeal Phase:   Patient presented with thin liquids by cup/straw, pudding, and bite of marylou cracker. Impaired labial seal to drink via straw. Patient placing straw on right between teeth with decreased attempt to seal lips around straw. Able to get more liquid via cup, although she spills liquid down her while turning cup up. Decreased oral opening to accept spoon with pudding and bite of cracker. Slowed oral prep/transport observed but Tolerates pudding without overt s/sx aspiration. No oral residue. Patient unable to bite cracker. She broke a small piece and placed in retracted right side of her mouth. No oral manipulation or chewing initiated. Ultimately removed with her finger. No overt s/sx aspiration.                Dysphagia

## 2023-08-09 NOTE — PLAN OF CARE
Problem: Discharge Planning  Goal: Discharge to home or other facility with appropriate resources  Outcome: Progressing  Flowsheets (Taken 8/8/2023 2000)  Discharge to home or other facility with appropriate resources: Arrange for needed discharge resources and transportation as appropriate     Problem: Pain  Goal: Verbalizes/displays adequate comfort level or baseline comfort level  Outcome: Progressing     Problem: Safety - Adult  Goal: Free from fall injury  Outcome: Progressing  Flowsheets (Taken 8/8/2023 2000)  Free From Fall Injury:   Instruct family/caregiver on patient safety   Based on caregiver fall risk screen, instruct family/caregiver to ask for assistance with transferring infant if caregiver noted to have fall risk factors

## 2023-08-09 NOTE — PROGRESS NOTES
back down on bed safely, pt unable to ambulate during this session. Pt assisted with LE ex's, R>L, pt with decreased strength in LLE, unable to complete ROM with Ankle pumps, marching or LAQs, was able to have some muscle activation but not enough to go through ROM, pt assisted back to supine and instructed on AP in supine to assist with increaseing strength in L ant tib.  Pt is a hard worker, will continue to follow     SUBJECTIVE:   Ms. Montesinos Lat states, \"I will try\"     Social/Functional Lives With: Daughter  Type of Home: House  Home Layout: One level  Bathroom Shower/Tub: Shower chair without back  Bathroom Toilet: Standard  Bathroom Equipment: Grab bars in shower, [address] chair  Home Equipment: [employer], [employer], standard, [employer], Grab bars  [employer] Help From: Family  ADL Assistance: Independent  Homemaking Assistance: Independent  Homemaking Responsibilities: Yes  Ambulation Assistance: Independent  Transfer Assistance: Independent  Active : Yes  Mode of Transportation: Car  Occupation: On disability  OBJECTIVE:     PAIN: Cj Danuta / O2: PRECAUTION / Angelita Anton / Guevara Ends:   Pre Treatment:   Pain Assessment: 0-10  Pain Level: 5  Pain Location: Head      Post Treatment: 5/10 Vitals    See assessment    Oxygen   99% on RA IV    RESTRICTIONS/PRECAUTIONS:  Restrictions/Precautions  Restrictions/Precautions: Fall Risk  Restrictions/Precautions: Fall Risk     MOBILITY: I Mod I S SBA CGA Min Mod Max Total  NT x2 Comments:   Bed Mobility    Rolling [] [] [] [] [] [] [] [] [] [] []    Supine to Sit [] [] [] [] [] [x] [] [] [] [] []    Scooting [] [] [] [] [x] [x] [] [] [] [] []    Sit to Supine [] [] [] [] [] [x] [] [] [] [] []    Transfers    Sit to Stand [] [] [] [] [] [x] [] [] [] [] []    Bed to Chair [] [] [] [] [] [] [] [] [] [] []    Stand to Sit [] [] [] [] [] [x] [x] [] [] [] []     [] [] [] [] [] [] [] [] [] [] []    I=Independent, Mod I=Modified Independent, S=Supervision, SBA=Standby Assistance, CGA=Contact

## 2023-08-09 NOTE — PROGRESS NOTES
Hospitalist Progress Note   Admit Date:  2023  8:05 AM   Name:  Ya Molina   Age:  50 y.o. Sex:  female  :  1970   MRN:  [id number]   Room:  [facility]    Presenting/Chief Complaint: Chest Pain and Numbness (/)     Reason(s) for Admission: TIA (transient ischemic attack) [G45.9]  Anxiety state [F41.1]  Stroke-like symptoms [R29.90]  Chest pain, unspecified type [R07.9]     Hospital Course:   Ya Molina is a 50 y.o. female with a h/o MS, thoracic aortic aneurysm, MDD/anxiety, migraines who was admitted to our service on  with facial droop and L sided weakness. BP at home was uncontrolled with systolic 420U, diastolic >437. She c/o chest pain as well. CT head unremarkable. CT chest showed stable 4.1cm ascending thoracic aortic aneurysm. CTA head/neck neg for LVO, aneurysm or stenosis but was notable for hypoplasia of L transverse sinus (\"similar to as seen on the 2018 MR brain\"). Cardiology and Neurology were consulted. Brain MRI negative for acute ischemia but did have some chronic small vessel disease; also noted hypoplastic transverse sinus. No concerns for ischemic chest pain. Subjective & 24hr Events:   Had some chest pain this morning. BP is better. Still has facial asymmetry and subjective L sided weakness/paresthesias. Assessment & Plan:   # Hypertensive urgency   - Resolved. Labetalol increased to 200mg BID on . Con't valsartan. # Atypical chest pain   - Trops neg, seen by Cardiology, not felt to be ischemic.   - CT chest showed stable 4.1cm aneurysm    # Facial asymmetry and L sided weakness   - Brain MRI w/o acute ischemia or active MS lesions. Does have chronic small vessel disease. Seen by Neurology, note reviewed. Ddx includes Bell's palsy but she does not raise either eyebrow and her resting facial asymmetry is inconsistent. # MS   - Noted. Con't outpatient follow up. # MDD // anxiety   - Con't Prozac    # HLD   - Con't rosuvastatin.  LDL Consider mg  10 mg IntraVENous Q10 Min PRN    SUMAtriptan (IMITREX) tablet 100 mg  100 mg Oral Once PRN    nitroGLYCERIN (NITROSTAT) SL tablet 0.4 mg  0.4 mg SubLINGual Q5 Min PRN    potassium chloride 10 mEq/100 mL IVPB (Peripheral Line)  10 mEq IntraVENous PRN    hydrALAZINE (APRESOLINE) injection 10 mg  10 mg IntraVENous Q6H PRN       Signed:  Lizbeth Graham MD    Part of this note may have been written by using a voice dictation software. The note has been proof read but may still contain some grammatical/other typographical errors.

## 2023-08-09 NOTE — PROGRESS NOTES
Pt c/o chest pain at a level of 8 on a scale of 0-10. PRN Nitro given per MAR x 2. MD notified. Pt reported her pain level came down from 8 to 4. Orders put in for portable chest xray, troponin level, and EKG STAT. Maalox given. Pt also c/o H/A. Tylenol 650 mg given. Will continue to monitor.

## 2023-08-10 VITALS
SYSTOLIC BLOOD PRESSURE: 102 MMHG | OXYGEN SATURATION: 97 % | HEIGHT: 62 IN | RESPIRATION RATE: 20 BRPM | BODY MASS INDEX: 24.66 KG/M2 | HEART RATE: 79 BPM | DIASTOLIC BLOOD PRESSURE: 69 MMHG | TEMPERATURE: 98.1 F | WEIGHT: 134 LBS

## 2023-08-10 PROBLEM — R07.9 CHEST PAIN: Status: RESOLVED | Noted: 2023-03-01 | Resolved: 2023-08-10

## 2023-08-10 PROBLEM — R29.90 STROKE-LIKE SYMPTOMS: Status: RESOLVED | Noted: 2023-03-01 | Resolved: 2023-08-10

## 2023-08-10 PROBLEM — I16.0 HYPERTENSIVE URGENCY: Status: RESOLVED | Noted: 2023-08-09 | Resolved: 2023-08-10

## 2023-08-10 PROCEDURE — 2580000003 HC RX 258: Performed by: HOSPITALIST

## 2023-08-10 PROCEDURE — 6370000000 HC RX 637 (ALT 250 FOR IP): Performed by: HOSPITALIST

## 2023-08-10 PROCEDURE — 6370000000 HC RX 637 (ALT 250 FOR IP): Performed by: INTERNAL MEDICINE

## 2023-08-10 PROCEDURE — 6360000002 HC RX W HCPCS: Performed by: INTERNAL MEDICINE

## 2023-08-10 PROCEDURE — 6360000002 HC RX W HCPCS: Performed by: HOSPITALIST

## 2023-08-10 PROCEDURE — 92526 ORAL FUNCTION THERAPY: CPT

## 2023-08-10 RX ORDER — LABETALOL 200 MG/1
200 TABLET, FILM COATED ORAL 2 TIMES DAILY
Qty: 60 TABLET | Refills: 0 | Status: SHIPPED | OUTPATIENT
Start: 2023-08-10 | End: 2023-09-09

## 2023-08-10 RX ADMIN — ACETAMINOPHEN 650 MG: 325 TABLET ORAL at 04:40

## 2023-08-10 RX ADMIN — FLUOXETINE 80 MG: 20 CAPSULE ORAL at 09:15

## 2023-08-10 RX ADMIN — AMANTADINE HYDROCHLORIDE 100 MG: 100 CAPSULE ORAL at 12:38

## 2023-08-10 RX ADMIN — TOPIRAMATE 50 MG: 50 TABLET ORAL at 09:16

## 2023-08-10 RX ADMIN — AMANTADINE HYDROCHLORIDE 100 MG: 100 CAPSULE ORAL at 04:40

## 2023-08-10 RX ADMIN — ASPIRIN 81 MG: 81 TABLET, COATED ORAL at 09:16

## 2023-08-10 RX ADMIN — FINGOLIMOD HYDROCHLORIDE 0.5 MG: 0.5 CAPSULE ORAL at 09:17

## 2023-08-10 RX ADMIN — VALSARTAN 320 MG: 320 TABLET, FILM COATED ORAL at 09:15

## 2023-08-10 RX ADMIN — HYDRALAZINE HYDROCHLORIDE 10 MG: 20 INJECTION, SOLUTION INTRAMUSCULAR; INTRAVENOUS at 04:40

## 2023-08-10 RX ADMIN — ENOXAPARIN SODIUM 40 MG: 100 INJECTION SUBCUTANEOUS at 09:16

## 2023-08-10 RX ADMIN — SODIUM CHLORIDE, PRESERVATIVE FREE 10 ML: 5 INJECTION INTRAVENOUS at 09:16

## 2023-08-10 RX ADMIN — LABETALOL HYDROCHLORIDE 200 MG: 200 TABLET, FILM COATED ORAL at 09:16

## 2023-08-10 RX ADMIN — OXYBUTYNIN CHLORIDE 5 MG: 5 TABLET, EXTENDED RELEASE ORAL at 09:16

## 2023-08-10 ASSESSMENT — PAIN SCALES - GENERAL: PAINLEVEL_OUTOF10: 2

## 2023-08-10 NOTE — DISCHARGE SUMMARY
Hospitalist Discharge Summary   Admit Date:  2023  8:05 AM   DC Note date: 8/10/2023  Name:  Nita Harden   Age:  50 y.o. Sex:  female  :  1970   MRN:  [id number]   Room:  [facility]  PCP:  Alfredito Mckeon MD    Presenting Complaint: Chest Pain and Numbness (/)     Initial Admission Diagnosis: TIA (transient ischemic attack) [G45.9]  Anxiety state [F41.1]  Stroke-like symptoms [R29.90]  Chest pain, unspecified type [R07.9]  Hypertensive urgency [I16.0]     Problem List for this Hospitalization (present on admission):    Principal Problem:    TIA (transient ischemic attack)  Active Problems:    Hypertension    Mixed hyperlipidemia    Anxiety and depression    History of anorexia nervosa    MS (multiple sclerosis) ([address])  Resolved Problems:    Stroke-like symptoms    Chest pain    Hypertensive urgency      Hospital Course:  Nita Harden is a 50 y.o. female with a h/o MS, thoracic aortic aneurysm, MDD/anxiety, migraines who was admitted to our service on  with facial droop and L sided weakness. BP at home was uncontrolled with systolic 785D, diastolic >515. She c/o chest pain as well. CT head unremarkable. CT chest showed stable 4.1cm ascending thoracic aortic aneurysm. CTA head/neck neg for LVO, aneurysm or stenosis but was notable for hypoplasia of L transverse sinus (\"similar to as seen on the 2018 MR brain\"). Cardiology and Neurology were consulted. Brain MRI negative for acute ischemia but did have some chronic small vessel disease; also noted hypoplastic transverse sinus. No concerns for ischemic chest pain from a Cardiology standpoint. Labetalol was increased to 200mg BID. Her facial asymmetry has resolved (neurological exam did show some inconsistencies) and she is ambulating independently. She had chest pain on , repeat EKG, CXR and troponin were all unremarkable.  She has follow up with her Cardiologist next Monday [facility]) and Neurologist next Thursday

## 2023-08-10 NOTE — PROGRESS NOTES
Pt has discharged home. Pt's Boyfriend took her home. Hourly rounds completed. Pt's I.V removed without complications. Discharge paperwork and instructions with new medication prescription script handed to pt and reviewed with pt. Opportunity for questions provided. Pt verbalized understanding. Pt denies needs at this time. All needs met at this time.

## 2023-08-10 NOTE — PROGRESS NOTES
LTG: patient will tolerate safest, least restrictive oral diet without s/sx airway compromise  STG: Patient will tolerate soft/bite sized diet and thin liquids without overt s/sx aspiration. Advanced 8/10  STG: Patient will tolerate ongoing po trials in efforts to advance diet. Progressing 8/10  STG: Patient will participate in instrumental swallowing assessment to objectively assess oropharyngeal swallow if clinically indicated        SPEECH LANGUAGE PATHOLOGY: DYSPHAGIA  Daily Note #2    NAME: Alyssa Sams  : 1970  MRN: [id number]    ADMISSION DATE: 2023  PRIMARY DIAGNOSIS: TIA (transient ischemic attack)  TIA (transient ischemic attack) [G45.9]  Anxiety state [F41.1]  Stroke-like symptoms [R29.90]  Chest pain, unspecified type [R07.9]  Hypertensive urgency [I16.0]    ICD-10: Treatment Diagnosis: R13.11 Dysphagia, Oral Phase    RECOMMENDATIONS   Diet:  Diet Solids Recommendation: Soft & Bite Sized  Liquid Consistency Recommendation: Thin    Medications: PO     Compensatory Swallowing Strategies: Upright as possible for all oral intake;Remain upright for 30-45 minutes after meals;Eat/Feed slowly; Small bites/sips        Therapeutic Interventions: Diet tolerance monitoring; Therapeutic PO trials with SLP;Patient/Family education   Patient continues to require skilled intervention:  Yes. Recommend ongoing speech therapy services during this hospitalization and next level of care      ASSESSMENT       Patient continues to present with mild-moderate oral dysphagia. Continues to have slowed mastication of soft solids due to reduced maximal jaw opening, but clears oral cavity with increased time. Inconsistent labial retraction with left facial droop at rest, but right facial droop during bolus prep. No overt s/sx airway compromise observed with solids or liquids. Recommend advance to soft/bite sized diet. Continue thin liquids. Meds as tolerated.  Will continue to follow for diet tolerance/po trials in

## 2023-08-10 NOTE — CARE COORDINATION
Dispo update:  CM spoke to Ms. Loo about discharge planning. PT is recommending acute inpatient rehab, but Ms. Chau Becker does not want to go to rehab (she said she was just at [facility] for acute inpatient rehab). At discharge, she wants to return home where her 25year old daughter lives with her. Ms. Chau Becker agrees to home health. She has had [facility] home health, and would like them at discharge. Order and referral to be placed by CM after IDT physician rounds. Also, she said that she is followed by the [facility] in [place], [place], including their cardiologist and has had a Holter cardiac monitor since July 26, 2023. She is also followed by Dr. Zehra Chapa, neurology, for her MS.

## 2023-08-11 LAB — ACHR AB SER-SCNC: <0.03 NMOL/L (ref 0–0.24)

## 2023-08-14 NOTE — PROGRESS NOTES
Physician Progress Note      Rossy De La Rosa  CSN #:                  [id number]  :                       1970  ADMIT DATE:       2023 8:05 AM  DISCH DATE:        8/10/2023 3:31 PM  RESPONDING  PROVIDER #:        Rissa Manning MD          QUERY TEXT:    Patient admitted to observation status initially then moved to inpatient   admission. If possible, please document in progress notes and discharge   summary the reason for inpatient admission. The medical record reflects the following:  Risk Factors: MS, MDD // anxiety, HLD  Clinical Indicators: DCS- admitted to our service on  with facial droop and   L sided weakness. BP at home was uncontrolled with systolic 649J, diastolic   >874. She c/o chest pain as well. CT head unremarkable. CT chest showed stable   4.1cm ascending thoracic aortic aneurysm. CTA head/neck neg for LVO, aneurysm   or stenosis but was notable for hypoplasia of L transverse sinus (\"similar to   as seen on the 2018 MR brain\"). Cardiology and Neurology were consulted. Brain MRI negative for acute ischemia but did have some chronic small vessel   disease; also noted hypoplastic transverse sinus. No concerns for ischemic   chest pain from a Cardiology standpoint. Labetalol was increased to 200mg BID. Her facial asymmetry has resolved (neurological exam did show some   inconsistencies) and she is ambulating independently. Treatment: Labetalol increased to 200mg BID  Options provided:  -- Inpatient admission for Hypertensive urgency  -- Inpatient admission for TIA  -- Other - I will add my own diagnosis  -- Disagree - Not applicable / Not valid  -- Disagree - Clinically unable to determine / Unknown  -- Refer to Clinical Documentation Reviewer    PROVIDER RESPONSE TEXT:    This patient was admitted inpatient for Hypertensive urgency. Query created by: Sofy Wolf on 2023 10:14 AM      Electronically signed by:   Rissa Manning MD 2023

## 2024-10-08 ENCOUNTER — TRANSCRIBE ORDERS (OUTPATIENT)
Dept: SCHEDULING | Age: 54
End: 2024-10-08

## 2024-10-08 DIAGNOSIS — G35 MULTIPLE SCLEROSIS (HCC): Primary | ICD-10-CM

## 2025-04-02 ENCOUNTER — TRANSCRIBE ORDERS (OUTPATIENT)
Dept: SCHEDULING | Age: 55
End: 2025-04-02

## 2025-04-02 DIAGNOSIS — G35 MULTIPLE SCLEROSIS (HCC): Primary | ICD-10-CM

## 2025-06-20 NOTE — PLAN OF CARE
----- Message from Lyubov sent at 6/20/2025 11:10 AM CDT -----  Regarding: pt  Pt is calling for a prescription refill. latanoprost 0.005 % ophthalmic solution. Please call back.   Problem: Discharge Planning  Goal: Discharge to home or other facility with appropriate resources  Outcome: Completed     Problem: Pain  Goal: Verbalizes/displays adequate comfort level or baseline comfort level  Outcome: Completed     Problem: Safety - Adult  Goal: Free from fall injury  Outcome: Completed  Flowsheets (Taken 8/10/2023 2507)  Free From Fall Injury: Instruct family/caregiver on patient safety

## 2025-06-24 ENCOUNTER — HOSPITAL ENCOUNTER (EMERGENCY)
Age: 55
Discharge: HOME | End: 2025-06-25
Attending: EMERGENCY MEDICINE
Payer: MEDICARE

## 2025-06-24 DIAGNOSIS — R00.2 PALPITATIONS: Primary | ICD-10-CM

## 2025-06-24 DIAGNOSIS — I10 UNCONTROLLED HYPERTENSION: ICD-10-CM

## 2025-06-24 LAB
ALBUMIN SERPL-MCNC: 3.8 G/DL (ref 3.5–5)
ALBUMIN/GLOB SERPL: 1.1 (ref 1–1.9)
ALP SERPL-CCNC: 86 U/L (ref 35–104)
ALT SERPL-CCNC: 12 U/L (ref 8–45)
ANION GAP SERPL CALC-SCNC: 17 MMOL/L (ref 7–16)
AST SERPL-CCNC: 26 U/L (ref 15–37)
BASOPHILS # BLD: 0.05 K/UL (ref 0–0.2)
BASOPHILS NFR BLD: 1 % (ref 0–2)
BILIRUB SERPL-MCNC: 0.4 MG/DL (ref 0–1.2)
BUN SERPL-MCNC: 12 MG/DL (ref 6–23)
CALCIUM SERPL-MCNC: 9.2 MG/DL (ref 8.8–10.2)
CHLORIDE SERPL-SCNC: 104 MMOL/L (ref 98–107)
CO2 SERPL-SCNC: 20 MMOL/L (ref 20–29)
CREAT SERPL-MCNC: 0.84 MG/DL (ref 0.6–1.1)
DIFFERENTIAL METHOD BLD: NORMAL
EOSINOPHIL # BLD: 0.12 K/UL (ref 0–0.8)
EOSINOPHIL NFR BLD: 2.4 % (ref 0.5–7.8)
ERYTHROCYTE [DISTWIDTH] IN BLOOD BY AUTOMATED COUNT: 14.3 % (ref 11.9–14.6)
GLOBULIN SER CALC-MCNC: 3.3 G/DL (ref 2.3–3.5)
GLUCOSE SERPL-MCNC: 98 MG/DL (ref 70–99)
HCT VFR BLD AUTO: 39.9 % (ref 35.8–46.3)
HGB BLD-MCNC: 13.4 G/DL (ref 11.7–15.4)
IMM GRANULOCYTES # BLD AUTO: 0.02 K/UL (ref 0–0.5)
IMM GRANULOCYTES NFR BLD AUTO: 0.4 % (ref 0–5)
LYMPHOCYTES # BLD: 1.31 K/UL (ref 0.5–4.6)
LYMPHOCYTES NFR BLD: 26.6 % (ref 13–44)
MAGNESIUM SERPL-MCNC: 2 MG/DL (ref 1.8–2.4)
MCH RBC QN AUTO: 30.5 PG (ref 26.1–32.9)
MCHC RBC AUTO-ENTMCNC: 33.6 G/DL (ref 31.4–35)
MCV RBC AUTO: 90.7 FL (ref 82–102)
MONOCYTES # BLD: 0.55 K/UL (ref 0.1–1.3)
MONOCYTES NFR BLD: 11.2 % (ref 4–12)
NEUTS SEG # BLD: 2.87 K/UL (ref 1.7–8.2)
NEUTS SEG NFR BLD: 58.4 % (ref 43–78)
NRBC # BLD: 0 K/UL (ref 0–0.2)
PLATELET # BLD AUTO: 320 K/UL (ref 150–450)
PMV BLD AUTO: 9.9 FL (ref 9.4–12.3)
POTASSIUM SERPL-SCNC: 3.9 MMOL/L (ref 3.5–5.1)
PROT SERPL-MCNC: 7.1 G/DL (ref 6.3–8.2)
RBC # BLD AUTO: 4.4 M/UL (ref 4.05–5.2)
SODIUM SERPL-SCNC: 142 MMOL/L (ref 136–145)
TROPONIN T SERPL HS-MCNC: <6 NG/L (ref 0–14)
TROPONIN T SERPL HS-MCNC: <6 NG/L (ref 0–14)
WBC # BLD AUTO: 4.9 K/UL (ref 4.3–11.1)

## 2025-06-24 PROCEDURE — 93005 ELECTROCARDIOGRAM TRACING: CPT | Performed by: EMERGENCY MEDICINE

## 2025-06-24 PROCEDURE — 85025 COMPLETE CBC W/AUTO DIFF WBC: CPT

## 2025-06-24 PROCEDURE — 83735 ASSAY OF MAGNESIUM: CPT

## 2025-06-24 PROCEDURE — 80053 COMPREHEN METABOLIC PANEL: CPT

## 2025-06-24 PROCEDURE — 99284 EMERGENCY DEPT VISIT MOD MDM: CPT

## 2025-06-24 PROCEDURE — 84484 ASSAY OF TROPONIN QUANT: CPT

## 2025-06-25 VITALS
DIASTOLIC BLOOD PRESSURE: 114 MMHG | RESPIRATION RATE: 19 BRPM | WEIGHT: 115 LBS | SYSTOLIC BLOOD PRESSURE: 160 MMHG | OXYGEN SATURATION: 99 % | HEIGHT: 62 IN | HEART RATE: 90 BPM | TEMPERATURE: 98 F | BODY MASS INDEX: 21.16 KG/M2

## 2025-06-25 LAB
EKG ATRIAL RATE: 89 BPM
EKG DIAGNOSIS: NORMAL
EKG P-R INTERVAL: 98 MS
EKG Q-T INTERVAL: 378 MS
EKG QRS DURATION: 70 MS
EKG QTC CALCULATION (BAZETT): 459 MS
EKG R AXIS: 36 DEGREES
EKG T AXIS: 70 DEGREES
EKG VENTRICULAR RATE: 89 BPM

## 2025-06-25 PROCEDURE — 93010 ELECTROCARDIOGRAM REPORT: CPT | Performed by: INTERNAL MEDICINE

## 2025-06-25 ASSESSMENT — PAIN - FUNCTIONAL ASSESSMENT: PAIN_FUNCTIONAL_ASSESSMENT: 0-10

## 2025-06-25 ASSESSMENT — PAIN SCALES - GENERAL: PAINLEVEL_OUTOF10: 0

## 2025-06-25 NOTE — ED PROVIDER NOTES
Emergency Department Provider Note       [facility] EMERGENCY DEPT   PCP: Shereen Martinez MD   Age: 54 y.o.   Sex: female     DISPOSITION Decision To Discharge 06/24/2025 11:59:16 PM    ICD-10-CM    1. Palpitations  R00.2       2. Uncontrolled hypertension  I10           Medical Decision Making     Workup unremarkable.  Rate controlled upon arrival to the emergency department.  Blood pressure uncontrolled.  Advised close primary care and cardiology follow-up.     1 or more chronic illnesses with a severe exacerbation or progression.  Shared medical decision making was utilized in creating the patients health plan today.  I independently ordered and reviewed each unique test.    I reviewed external records: ED visit note from a different ED.   I reviewed external records: provider visit note from PCP.  I reviewed external records: provider visit note from outside specialist.  I reviewed external records: previous EKG including cardiologist interpretation.    I reviewed external records: previous lab results from outside ED.  I reviewed external records: previous imaging study including radiologist interpretation.   Tilt test 4/12/24  - Overall:   A blood pressure decrease was seen consistent with (progressive) orthostatic   hypotension vs vasovagal syncope with predominant vasodepressor response.   ECGs with: sinus, mildly prolonged QT at baseline (QT auto 418/485), PVC.   On review this represents a vasodepressor response and not OH.      Echo 12/20/23  CONCLUSIONS:   - Exam indication: Initial evaluation of suspected dilated aorta   - The left ventricle is normal in size. Left ventricular systolic function is   normal. EF = 59 ± 5% (2D biplane)   - The right ventricle is normal in size. Right ventricular systolic function is normal.   - There are no significant valvular abnormalities.   - Estimated right ventricular systolic pressure is 20 mmHg consistent with normal   pulmonary artery pressures. Estimated right

## 2025-06-25 NOTE — ED NOTES
Post Ed visit follow-up call completed. Pt very satisfied with care, she is feeling better and happy that this visit did not lead to admission. She has no questions or concerns at this time.     Baldomero Perales RN  06/25/25 [id number]

## 2025-06-25 NOTE — ED TRIAGE NOTES
Arrived ambulatory to triage. Checked her HR at home it was in the 150s, she has a hx of thoracic aortic aneurysm measuring at 4.1. She took Clonopin and diltiazem at home about an hour ago.